# Patient Record
Sex: MALE | Race: WHITE | NOT HISPANIC OR LATINO | Employment: UNEMPLOYED | URBAN - NONMETROPOLITAN AREA
[De-identification: names, ages, dates, MRNs, and addresses within clinical notes are randomized per-mention and may not be internally consistent; named-entity substitution may affect disease eponyms.]

---

## 2018-03-13 ENCOUNTER — OFFICE VISIT CONVERTED (OUTPATIENT)
Dept: FAMILY MEDICINE CLINIC | Age: 58
End: 2018-03-13
Attending: FAMILY MEDICINE

## 2018-09-20 ENCOUNTER — OFFICE VISIT CONVERTED (OUTPATIENT)
Dept: FAMILY MEDICINE CLINIC | Age: 58
End: 2018-09-20
Attending: FAMILY MEDICINE

## 2018-09-26 ENCOUNTER — OFFICE VISIT CONVERTED (OUTPATIENT)
Dept: FAMILY MEDICINE CLINIC | Age: 58
End: 2018-09-26
Attending: FAMILY MEDICINE

## 2018-10-16 ENCOUNTER — OFFICE VISIT CONVERTED (OUTPATIENT)
Dept: CARDIOLOGY | Facility: CLINIC | Age: 58
End: 2018-10-16
Attending: INTERNAL MEDICINE

## 2018-10-16 ENCOUNTER — CONVERSION ENCOUNTER (OUTPATIENT)
Dept: OTHER | Facility: HOSPITAL | Age: 58
End: 2018-10-16

## 2018-11-12 ENCOUNTER — CONVERSION ENCOUNTER (OUTPATIENT)
Dept: CARDIOLOGY | Facility: CLINIC | Age: 58
End: 2018-11-12
Attending: INTERNAL MEDICINE

## 2018-12-06 ENCOUNTER — OFFICE VISIT CONVERTED (OUTPATIENT)
Dept: FAMILY MEDICINE CLINIC | Age: 58
End: 2018-12-06
Attending: NURSE PRACTITIONER

## 2019-02-05 ENCOUNTER — CONVERSION ENCOUNTER (OUTPATIENT)
Dept: CARDIOLOGY | Facility: CLINIC | Age: 59
End: 2019-02-05

## 2019-02-05 ENCOUNTER — OFFICE VISIT CONVERTED (OUTPATIENT)
Dept: CARDIOLOGY | Facility: CLINIC | Age: 59
End: 2019-02-05
Attending: INTERNAL MEDICINE

## 2019-04-25 ENCOUNTER — HOSPITAL ENCOUNTER (OUTPATIENT)
Dept: OTHER | Facility: HOSPITAL | Age: 59
Discharge: HOME | End: 2019-04-25
Attending: FAMILY MEDICINE

## 2019-04-25 ENCOUNTER — OFFICE VISIT CONVERTED (OUTPATIENT)
Dept: FAMILY MEDICINE CLINIC | Age: 59
End: 2019-04-25
Attending: FAMILY MEDICINE

## 2019-06-19 ENCOUNTER — OFFICE VISIT CONVERTED (OUTPATIENT)
Dept: OTOLARYNGOLOGY | Facility: CLINIC | Age: 59
End: 2019-06-19
Attending: OTOLARYNGOLOGY

## 2019-06-26 ENCOUNTER — HOSPITAL ENCOUNTER (OUTPATIENT)
Dept: PREADMISSION TESTING | Facility: HOSPITAL | Age: 59
Discharge: HOME | End: 2019-06-26
Attending: OTOLARYNGOLOGY

## 2019-06-26 LAB
ANION GAP SERPL CALC-SCNC: 15 MMOL/L (ref 8–19)
BUN SERPL-MCNC: 31 MG/DL (ref 5–25)
BUN/CREAT SERPL: 22 {RATIO} (ref 6–20)
CALCIUM SERPL-MCNC: 9.3 MG/DL (ref 8.7–10.4)
CHLORIDE SERPL-SCNC: 100 MMOL/L (ref 99–111)
CONV CO2: 28 MMOL/L (ref 22–32)
CREAT UR-MCNC: 1.38 MG/DL (ref 0.7–1.2)
GFR SERPLBLD BASED ON 1.73 SQ M-ARVRAT: 55 ML/MIN/{1.73_M2}
GLUCOSE SERPL-MCNC: 77 MG/DL (ref 70–99)
OSMOLALITY SERPL CALC.SUM OF ELEC: 291 MOSM/KG (ref 273–304)
POTASSIUM SERPL-SCNC: 5.1 MMOL/L (ref 3.5–5.3)
SODIUM SERPL-SCNC: 138 MMOL/L (ref 135–147)

## 2019-07-01 ENCOUNTER — HOSPITAL ENCOUNTER (OUTPATIENT)
Dept: PERIOP | Facility: HOSPITAL | Age: 59
Setting detail: HOSPITAL OUTPATIENT SURGERY
Discharge: HOME | End: 2019-07-01
Attending: OTOLARYNGOLOGY

## 2019-07-01 LAB
GLUCOSE BLD-MCNC: 131 MG/DL (ref 70–99)
GLUCOSE BLD-MCNC: 170 MG/DL (ref 70–99)

## 2019-07-03 LAB — BACTERIA SPEC ANAEROBE CULT: NORMAL

## 2019-07-05 LAB
AMOXICILLIN+CLAV SUSC ISLT: <=2
AMOXICILLIN+CLAV SUSC ISLT: >=32
AMPICILLIN SUSC ISLT: >=32
AMPICILLIN SUSC ISLT: >=32
AMPICILLIN+SULBAC SUSC ISLT: 16
AMPICILLIN+SULBAC SUSC ISLT: >=32
BACTERIA SPEC AEROBE CULT: ABNORMAL
CEFAZOLIN SUSC ISLT: >=64
CEFAZOLIN SUSC ISLT: >=64
CEFEPIME SUSC ISLT: <=1
CEFEPIME SUSC ISLT: <=1
CEFTAZIDIME SUSC ISLT: <=1
CEFTAZIDIME SUSC ISLT: <=1
CEFTRIAXONE SUSC ISLT: <=1
CEFTRIAXONE SUSC ISLT: <=1
CEFUROXIME ORAL SUSC ISLT: 2
CEFUROXIME ORAL SUSC ISLT: >=64
CEFUROXIME PARENTER SUSC ISLT: 2
CEFUROXIME PARENTER SUSC ISLT: >=64
CIPROFLOXACIN SUSC ISLT: <=0.25
CIPROFLOXACIN SUSC ISLT: <=0.25
ERTAPENEM SUSC ISLT: <=0.5
ERTAPENEM SUSC ISLT: <=0.5
GENTAMICIN SUSC ISLT: <=1
GENTAMICIN SUSC ISLT: <=1
LEVOFLOXACIN SUSC ISLT: <=0.12
LEVOFLOXACIN SUSC ISLT: <=0.12
TETRACYCLINE SUSC ISLT: <=1
TETRACYCLINE SUSC ISLT: >=16
TMP SMX SUSC ISLT: <=20
TMP SMX SUSC ISLT: <=20
TOBRAMYCIN SUSC ISLT: <=1
TOBRAMYCIN SUSC ISLT: <=1

## 2019-07-10 ENCOUNTER — OFFICE VISIT CONVERTED (OUTPATIENT)
Dept: OTOLARYNGOLOGY | Facility: CLINIC | Age: 59
End: 2019-07-10
Attending: OTOLARYNGOLOGY

## 2019-08-21 ENCOUNTER — OFFICE VISIT CONVERTED (OUTPATIENT)
Dept: OTOLARYNGOLOGY | Facility: CLINIC | Age: 59
End: 2019-08-21
Attending: OTOLARYNGOLOGY

## 2020-02-25 ENCOUNTER — OFFICE VISIT CONVERTED (OUTPATIENT)
Dept: FAMILY MEDICINE CLINIC | Age: 60
End: 2020-02-25
Attending: FAMILY MEDICINE

## 2020-04-29 ENCOUNTER — HOSPITAL ENCOUNTER (OUTPATIENT)
Dept: OTHER | Facility: HOSPITAL | Age: 60
Discharge: HOME | End: 2020-04-29
Attending: INTERNAL MEDICINE

## 2020-04-29 LAB
ALBUMIN SERPL-MCNC: 4.4 G/DL (ref 3.5–5)
ALBUMIN/GLOB SERPL: 1.3 {RATIO} (ref 1.4–2.6)
ALP SERPL-CCNC: 110 U/L (ref 56–119)
ALT SERPL-CCNC: 18 U/L (ref 10–40)
ANION GAP SERPL CALC-SCNC: 24 MMOL/L (ref 8–19)
AST SERPL-CCNC: 21 U/L (ref 15–50)
BILIRUB SERPL-MCNC: 0.2 MG/DL (ref 0.2–1.3)
BUN SERPL-MCNC: 29 MG/DL (ref 5–25)
BUN/CREAT SERPL: 22 {RATIO} (ref 6–20)
CALCIUM SERPL-MCNC: 9.6 MG/DL (ref 8.7–10.4)
CHLORIDE SERPL-SCNC: 106 MMOL/L (ref 99–111)
CONV CO2: 22 MMOL/L (ref 22–32)
CONV TOTAL PROTEIN: 7.9 G/DL (ref 6.3–8.2)
CREAT UR-MCNC: 1.34 MG/DL (ref 0.7–1.2)
EST. AVERAGE GLUCOSE BLD GHB EST-MCNC: 151 MG/DL
GFR SERPLBLD BASED ON 1.73 SQ M-ARVRAT: 57 ML/MIN/{1.73_M2}
GLOBULIN UR ELPH-MCNC: 3.5 G/DL (ref 2–3.5)
GLUCOSE SERPL-MCNC: 133 MG/DL (ref 70–99)
HBA1C MFR BLD: 6.9 % (ref 3.5–5.7)
OSMOLALITY SERPL CALC.SUM OF ELEC: 312 MOSM/KG (ref 273–304)
POTASSIUM SERPL-SCNC: 4.8 MMOL/L (ref 3.5–5.3)
SODIUM SERPL-SCNC: 147 MMOL/L (ref 135–147)
T4 FREE SERPL-MCNC: 1 NG/DL (ref 0.9–1.8)
TSH SERPL-ACNC: 4.59 M[IU]/L (ref 0.27–4.2)

## 2020-04-30 LAB — PSA SERPL-MCNC: 1.05 NG/ML (ref 0–4)

## 2020-06-09 ENCOUNTER — CONVERSION ENCOUNTER (OUTPATIENT)
Dept: GASTROENTEROLOGY | Facility: CLINIC | Age: 60
End: 2020-06-09
Attending: INTERNAL MEDICINE

## 2020-07-27 ENCOUNTER — CONVERSION ENCOUNTER (OUTPATIENT)
Dept: FAMILY MEDICINE CLINIC | Age: 60
End: 2020-07-27

## 2020-07-27 ENCOUNTER — HOSPITAL ENCOUNTER (OUTPATIENT)
Dept: OTHER | Facility: HOSPITAL | Age: 60
Discharge: HOME | End: 2020-07-27
Attending: INTERNAL MEDICINE

## 2020-07-29 LAB — SARS-COV-2 RNA SPEC QL NAA+PROBE: NOT DETECTED

## 2020-07-30 ENCOUNTER — HOSPITAL ENCOUNTER (OUTPATIENT)
Dept: GASTROENTEROLOGY | Facility: HOSPITAL | Age: 60
Setting detail: HOSPITAL OUTPATIENT SURGERY
Discharge: HOME | End: 2020-07-30
Attending: INTERNAL MEDICINE

## 2020-07-30 LAB — GLUCOSE BLD-MCNC: 149 MG/DL (ref 70–99)

## 2020-08-26 ENCOUNTER — OFFICE VISIT CONVERTED (OUTPATIENT)
Dept: OTOLARYNGOLOGY | Facility: CLINIC | Age: 60
End: 2020-08-26
Attending: OTOLARYNGOLOGY

## 2020-10-15 ENCOUNTER — OFFICE VISIT CONVERTED (OUTPATIENT)
Dept: FAMILY MEDICINE CLINIC | Age: 60
End: 2020-10-15
Attending: FAMILY MEDICINE

## 2020-10-29 ENCOUNTER — HOSPITAL ENCOUNTER (OUTPATIENT)
Dept: OTHER | Facility: HOSPITAL | Age: 60
Discharge: HOME | End: 2020-10-29
Attending: INTERNAL MEDICINE

## 2020-10-29 LAB
ALBUMIN SERPL-MCNC: 4.5 G/DL (ref 3.5–5)
ALBUMIN/GLOB SERPL: 1.2 {RATIO} (ref 1.4–2.6)
ALP SERPL-CCNC: 98 U/L (ref 56–155)
ALT SERPL-CCNC: 29 U/L (ref 10–40)
ANION GAP SERPL CALC-SCNC: 15 MMOL/L (ref 8–19)
AST SERPL-CCNC: 31 U/L (ref 15–50)
BILIRUB SERPL-MCNC: 0.24 MG/DL (ref 0.2–1.3)
BUN SERPL-MCNC: 29 MG/DL (ref 5–25)
BUN/CREAT SERPL: 19 {RATIO} (ref 6–20)
CALCIUM SERPL-MCNC: 9.4 MG/DL (ref 8.7–10.4)
CHLORIDE SERPL-SCNC: 105 MMOL/L (ref 99–111)
CHOLEST SERPL-MCNC: 138 MG/DL (ref 107–200)
CHOLEST/HDLC SERPL: 4.3 {RATIO} (ref 3–6)
CK SERPL-CCNC: 307 U/L (ref 57–374)
CONV CO2: 27 MMOL/L (ref 22–32)
CONV TOTAL PROTEIN: 8.2 G/DL (ref 6.3–8.2)
CREAT UR-MCNC: 1.55 MG/DL (ref 0.7–1.2)
EST. AVERAGE GLUCOSE BLD GHB EST-MCNC: 131 MG/DL
GFR SERPLBLD BASED ON 1.73 SQ M-ARVRAT: 47 ML/MIN/{1.73_M2}
GLOBULIN UR ELPH-MCNC: 3.7 G/DL (ref 2–3.5)
GLUCOSE SERPL-MCNC: 37 MG/DL (ref 70–99)
HBA1C MFR BLD: 6.2 % (ref 3.5–5.7)
HDLC SERPL-MCNC: 32 MG/DL (ref 40–60)
LDLC SERPL CALC-MCNC: 90 MG/DL (ref 70–100)
OSMOLALITY SERPL CALC.SUM OF ELEC: 296 MOSM/KG (ref 273–304)
POTASSIUM SERPL-SCNC: 4.7 MMOL/L (ref 3.5–5.3)
SODIUM SERPL-SCNC: 142 MMOL/L (ref 135–147)
TRIGL SERPL-MCNC: 79 MG/DL (ref 40–150)
VLDLC SERPL-MCNC: 16 MG/DL (ref 5–37)

## 2020-12-04 ENCOUNTER — HOSPITAL ENCOUNTER (OUTPATIENT)
Dept: OTHER | Facility: HOSPITAL | Age: 60
Discharge: HOME | End: 2020-12-04
Attending: FAMILY MEDICINE

## 2020-12-04 LAB
ANION GAP SERPL CALC-SCNC: 20 MMOL/L (ref 8–19)
APPEARANCE UR: CLEAR
BACTERIA UR QL AUTO: ABNORMAL
BILIRUB UR QL: NEGATIVE
BUN SERPL-MCNC: 31 MG/DL (ref 5–25)
BUN/CREAT SERPL: 19 {RATIO} (ref 6–20)
CALCIUM SERPL-MCNC: 9.4 MG/DL (ref 8.7–10.4)
CASTS URNS QL MICRO: ABNORMAL /[LPF]
CHLORIDE SERPL-SCNC: 102 MMOL/L (ref 99–111)
COLOR UR: YELLOW
CONV CO2: 23 MMOL/L (ref 22–32)
CONV LEUKOCYTE ESTERASE: NEGATIVE
CONV UROBILINOGEN IN URINE BY AUTOMATED TEST STRIP: 0.2 {EHRLICHU}/DL (ref 0.1–1)
CREAT UR-MCNC: 1.59 MG/DL (ref 0.7–1.2)
EPI CELLS #/AREA URNS HPF: ABNORMAL /[HPF]
GFR SERPLBLD BASED ON 1.73 SQ M-ARVRAT: 46 ML/MIN/{1.73_M2}
GLUCOSE 24H UR-MCNC: NEGATIVE MG/DL
GLUCOSE SERPL-MCNC: 199 MG/DL (ref 70–99)
HGB UR QL STRIP: NEGATIVE
KETONES UR QL STRIP: NEGATIVE MG/DL
MUCOUS THREADS URNS QL MICRO: ABNORMAL
NITRITE UR-MCNC: NEGATIVE MG/ML
OSMOLALITY SERPL CALC.SUM OF ELEC: 302 MOSM/KG (ref 273–304)
PH UR STRIP.AUTO: 5.5 [PH] (ref 5–8)
POTASSIUM SERPL-SCNC: 5.3 MMOL/L (ref 3.5–5.3)
PROT UR-MCNC: NEGATIVE MG/DL
RBC # BLD AUTO: ABNORMAL /[HPF]
SODIUM SERPL-SCNC: 140 MMOL/L (ref 135–147)
SP GR UR STRIP: >=1.03 (ref 1–1.03)
SPECIMEN SOURCE: ABNORMAL
UNIDENT CRYS URNS QL MICRO: ABNORMAL /[HPF]
WBC #/AREA URNS HPF: ABNORMAL /[HPF]

## 2020-12-18 ENCOUNTER — HOSPITAL ENCOUNTER (OUTPATIENT)
Dept: OTHER | Facility: HOSPITAL | Age: 60
Discharge: HOME | End: 2020-12-18
Attending: FAMILY MEDICINE

## 2020-12-21 ENCOUNTER — CONVERSION ENCOUNTER (OUTPATIENT)
Dept: GASTROENTEROLOGY | Facility: CLINIC | Age: 60
End: 2020-12-21
Attending: INTERNAL MEDICINE

## 2021-02-01 ENCOUNTER — HOSPITAL ENCOUNTER (OUTPATIENT)
Dept: GASTROENTEROLOGY | Facility: HOSPITAL | Age: 61
Setting detail: HOSPITAL OUTPATIENT SURGERY
Discharge: HOME | End: 2021-02-01
Attending: INTERNAL MEDICINE

## 2021-02-01 LAB — GLUCOSE BLD-MCNC: 210 MG/DL (ref 70–99)

## 2021-03-08 ENCOUNTER — HOSPITAL ENCOUNTER (OUTPATIENT)
Dept: OTHER | Facility: HOSPITAL | Age: 61
Discharge: HOME | End: 2021-03-08
Attending: INTERNAL MEDICINE

## 2021-03-08 LAB
ALBUMIN SERPL-MCNC: 4.2 G/DL (ref 3.5–5)
ALBUMIN/GLOB SERPL: 1.3 {RATIO} (ref 1.4–2.6)
ALP SERPL-CCNC: 101 U/L (ref 56–155)
ALT SERPL-CCNC: 18 U/L (ref 10–40)
ANION GAP SERPL CALC-SCNC: 13 MMOL/L (ref 8–19)
AST SERPL-CCNC: 19 U/L (ref 15–50)
BILIRUB SERPL-MCNC: 0.19 MG/DL (ref 0.2–1.3)
BUN SERPL-MCNC: 26 MG/DL (ref 5–25)
BUN/CREAT SERPL: 21 {RATIO} (ref 6–20)
CALCIUM SERPL-MCNC: 9.1 MG/DL (ref 8.7–10.4)
CHLORIDE SERPL-SCNC: 105 MMOL/L (ref 99–111)
CHOLEST SERPL-MCNC: 116 MG/DL (ref 107–200)
CHOLEST/HDLC SERPL: 4.1 {RATIO} (ref 3–6)
CONV CO2: 28 MMOL/L (ref 22–32)
CONV CREATININE URINE, RANDOM: 246.1 MG/DL (ref 10–300)
CONV MICROALBUM.,U,RANDOM: 43.5 MG/L (ref 0–20)
CONV TOTAL PROTEIN: 7.4 G/DL (ref 6.3–8.2)
CREAT UR-MCNC: 1.25 MG/DL (ref 0.7–1.2)
EST. AVERAGE GLUCOSE BLD GHB EST-MCNC: 151 MG/DL
GFR SERPLBLD BASED ON 1.73 SQ M-ARVRAT: >60 ML/MIN/{1.73_M2}
GLOBULIN UR ELPH-MCNC: 3.2 G/DL (ref 2–3.5)
GLUCOSE SERPL-MCNC: 162 MG/DL (ref 70–99)
HBA1C MFR BLD: 6.9 % (ref 3.5–5.7)
HDLC SERPL-MCNC: 28 MG/DL (ref 40–60)
LDLC SERPL CALC-MCNC: 73 MG/DL (ref 70–100)
MICROALBUMIN/CREAT UR: 17.7 MG/G{CRE} (ref 0–25)
OSMOLALITY SERPL CALC.SUM OF ELEC: 298 MOSM/KG (ref 273–304)
POTASSIUM SERPL-SCNC: 5.8 MMOL/L (ref 3.5–5.3)
SODIUM SERPL-SCNC: 140 MMOL/L (ref 135–147)
TRIGL SERPL-MCNC: 77 MG/DL (ref 40–150)
TSH SERPL-ACNC: 3.88 M[IU]/L (ref 0.27–4.2)
VLDLC SERPL-MCNC: 15 MG/DL (ref 5–37)

## 2021-03-11 ENCOUNTER — HOSPITAL ENCOUNTER (OUTPATIENT)
Dept: OTHER | Facility: HOSPITAL | Age: 61
Discharge: HOME | End: 2021-03-11
Attending: INTERNAL MEDICINE

## 2021-03-11 LAB
ANION GAP SERPL CALC-SCNC: 14 MMOL/L (ref 8–19)
BUN SERPL-MCNC: 25 MG/DL (ref 5–25)
BUN/CREAT SERPL: 20 {RATIO} (ref 6–20)
CALCIUM SERPL-MCNC: 9 MG/DL (ref 8.7–10.4)
CHLORIDE SERPL-SCNC: 104 MMOL/L (ref 99–111)
CONV CO2: 26 MMOL/L (ref 22–32)
CREAT UR-MCNC: 1.22 MG/DL (ref 0.7–1.2)
GFR SERPLBLD BASED ON 1.73 SQ M-ARVRAT: >60 ML/MIN/{1.73_M2}
GLUCOSE SERPL-MCNC: 112 MG/DL (ref 70–99)
OSMOLALITY SERPL CALC.SUM OF ELEC: 293 MOSM/KG (ref 273–304)
POTASSIUM SERPL-SCNC: 4.5 MMOL/L (ref 3.5–5.3)
SODIUM SERPL-SCNC: 139 MMOL/L (ref 135–147)

## 2021-05-10 NOTE — H&P
History and Physical      Patient Name: Nicholas Givens   Patient ID: [id number]   Sex: Male   YOB: 1960    Primary Care Provider: Buck Aparicio MD   Referring Provider: Buck Aparicio MD    Visit Date: 2020    Provider: Davon Schaefer MD   [place] [facility] [place] [address] [place] [address]  [id number]   Location Phone: [phone]          Chief Complaint  · Surgical History and Physical  · Screening Colonoscopy      History Of Present Illness  NON-INPATIENT HISTORY AND PHYSICAL  Allergies: NO KNOWN DRUG ALLERGIES   Chief Complaint/History of Present Illness: Colon screening history of colon polyps, No family history of colon cancer   Colon Recall: Yes How Lon months   Failed Outpatient Treatment/Contraindications: N/A   Current Medications: amlodipine 5 mg oral tablet, aspirin 325 mg oral tablet, glimepiride 4 mg oral tablet, hydrochlorothiazide 12.5 mg oral tablet, irbesartan 150 mg oral tablet, Levemir FlexTouch U-100 Insuln 100 unit/mL (3 mL) subcutaneous insulin pen, levothyroxine 50 mcg oral tablet, Lumigan 0.01 % ophthalmic (eye) drops, metformin 1,000 mg oral tablet, metformin 500 mg oral tablet, Novolog Flexpen U-100 Insulin 100 unit/mL subcutaneous insulin pen, NuLYTELY with Flavor Packs 420 gram oral recon soln, simvastatin 40 mg oral tablet, and Victoza 3-Huebrt 0.6 mg/0.1 mL (18 mg/3 mL) subcutaneous pen injector   Significant Past Medical History: Chronic Sinusitis, Diabetes, HTN (hypertension), Hyperlipemia, Hypothyroidism, Knee pain, Laryngeal cancer, Nasal obstruction, Nasal polyposis, Rhinorrhea, Tobacco abuse, and Tracheostomy present   Significant Family Medical History: No family history of colon cancer   Significant Past Surgical History: cardiac stents, Colonoscopy, Knee surgery, Nasal polyp surgery, Sinus Surgery, and Tracheoesophageal Puncture   Previous Colonoscopy: Yes YEAR:  By Whom: Davon Schaefer MD    Previous EGD: No   PHYSICAL EXAM:  Heart: Regular Rate and Rhythm   Lungs: Breathing Unlabored           Assessment  · Preoperative examination     V72.84/Z01.818  · Screening for colon cancer     V76.51/Z12.11  · History of colon polyps     V12.72/Z86.010      Plan  · Orders  o Consent for Colonoscopy Screening -Possible risk/complications, benefits, and alternatives to surgical or invasive procedure have been explained to patient and/or legal gaurdian. -Patient has been evaluated and can tolerate anethesia and/or sedation. Risk, benefits, and alternatives to anesthesia and sedation have been explained to patient or legal gaurdian. () - V72.84/Z01.818, V76.51/Z12.11, V12.72/Z86.010 - 02/01/2021  · Medications  o Medications have been Reconciled  o Transition of Care or Provider Policy  · Instructions  o ****Surgical Orders****  o ***************  o Outpatient  o ***************  o RISK AND BENEFITS:  o Possible risks/complications, benefits and alternatives to surgical or invasive procedure have been explained to the patient and/or legal guardian.  o Patient has been evaluated and can tolerate anesthesia and/or sedation. Risks, benefits, and alternatives to anesthesia and sedation have been explained to the patient and/or legal guardian.  o ***************  o PREP: Per protocol  o IV: Per Anesthesia  o The above History and Physical Examination has been completed within 30 days of admission.  o This note has been transcribed by MICHAEL Gibson. I have read and agree with the findings in this note.  o Electronically Identified Patient Education Materials Provided Electronically  · Disposition  o Call or Return if symptoms worsen or persist.            Electronically Signed by: Janet Hillman MA -Author on December 21, 2020 10:28:09 AM

## 2021-05-10 NOTE — H&P
History and Physical      Patient Name: Nicholas Givens   Patient ID: [id number]   Sex: Male   YOB: 1960    Primary Care Provider: Buck Aparicio MD   Referring Provider: Buck Aparicio MD    Visit Date: June 9, 2020    Provider: Davon Schaefer MD   Location: [facility] Address: [address] [place] [address]  [id number]   Location Phone: [phone]          Chief Complaint  · Surgical History and Physical      History Of Present Illness  NON-INPATIENT HISTORY AND PHYSICAL  Allergies: NO KNOWN DRUG ALLERGIES   Chief Complaint/History of Present Illness: Colonoscopy for Positive Cologuard   Colon Recall: No   Failed Outpatient Treatment/Contraindications: N/A   Current Medications: aspirin 325 mg oral tablet, fluticasone propionate 50 mcg/actuation nasal spray,suspension, glimepiride 4 mg oral tablet, hydrochlorothiazide 12.5 mg oral tablet, irbesartan 150 mg oral tablet, Levemir FlexTouch U-100 Insuln 100 unit/mL (3 mL) subcutaneous insulin pen, levothyroxine 50 mcg oral tablet, Lumigan 0.01 % ophthalmic (eye) drops, metformin 1,000 mg oral tablet, Novolog Flexpen U-100 Insulin 100 unit/mL subcutaneous insulin pen, NuLYTELY with Flavor Packs 420 gram oral recon soln, simvastatin 80 mg oral tablet, and Victoza 3-Hubert 0.6 mg/0.1 mL (18 mg/3 mL) subcutaneous pen injector   Significant Past Medical History: Chronic Sinusitis, Diabetes, HTN (hypertension), Hyperlipemia, Hypothyroidism, Knee pain, Laryngeal cancer, Nasal obstruction, Nasal polyposis, Rhinorrhea, Tobacco abuse, and Tracheostomy present   Significant Family Medical History: No Family History of Colon Cancer   Significant Past Surgical History: cardiac stents, Colonoscopy, Knee surgery, Nasal polyp surgery, Sinus Surgery, and Tracheoesophageal Puncture   Previous Colonoscopy: Yes YEAR: 2009 By Whom: Chip Cabrera MD   Previous EGD: Unknown   PHYSICAL EXAM:  Heart: Regular Rate and Rhythm   Lungs: Breathing Unlabored            Assessment  · Preoperative examination     V72.84/Z01.818  · Positive colorectal cancer screening using DNA-based stool test     787.7/R19.5      Plan  · Orders  o Consent for Colonoscopy with Possible Biopsy - Possible risks/complications, benefits, and alternatives to surgical or invasive procedure have been explained to patient and/or legal guardian. -Patient has been evaluated and can tolerate anesthesia and/or sedation. Risks, benefits, and alternatives to anesthesia and sedation have been explained to patient and/or legal guardian. ([id number]) - V72.84/Z01.818, 787.7/R19.5 - 07/30/2020  · Medications  o Medications have been Reconciled  o Transition of Care or Provider Policy  · Instructions  o ****Surgical Orders****  o ***************  o Outpatient  o ***************  o RISK AND BENEFITS:  o Possible risks/complications, benefits and alternatives to surgical or invasive procedure have been explained to the patient and/or legal guardian.  o Patient has been evaluated and can tolerate anesthesia and/or sedation. Risks, benefits, and alternatives to anesthesia and sedation have been explained to the patient and/or legal guardian.  o ***************  o PREP: Per protocol  o IV: Per Anesthesia  o The above History and Physical Examination has been completed within 30 days of admission.  o This note has been transcribed by PABLO Rosales MA. I have read and agree with the findings in this note.  o Electronically Identified Patient Education Materials Provided Electronically  · Disposition  o Call or Return if symptoms worsen or persist.            Electronically Signed by: Blaine Rosales, -Author on June 9, 2020 09:15:16 AM

## 2021-05-13 NOTE — PROGRESS NOTES
Progress Note      Patient Name: Nicholas Givens   Patient ID: [id number]   Sex: Male   YOB: 1960    Primary Care Provider: Buck Aparicio MD   Referring Provider: Buck Aparicio MD    Visit Date: August 26, 2020    Provider: Shaun Nur MD   Location: [facility]   Location Address: [address] [place] [address] [facility]  [address] [place] [address]  [id number]   Location Phone: [phone]          Chief Complaint     1.  Chronic sinusitis    2.  Nasal polyposis       History Of Present Illness  Nicholas Givens is a 60 year old /White male who presents to the office today for a follow-up visit.      He presents the clinic today for follow-up regarding chronic sinusitis and nasal polyposis.  I last saw him for this a year ago.  He states that he has been doing well.  He is mostly breathing through his trach, but has had no significant issues with nasal drainage or purulence.  He denies any facial pain and pressure.  He does do nasal saline irrigations several times every week.  He has stopped using his fluticasone.  He underwent endoscopic sinus surgery and nasal polypectomy about 14 months ago, and has had no issues.       Past Medical History  Chronic Sinusitis; Diabetes; HTN (hypertension); Hyperlipemia; Hypothyroidism; Knee pain; Laryngeal cancer; Nasal obstruction; Nasal polyposis; Rhinorrhea; Tobacco abuse; Tracheostomy present         Past Surgical History  cardiac stents; Colonoscopy; Knee surgery; Nasal polyp surgery; Sinus Surgery; Tracheoesophageal Puncture         Medication List  aspirin 325 mg oral tablet; fluticasone propionate 50 mcg/actuation nasal spray,suspension; glimepiride 4 mg oral tablet; hydrochlorothiazide 12.5 mg oral tablet; irbesartan 150 mg oral tablet; Levemir FlexTouch U-100 Insuln 100 unit/mL (3 mL) subcutaneous insulin pen; levothyroxine 50 mcg oral tablet; Lumigan 0.01 % ophthalmic (eye) drops; metformin 1,000 mg oral tablet; metformin  500 mg oral tablet; Novolog Flexpen U-100 Insulin 100 unit/mL subcutaneous insulin pen; simvastatin 40 mg oral tablet; Victoza 3-Hubert 0.6 mg/0.1 mL (18 mg/3 mL) subcutaneous pen injector         Allergy List  NO KNOWN DRUG ALLERGIES         Family Medical History  Asthma; Coronary Artery Disease; Heart Disease; Hypertension; - No Family History of Colorectal Cancer; Diabetes         Social History  Tobacco (Former)         Review of Systems  · Constitutional  o Denies  o : fever, night sweats, weight loss  · Eyes  o Denies  o : discharge from eye, impaired vision  · HENT  o Admits  o : *See HPI  · Cardiovascular  o Denies  o : chest pain, irregular heart beats  · Respiratory  o Denies  o : shortness of breath, wheezing, coughing up blood  · Gastrointestinal  o Denies  o : heartburn, reflux, vomiting blood  · Genitourinary  o Denies  o : frequency of urine  · Integument  o Denies  o : rash, skin dryness  · Neurologic  o Denies  o : seizures, loss of balance, loss of consciousness, dizziness  · Endocrine  o Denies  o : cold intolerance, heat intolerance  · Heme-Lymph  o Denies  o : easy bleeding, anemia      Vitals  Date Time BP Position Site L\R Cuff Size HR RR TEMP (F) WT  HT  BMI kg/m2 BSA m2 O2 Sat        08/26/2020 11:50 AM       16 98 203lbs 16oz 6'   27.67 2.17           Physical Examination  · Constitutional  o Appearance  o : well developed, well-nourished, alert and in no acute distress, voice clear and strong  · Head and Face  o Head  o :   § Inspection  § : no deformities or lesions  o Face  o :   § Inspection  § : No facial lesions; House-Brackmann I/VI bilaterally  § Palpation  § : No TMJ crepitus nor  muscle tenderness bilaterally  · Eyes  o Vision  o :   § Visual Fields  § : Extraocular movements are intact. No spontaneous or gaze-induced nystagmus.  o Conjunctivae  o : clear  o Sclerae  o : clear  o Pupils and Irises  o : pupils equal, round, and reactive to light.   · Ears, Nose, Mouth and  Throat  o Ears  o :   § External Ears  § : appearance within normal limits, no lesions present  § Otoscopic Examination  § : tympanic membrane appearance within normal limits bilaterally without perforations, well-aerated middle ears  § Hearing  § : intact to conversational voice both ears  o Nose  o :   § External Nose  § : appearance normal  § Intranasal Exam  § : mucosa within normal limits, vestibules normal, no intranasal lesions present, septum midline, sinuses non tender to percussion  o Oral Cavity  o :   § Oral Mucosa  § : oral mucosa normal without pallor or cyanosis  § Lips  § : lip appearance normal  § Teeth  § : normal dentition for age  § Gums  § : gums pink, non-swollen, no bleeding present  § Tongue  § : tongue appearance normal; normal mobility  § Palate  § : hard palate normal, soft palate appearance normal with symmetric mobility  o Throat  o :   § Oropharynx  § : no inflammation or lesions present, tonsils within normal limits  § Hypopharynx  § : appearance within normal limits, superior epiglottis within normal limits  § Larynx  § : appearance within normal limits, vocal cords within normal limits, no lesions present  o Nasopharyngoscopy  o : The patients nares were anesthetized with Lidocaine with Afrin. After this was done, the flexible nasopharyngoscope was passed through both the left and right sides. The nasal cavity shows no evidence of purulence. Stable changes following endoscopic sinus surgery with 1 polyp just medial to the middle turbinate on the left side. The right side is polyp free. There is no evidence of obstruction from this.  · Neck  o Inspection/Palpation  o : normal appearance, no masses or tenderness, trachea midline; thyroid size normal, nontender, no nodules or masses present on palpation. Trach site normal with Shiley trach in good position  · Respiratory  o Respiratory Effort  o : breathing unlabored  o Inspection of Chest  o : normal appearance, no  retractions  · Cardiovascular  o Heart  o : regular rate and rhythm  · Lymphatic  o Neck  o : no lymphadenopathy present  o Supraclavicular Nodes  o : no lymphadenopathy present  o Preauricular Nodes  o : no lymphadenopathy present  · Skin and Subcutaneous Tissue  o General Inspection  o : Regarding face and neck - there are no rashes present, no lesions present, and no areas of discoloration  · Neurologic  o Cranial Nerves  o : cranial nerves II-XII are grossly intact bilaterally  o Gait and Station  o : normal gait, able to stand without diffculty  · Psychiatric  o Judgement and Insight  o : judgment and insight intact  o Mood and Affect  o : mood normal, affect appropriate          Assessment  · Sinusitis     473.9/J32.9  · Nasal polyps     471.9/J33.9    Problems Reconciled  Plan  · Orders  o Nasal endoscopy [facility] ([id number]) - 471.9/J33.9, 473.9/J32.9 - 08/26/2020  · Medications  o Medications have been Reconciled  o Transition of Care or Provider Policy  · Instructions  o He presents the clinic today for follow-up regarding chronic sinusitis and nasal polyposis. I last saw him for this a year ago. He states that he has been doing well. He is mostly breathing through his trach, but has had no significant issues with nasal drainage or purulence. He denies any facial pain and pressure. He does do nasal saline irrigations several times every week. He has stopped using his fluticasone. He underwent endoscopic sinus surgery and nasal polypectomy about 14 months ago, and has had no issues. On examination today did perform a nasal endoscopy which shows stable postoperative changes bilaterally with one polyp which is nonobstructing on the left side. I will have him continue with a nasal regimen, and will have him contact me should there be any further issues.  o Electronically Identified Patient Education Materials Provided Electronically  · Correspondence  o ENT Letter to Referring MD (Buck Aparicio MD) -  08/26/2020            Electronically Signed by: Shaun Nur MD -Author on August 26, 2020 12:38:18 PM

## 2021-05-14 VITALS — TEMPERATURE: 98 F | HEIGHT: 72 IN | WEIGHT: 204 LBS | RESPIRATION RATE: 16 BRPM | BODY MASS INDEX: 27.63 KG/M2

## 2021-05-15 VITALS
DIASTOLIC BLOOD PRESSURE: 76 MMHG | RESPIRATION RATE: 20 BRPM | HEIGHT: 72 IN | SYSTOLIC BLOOD PRESSURE: 152 MMHG | WEIGHT: 217.25 LBS | BODY MASS INDEX: 29.42 KG/M2 | OXYGEN SATURATION: 95 % | HEART RATE: 88 BPM | TEMPERATURE: 98.2 F

## 2021-05-15 VITALS
HEIGHT: 72 IN | HEART RATE: 86 BPM | OXYGEN SATURATION: 94 % | TEMPERATURE: 97.8 F | WEIGHT: 211.37 LBS | SYSTOLIC BLOOD PRESSURE: 141 MMHG | BODY MASS INDEX: 28.63 KG/M2 | DIASTOLIC BLOOD PRESSURE: 78 MMHG | RESPIRATION RATE: 20 BRPM

## 2021-05-15 VITALS
TEMPERATURE: 98.2 F | SYSTOLIC BLOOD PRESSURE: 139 MMHG | HEART RATE: 90 BPM | RESPIRATION RATE: 16 BRPM | HEIGHT: 72 IN | DIASTOLIC BLOOD PRESSURE: 74 MMHG | BODY MASS INDEX: 29.12 KG/M2 | WEIGHT: 215 LBS | OXYGEN SATURATION: 92 %

## 2021-05-16 VITALS
HEART RATE: 86 BPM | WEIGHT: 212 LBS | DIASTOLIC BLOOD PRESSURE: 73 MMHG | HEIGHT: 72 IN | SYSTOLIC BLOOD PRESSURE: 124 MMHG | BODY MASS INDEX: 28.71 KG/M2

## 2021-05-16 VITALS
SYSTOLIC BLOOD PRESSURE: 139 MMHG | DIASTOLIC BLOOD PRESSURE: 69 MMHG | HEART RATE: 97 BPM | BODY MASS INDEX: 29.53 KG/M2 | HEIGHT: 72 IN | WEIGHT: 218 LBS

## 2021-05-18 NOTE — PROGRESS NOTES
Nicholas Givens 1960     Office/Outpatient Visit    Visit Date: Tue, Mar 13, 2018 03:51 pm    Provider: Buck Aparicio MD (Assistant: Adriana Carmen MA)    Location: [facility]        Electronically signed by Buck Aparicio MD on  03/14/2018 09:18:10 AM                             SUBJECTIVE:        CC: blood pressure, cholesterol, diabetes         HPI:         Patient to be evaluated for type 2 diabetes.  Current meds include an oral hypoglycemic ( Amaryl and Glucophage XR ) and insulin/injectable ( Levemir - 40 units; Victoza ).  He reports home blood glucose readings have been excellent, with average fasting glucoses running <120 mg/dL.  Most recent lab results include glycohemoglobin 8.1%%.  In regard to preventative care, he performs foot self-exams several days per week and his last ophthalmology exam was in the last few weeks.          With regard to the hypertension, his current cardiac medication regimen includes an ACE inhibitor ( Prinivil ).  He is tolerating the medication well without side effects.  Compliance with treatment has been good; he takes his medication as directed and follows up as directed.          Dx with hyperlipidemia; current treatment includes Zocor and diet.  Compliance with treatment has been fair.  He denies experiencing any hypercholesterolemia related symptoms.      ROS:     CONSTITUTIONAL:  Negative for chills and fever.      CARDIOVASCULAR:  Negative for chest pain and palpitations.      RESPIRATORY:  Negative for recent cough and dyspnea.      GASTROINTESTINAL:  Negative for abdominal pain, nausea and vomiting.      INTEGUMENTARY:  Negative for atypical mole(s) and rash.          PMH/FMH/SH:     Last Reviewed on 3/13/2018 04:02 PM by Buck Aparicio    Past Medical History:             PAST MEDICAL HISTORY         Laryngeal Cancer         PREVENTIVE HEALTH MAINTENANCE             COLORECTAL CANCER SCREENING: Up to date (colonoscopy q10y;  sigmoidoscopy q5y; Cologuard q3y) was last done 11/2009, Results are in chart; colonoscopy with normal results; hemorrhoids         Family History:         Positive for Coronary Artery Disease ( brother ), Hypertension ( father; mother ) and Myocardial Infarction ( father ).      Positive for Asthma ( sister ).      Positive for Type 2 Diabetes ( mother ).          Social History:     Occupation: a construction     Marital Status: Single         Tobacco/Alcohol/Supplements:     Last Reviewed on 3/13/2018 04:02 PM by Buck Aparicio    Tobacco: He has a past history of cigarette smoking; quit date:  10/06.      Caffeine:  He admits to consuming caffeine via tea ( 2 servings per day ).          Substance Abuse History:     Last Reviewed on 3/13/2018 04:02 PM by Buck Aparicio        Marijuana: Current use.          Mental Health History:     Last Reviewed on 3/13/2018 04:02 PM by Buck Aparicio        Communicable Diseases (eg STDs):     Last Reviewed on 3/13/2018 04:02 PM by Buck Aparicio            Current Problems:     Last Reviewed on 3/13/2018 04:02 PM by Buck Aparicio    CAD     Hypertension     Hyperlipidemia     Type 2 diabetes     Laryngeal cancer, NOS     Screening for cancer of colon     History of pulmonary embolism     Tracheostomy status     Peripheral neuropathy attributed to type II diabetes     Screening for prostate cancer         Immunizations:     zzPrevnar-13 8/12/2015     zzFluzone pf-quadrivalent 3 and up 10/27/2015     zzFluzone pf-quadrivalent 3 and up 10/21/2016     zzFluzone pf-quadrivalent 3 and up 10/23/2017     Fluzone (3 + years dose) 12/16/2008     Fluzone (3 + years dose) 12/7/2010     Fluzone pf (3+ years dose) 10/25/2013     Fluzone pf (3+ years dose) 10/14/2014     Influenza A (H1N1), IM (3+ years) Monovalent 12/2/2009     Pneomovax 12/7/2010         Allergies:     Last Reviewed on 3/13/2018 04:02 PM by Buck Aparicio      No Known  Drug Allergies.         Current Medications:     Last Reviewed on 3/13/2018 04:02 PM by Buck Aparicio FlexTouch 100units/1ml Injection Inject 40 units qhs E11.9     Lisinopril 10mg Tablet Take 1 tablet(s) by mouth daily     Simvastatin 80mg Tablet Take 1 table by mouth at bedtime     Lancet   Lancet Check blood once daily as directed for ONE TOUCH ULTRA  DIAG E11.9     Aspirin (ASA) 325mg Caplet One a day     Glimepiride 4mg Tablet One po bid     Levothyroxine Sodium 50mcg Capsules 1 p.o. daily     Metformin HCl 1,000mg Tablets, Extended Release Take one PO BID     Victoza 3-Hubert 18mg/3ml Injection Inject 1.2 mg daily         OBJECTIVE:        Vitals:         Current: 3/13/2018 3:53:49 PM    Ht:  6 ft, 0 in;  Wt: 223 lbs;  BMI: 30.2    T: 98 F (oral);  BP: 154/74 mm Hg (right arm, sitting);  P: 100 bpm (right arm (BP Cuff), sitting);  sCr: 1.39 mg/dL;  GFR: 62.72        Exams:     PHYSICAL EXAM:     GENERAL: Vitals recorded well developed, well nourished;     NECK: range of motion is normal; thyroid is non-palpable;     RESPIRATORY: normal respiratory rate and pattern with no distress; normal breath sounds with no rales, rhonchi, wheezes or rubs;     CARDIOVASCULAR: normal rate; rhythm is regular;  no systolic murmur;     GASTROINTESTINAL: nontender; normal bowel sounds; no masses;     SKIN:  no significant rashes or lesions; no suspicious moles;     Foot exam performed.      Left foot exam    Protective sensation using Monofilament test: NORMAL sensation. Patient detects .07 grams of force which is considered normal.    Vascular status: normal peripheral vascular exam with palpable dorsal pedal and posterior tibal pulses and brisk digital capillary refill    Skin is intact without sores or ulcers    Right foot exam    Protective sensation using Monofilament test: NORMAL sensation. Patient detects .07 grams of force which is considered normal.    Vascular status: normal peripheral vascular exam  with palpable dorsal pedal and posterior tibal pulses and brisk digital capillary refill    Skin is intact without sores or ulcers         ASSESSMENT           250.00   E11.9  Type 2 diabetes              DDx:     401.1   I10  Hypertension              DDx:     272.4   E78.2  Hyperlipidemia              DDx:         ORDERS:         Meds Prescribed:       Refill of: Levemir FlexTouch (Insulin Detemir (rDNA)) 100units/1ml Injection Inject 40 units qhs E11.9 QS for 90 day(s) Refills: 1       Refill of: Simvastatin 80mg Tablet Take 1 table by mouth at bedtime  #90 (Ninety) tablet(s) Refills: 1       Lisinopril/Hydrochlorothiazide 10mg/12.5mg Tablet Take 1 tablet(s) by mouth daily  #90 (Ninety) tablet(s) Refills: 1         Other Orders:       2028F  Foot examination performed (includes examination through visual inspection, sensory exam with monofi  (In-House)                   PLAN:          Type 2 diabetes         RECOMMENDATIONS given include: Today, we have reviewed CHANEL's care.  He seems well today.  I'm going to refill needed medications today.  He is going to continue to see endocrinology at this time.  We will recheck his blood pressure prior to leaving.  I might change the lisinopril..            Prescriptions:       Refill of: Levemir FlexTouch (Insulin Detemir (rDNA)) 100units/1ml Injection Inject 40 units qhs E11.9 QS for 90 day(s) Refills: 1           Orders:       2028F  Foot examination performed (includes examination through visual inspection, sensory exam with monofi  (In-House)             Patient Education Handouts:       [facility]           Hypertension As above.           Prescriptions:       Lisinopril/Hydrochlorothiazide 10mg/12.5mg Tablet Take 1 tablet(s) by mouth daily  #90 (Ninety) tablet(s) Refills: 1          Hyperlipidemia As above.           Prescriptions:       Refill of: Simvastatin 80mg Tablet Take 1 table by mouth at bedtime  #90 (Ninety) tablet(s) Refills: 1              CHARGE CAPTURE           **Please note: ICD descriptions below are intended for billing purposes only and may not represent clinical diagnoses**        Primary Diagnosis:         250.00 Type 2 diabetes            E11.9    Type 2 diabetes mellitus without complications              Orders:          [id number]   Office/outpatient visit; established patient, level 4  (In-House)             2028F   Foot examination performed (includes examination through visual inspection, sensory exam with monofi  (In-House)           401.1 Hypertension            I10    Essential (primary) hypertension    272.4 Hyperlipidemia            E78.2    Mixed hyperlipidemia

## 2021-05-18 NOTE — PROGRESS NOTES
Nicholas Givens 1960     Office/Outpatient Visit    Visit Date: Thu, Dec 6, 2018 11:38 am    Provider: Ines Hicks N.P. (Assistant: Araceli Interiano MA)    Location: [facility]        Electronically signed by Ines Hicks N.P. on  12/06/2018 12:27:51 PM                             SUBJECTIVE:        CC:     CHANEL is a 58 year old White male.  presents today due to complaints of congestion X 1 month         HPI:         CHANEL presents with upper respiratory illness.  These have been present for the past 3 weeks.  The symptoms include nasal congestion and sinus pain/pressure.  He denies cough or fever.  He has already tried to relieve the symptoms with sinus rinses.  Medical history is significant for Trach, laryngeal CA.      ROS:     CONSTITUTIONAL:  Negative for chills and fever.      EYES:  Negative for blurred vision and eye drainage.      E/N/T:  Positive for nasal congestion and sinus pressure.   Negative for ear pain or sore throat.      CARDIOVASCULAR:  Negative for chest pain and palpitations.      RESPIRATORY:  Negative for dyspnea and frequent wheezing.          [facility]/:     Last Reviewed on 9/26/2018 11:58 AM by Buck Aparicio    Past Medical History:             PAST MEDICAL HISTORY         Type 2 Diabetes    Hyperlipidemia    Hypertension    Hypothyroidism     Laryngeal Cancer         PREVENTIVE HEALTH MAINTENANCE             COLORECTAL CANCER SCREENING: Up to date (colonoscopy q10y; sigmoidoscopy q5y; Cologuard q3y) was last done 11/2009, Results are in chart; colonoscopy with normal results; hemorrhoids     EYE EXAM: Diabetic Eye Exam during this calendar year and results are in chart was last done 08/2018         Family History:         Positive for Coronary Artery Disease ( brother ), Hypertension ( father; mother ) and Myocardial Infarction ( father ).      Positive for Asthma ( sister ).      Positive for Type 2 Diabetes ( mother ).          Social History:      Occupation: a construction     Marital Status: Single         Tobacco/Alcohol/Supplements:     Last Reviewed on 9/26/2018 11:58 AM by Buck Aparicio    Tobacco: He has a past history of cigarette smoking; quit date:  10/06.      Caffeine:  He admits to consuming caffeine via tea ( 2 servings per day ).          Substance Abuse History:     Last Reviewed on 9/26/2018 11:58 AM by Buck Aparicio        Marijuana: Current use.          Mental Health History:     Last Reviewed on 9/26/2018 11:58 AM by Buck Aparicio        Communicable Diseases (eg STDs):     Last Reviewed on 9/26/2018 11:58 AM by Buck Aparicio            Current Problems:     Last Reviewed on 9/26/2018 11:58 AM by Buck Aparicio    Acute renal failure, NEC     CAD     Hypertension     Hyperlipidemia     Type 2 diabetes     Laryngeal cancer, NOS     Screening for cancer of colon     History of pulmonary embolism     Tracheostomy status     Peripheral neuropathy attributed to type II diabetes     Screening for prostate cancer         Immunizations:     zzPrevnar-13 8/12/2015     zzFluzone pf-quadrivalent 3 and up 10/27/2015     zzFluzone pf-quadrivalent 3 and up 10/21/2016     zzFluzone pf-quadrivalent 3 and up 10/23/2017     Fluzone (3 + years dose) 12/16/2008     Fluzone (3 + years dose) 12/7/2010     Fluzone pf (3+ years dose) 10/25/2013     Fluzone pf (3+ years dose) 10/14/2014     Fluzone Quadrivalent (3+ years) 10/12/2018     Influenza A (H1N1), IM (3+ years) Monovalent 12/2/2009     Pneomovax 12/7/2010         Allergies:     Last Reviewed on 9/26/2018 11:58 AM by Buck Aparicio      No Known Drug Allergies.         Current Medications:     Last Reviewed on 9/26/2018 11:58 AM by Buck Aparicio    Irbesartan 150mg Tablet Take 1 tablet(s) by mouth daily     Simvastatin 80mg Tablet Take 1 table by mouth at bedtime     Levemir FlexTouch 100units/1ml Injection Inject 50 U at HS     Glimepiride 4mg  Tablet One po bid     Lancet   Lancet Check blood once daily as directed for ONE TOUCH ULTRA  DIAG E11.9     Levothyroxine Sodium 50mcg Capsules 1 p.o. daily     Metformin HCl 1,000mg Tablets, Extended Release Take one PO BID     Victoza 3-Hubert 18mg/3ml Injection Inject 1.2 mg daily     Aspirin (ASA) 325mg Caplet One a day         OBJECTIVE:        Vitals:         Historical:     09/26/2018  BP:   163/72 mm Hg ( (right arm, , sitting, );)         Current: 12/6/2018 11:43:49 AM    Ht:  6 ft, 0 in;  Wt: 221.8 lbs;  BMI: 30.1    T: 98.1 F (oral);  BP: 178/159 mm Hg (right arm, sitting);  P: 90 bpm (right arm (BP Cuff), sitting);  sCr: 1.34 mg/dL;  GFR: 64.15        Repeat:     11:44:30 AM     BP:   154/70mm Hg (right arm, sitting)         Exams:     PHYSICAL EXAM:     GENERAL: well developed, well nourished;  no apparent distress;     EYES: PERRL, EOMI     E/N/T: EARS: external auditory canal normal;  both TMs are dull;  NOSE: bilateral maxillary and bilateral frontal sinus tenderness present; OROPHARYNX: oral mucosa is normal; posterior pharynx shows no exudate and post nasal drip;     NECK: range of motion is normal; trachea is midline;     RESPIRATORY: normal respiratory rate and pattern with no distress; normal breath sounds with no rales, rhonchi, wheezes or rubs;     CARDIOVASCULAR: normal rate; rhythm is regular;     MUSCULOSKELETAL: normal gait;     NEUROLOGIC: mental status: alert and oriented x 3; GROSSLY INTACT     PSYCHIATRIC: appropriate affect and demeanor;         ASSESSMENT           461.8   J01.90  Acute sinusitis, other              DDx:         ORDERS:         Meds Prescribed:       Amoxicillin 875mg Tablet One PO BID X 10 days.  #20 (Twenty) tablet(s) Refills: 0                 PLAN:          Acute sinusitis, other         RECOMMENDATIONS given include: Push Fluids, Rest, Follow up if no improvement or worsening symptoms like high fevers, vomiting, weakness, or increasing shortness of air.    .   Discussed consideration for repeat follow up with ENT for nasal polyp. He has seen them in the past.     FOLLOW-UP: Schedule follow-up appointments on a p.r.n. basis. Chronic visit follow up           Prescriptions:       Amoxicillin 875mg Tablet One PO BID X 10 days.  #20 (Twenty) tablet(s) Refills: 0             Patient Recommendations:        For  Acute sinusitis, other:     Schedule follow-up appointments as needed.              CHARGE CAPTURE           **Please note: ICD descriptions below are intended for billing purposes only and may not represent clinical diagnoses**        Primary Diagnosis:         461.8 Acute sinusitis, other            J01.90    Acute sinusitis, unspecified              Orders:          11861   Office/outpatient visit; established patient, level 3  (In-House)

## 2021-05-18 NOTE — PROGRESS NOTES
Nicholas Givens 1960     Office/Outpatient Visit    Visit Date: Wed, Sep 26, 2018 11:19 am    Provider: Buck Aparicio MD (Assistant: Jeri Almazan RN)    Location: [facility]        Electronically signed by Buck Aparicio MD on  09/26/2018 12:49:20 PM                             SUBJECTIVE:        CC: acute renal failure, blood pressure, diabetes, cholesterol         HPI:     CHANEL is back today for follow up on his visit from last week.  He was noted on kidney function testing to have an elevated BUN and creatinine.  We did make some adjustment to his medications at that time as a result.  Specifically, we asked CHANEL to NOT take lisinopril/HCTZ or metformin.  We also asked him to only take 1/2 tablet of simvastatin.  He did have repeat urine, blood, and ultrasound testing.  The renal ultrasound is normal.  The kidney functions are basically normal as well.  The creatinine has dropped from approximately 2 to approximately 1.3.  He feels okay.     ROS:     CONSTITUTIONAL:  Negative for chills and fever.      CARDIOVASCULAR:  Negative for chest pain and palpitations.      RESPIRATORY:  Negative for recent cough and dyspnea.      GASTROINTESTINAL:  Negative for abdominal pain, nausea and vomiting.          [facility]/:     Last Reviewed on 9/26/2018 11:57 AM by uBck Aparicio    Past Medical History:             PAST MEDICAL HISTORY         Type 2 Diabetes    Hyperlipidemia    Hypertension    Hypothyroidism     Laryngeal Cancer         PREVENTIVE HEALTH MAINTENANCE             COLORECTAL CANCER SCREENING: Up to date (colonoscopy q10y; sigmoidoscopy q5y; Cologuard q3y) was last done 11/2009, Results are in chart; colonoscopy with normal results; hemorrhoids     EYE EXAM: Diabetic Eye Exam during this calendar year and results are in chart was last done 08/2018         Family History:         Positive for Coronary Artery Disease ( brother ), Hypertension ( father; mother ) and Myocardial  Infarction ( father ).      Positive for Asthma ( sister ).      Positive for Type 2 Diabetes ( mother ).          Social History:     Occupation: a construction     Marital Status: Single         Tobacco/Alcohol/Supplements:     Last Reviewed on 9/26/2018 11:57 AM by Buck Aparicio    Tobacco: He has a past history of cigarette smoking; quit date:  10/06.      Caffeine:  He admits to consuming caffeine via tea ( 2 servings per day ).          Substance Abuse History:     Last Reviewed on 9/26/2018 11:57 AM by Buck Aparicio        Marijuana: Current use.          Mental Health History:     Last Reviewed on 9/26/2018 11:57 AM by Buck Aparicio        Communicable Diseases (eg STDs):     Last Reviewed on 9/26/2018 11:57 AM by Buck Aparicio            Current Problems:     Last Reviewed on 9/26/2018 11:57 AM by Buck Aparicio    Acute renal failure, NEC     CAD     Hypertension     Hyperlipidemia     Type 2 diabetes     Laryngeal cancer, NOS     Screening for cancer of colon     History of pulmonary embolism     Tracheostomy status     Peripheral neuropathy attributed to type II diabetes     Screening for prostate cancer         Immunizations:     zzPrevnar-13 8/12/2015     zzFluzone pf-quadrivalent 3 and up 10/27/2015     zzFluzone pf-quadrivalent 3 and up 10/21/2016     zzFluzone pf-quadrivalent 3 and up 10/23/2017     Fluzone (3 + years dose) 12/16/2008     Fluzone (3 + years dose) 12/7/2010     Fluzone pf (3+ years dose) 10/25/2013     Fluzone pf (3+ years dose) 10/14/2014     Influenza A (H1N1), IM (3+ years) Monovalent 12/2/2009     Pneomovax 12/7/2010         Allergies:     Last Reviewed on 9/26/2018 11:57 AM by Buck Aparicio      No Known Drug Allergies.         Current Medications:     Last Reviewed on 9/26/2018 11:57 AM by Buck Aparicio    Lisinopril/Hydrochlorothiazide 10mg/12.5mg Tablet Take 1 tablet(s) by mouth daily     Simvastatin 80mg Tablet  Take 1 table by mouth at bedtime     Glimepiride 4mg Tablet One po bid     Lancet   Lancet Check blood once daily as directed for ONE TOUCH ULTRA  DIAG E11.9     Levothyroxine Sodium 50mcg Capsules 1 p.o. daily     Metformin HCl 1,000mg Tablets, Extended Release Take one PO BID     Victoza 3-Hubert 18mg/3ml Injection Inject 1.2 mg daily     Aspirin (ASA) 325mg Caplet One a day     Levemir FlexTouch 100units/1ml Injection Inject 50 U at HS         OBJECTIVE:        Vitals:         Current: 9/26/2018 11:20:57 AM    Ht:  6 ft, 0 in;  Wt: 214.4 lbs;  BMI: 29.1    T: 98.4 F (oral);  BP: 163/72 mm Hg (right arm, sitting);  P: 79 bpm (right arm (BP Cuff), sitting);  sCr: 1.34 mg/dL;  GFR: 63.23        Exams:     PHYSICAL EXAM:     GENERAL: Vitals recorded well developed, well nourished;     NECK: range of motion is normal; thyroid is non-palpable;     RESPIRATORY: normal respiratory rate and pattern with no distress; normal breath sounds with no rales, rhonchi, wheezes or rubs;     CARDIOVASCULAR: normal rate; rhythm is regular;  no systolic murmur; no edema;     GASTROINTESTINAL: nontender; normal bowel sounds; no masses;     SKIN:  no significant rashes or lesions; no suspicious moles;         ASSESSMENT           584.8   N18.3   N17.9  Acute renal failure, NEC              DDx:     401.1   I10  Hypertension              DDx:     272.4   E78.2  Hyperlipidemia              DDx:     250.00   E11.9  Type 2 diabetes              DDx:         ORDERS:         Meds Prescribed:       Irbesartan 150mg Tablet Take 1 tablet(s) by mouth daily  #90 (Ninety) tablet(s) Refills: 1                 PLAN:          Acute renal failure, NEC         RECOMMENDATIONS given include: Today, we have reviewed his recent care including labs and ultrasound.  I do think the HCTZ may have been contributing to the issues in combination with lisinopril.  His numbers are in a much better range today.  We will ask TJ to resume metformin.  I also want him to  continue the simvastatin but at 1/2 pill daily.  With regard to the blood pressure, I'm going to change him over to an ARB as noted below.  We will ask him to STOP lisinopril with HCTZ completely given the concerns.  No other near term changes are anticipated.  He should stop in on Saturday for another blood pressure check..           Hypertension           Prescriptions:       Irbesartan 150mg Tablet Take 1 tablet(s) by mouth daily  #90 (Ninety) tablet(s) Refills: 1           Patient Education Handouts:       High Blood Pressure (HTN)           Hyperlipidemia As above.          Type 2 diabetes As above.             CHARGE CAPTURE           **Please note: ICD descriptions below are intended for billing purposes only and may not represent clinical diagnoses**        Primary Diagnosis:         584.8 Acute renal failure, NEC            N18.3    Chronic kidney disease, stage 3 (moderate)           N17.9    Acute kidney failure, unspecified              Orders:          09171   Office/outpatient visit; established patient, level 4  (In-House)           401.1 Hypertension            I10    Essential (primary) hypertension    272.4 Hyperlipidemia            E78.2    Mixed hyperlipidemia    250.00 Type 2 diabetes            E11.9    Type 2 diabetes mellitus without complications

## 2021-05-18 NOTE — PROGRESS NOTES
Nicholas Givens ROSELYN  1960     Office/Outpatient Visit    Visit Date: Thu, Oct 15, 2020 03:13 pm    Provider: Buck Aparicio MD (Assistant: Jeri Almazan RN)    Location: [facility]        Electronically signed by Buck Aparicio MD on  10/15/2020 07:20:12 PM                             Subjective:        CC: diabetes, blood pressure, cholesterol    HPI:           Patient presents with type 2 diabetes mellitus without complications.  Current meds include an oral hypoglycemic ( Amaryl and Glucophage XR ) and insulin/injectable ( Levemir - 50 units qhs; NovoLog- 'it's a floating deal' ).  He reports home blood glucose readings have been a bit high, with average fasting readings in the 150-180 mg/dL range.  Most recent lab results include glycohemoglobin < 7.0%.            In regard to the hyperlipidemia, unspecified, current treatment includes Zocor and diet.  Compliance with treatment has been fair.  He denies experiencing any hypercholesterolemia related symptoms.            Additionally, he presents with history of essential (primary) hypertension.  his current cardiac medication regimen includes an angiotensin receptor blocker ( Avapro ).  He is tolerating the medication well without side effects.  Compliance with treatment has been good; he takes his medication as directed and follows up as directed.      ROS:     CONSTITUTIONAL:  Negative for chills and fever.      CARDIOVASCULAR:  Negative for chest pain and palpitations.      RESPIRATORY:  Negative for recent cough and dyspnea.      GASTROINTESTINAL:  Negative for abdominal pain, nausea and vomiting.      INTEGUMENTARY:  Negative for atypical mole(s) and rash.          Past Medical History / Family History / Social History:         Last Reviewed on 10/15/2020 03:53 PM by Buck Aparicio    Past Medical History:             PAST MEDICAL HISTORY         Type 2 Diabetes    Hyperlipidemia    Hypertension    Hypothyroidism      Laryngeal Cancer             ADVANCED DIRECTIVES: None - His daughter, Crystal, would make decisions for him if needed.         PREVENTIVE HEALTH MAINTENANCE             COLORECTAL CANCER SCREENING: Up to date (colonoscopy q10y; sigmoidoscopy q5y; Cologuard q3y) was last done 07/2020, Results are in chart; colonoscopy with the following abnormalities noted-- serrated polyp     EYE EXAM: Diabetic Eye Exam during this calendar year and results are in chart was last done 08/2020         Family History:         Positive for Coronary Artery Disease ( brother ), Hypertension ( father; mother ) and Myocardial Infarction ( father ).      Positive for Asthma ( sister ).      Positive for Type 2 Diabetes ( mother ).          Social History:     Occupation: a construction     Marital Status: Single         Tobacco/Alcohol/Supplements:     Last Reviewed on 10/15/2020 03:53 PM by Buck Aparicio    Tobacco: He has a past history of cigarette smoking; quit date:  10/06.      Caffeine:  He admits to consuming caffeine via tea ( 2 servings per day ).          Substance Abuse History:     Last Reviewed on 10/15/2020 03:53 PM by Buck Aparicio        Marijuana: Current use.          Mental Health History:     Last Reviewed on 10/15/2020 03:53 PM by Buck Aparicio        Communicable Diseases (eg STDs):     Last Reviewed on 10/15/2020 03:53 PM by Buck Aparicio        Current Problems:     Last Reviewed on 10/15/2020 03:53 PM by Buck Aparicio    Type 2 diabetes mellitus without complications    Hyperlipidemia, unspecified    Essential (primary) hypertension    Encounter for screening for malignant neoplasm of prostate    Acute kidney failure, unspecified    Chronic kidney disease, stage 3 (moderate)    Encounter for screening for malignant neoplasm of colon    Encounter for other preprocedural examination    Encounter for immunization        Immunizations:     zzPrevnar-13 8/12/2015    Viola  pf-quadrivalent 3 and up 10/27/2015    zzFluzone pf-quadrivalent 3 and up 10/21/2016    zzFluzone pf-quadrivalent 3 and up 10/23/2017    Fluzone (3 + years dose) 12/16/2008    Fluzone (3 + years dose) 12/7/2010    Fluzone pf (3+ years dose) 10/25/2013    Fluzone pf (3+ years dose) 10/14/2014    Fluzone Quadrivalent (3+ years) 10/12/2018    Fluzone Quadrivalent (3+ years) 10/16/2019    Influenza A (H1N1), IM (3+ years) Monovalent 12/2/2009    Pneomovax 12/7/2010        Allergies:     Last Reviewed on 10/15/2020 03:53 PM by Buck Aparicio    No Known Allergies.        Current Medications:     Last Reviewed on 10/15/2020 03:53 PM by Buck Aparicio    aspirin 325 mg oral tablet [One a day]    Lancet   Lancet [Check blood once daily as directed for ONE TOUCH ULTRA  DIAG E11.9]    simvastatin 80 mg oral tablet [TAKE 1 TABLET BY MOUTH AT BEDTIME]    Levemir FlexTouch U-100 Insuln 100 unit/mL (3 mL) subcutaneous Insulin Pen [Inject 50 U at HS]    Glimepiride 4 mg oral tablet [One po bid]    Victoza 3-Hubert 0.6 mg/0.1 mL (18 mg/3 mL) subcutaneous Pen Injector [Inject 1.2 mg daily]    Levothyroxine Sodium 50mcg Capsules [1 capsules BID on Sat and Sunday, and 1 capsule daily rest.]    irbesartan 150 mg oral tablet [Take 1 tablet by mouth once daily]    Novolog Flex Pen* 15 ml pen [3-11 units QID per sliding scale]    hydroCHLOROthiazide 12.5 mg oral tablet [1 po daily]    Lumigan 0.01 % ophthalmic (eye) Drops [1 drop to each eyes daily]    METFORMIN    TAB 500MG  [1 TAB IN AM AND 2 TABS AT HS]        Objective:        Vitals:         Current: 10/15/2020 3:18:47 PM    Ht:  6 ft, 0 in;  Wt: 208.8 lbs;  BMI: 28.3T: 97.5 F (temporal);  BP: 127/68 mm Hg (left arm, sitting);  P: 79 bpm (left arm (BP Cuff), sitting);  sCr: 1.34 mg/dL;  GFR: 61.02        Exams:     PHYSICAL EXAM:     GENERAL: Vitals recorded well developed, well nourished;     EYES: extraocular movements intact; conjunctiva and cornea are normal; PERRL;      NECK: range of motion is normal; thyroid is non-palpable;     RESPIRATORY: normal respiratory rate and pattern with no distress; normal breath sounds with no rales, rhonchi, wheezes or rubs;     CARDIOVASCULAR: normal rate; rhythm is regular;  no systolic murmur;     GASTROINTESTINAL: nontender; normal bowel sounds; no masses;     NEUROLOGIC: mental status: alert and oriented x 3; cranial nerves II-XII grossly intact;     PSYCHIATRIC: appropriate affect and demeanor; normal psychomotor function;         Assessment:         E11.9   Type 2 diabetes mellitus without complications       E78.5   Hyperlipidemia, unspecified       I10   Essential (primary) hypertension       Z23   Encounter for immunization           ORDERS:         Meds Prescribed:       [Refilled] simvastatin 80 mg oral tablet [TAKE 1 TABLET BY MOUTH AT BEDTIME], #90 (ninety) tablets, Refills: 3 (three)       [Refilled] irbesartan 150 mg oral tablet [Take 1 tablet by mouth once daily], #90 (ninety) tablets, Refills: 3 (three)         Radiology/Test Orders:       3017F  Colorectal CA screen results documented and reviewed (PV)  (In-House)            2022F  Dilated retinal eye exam w/interpret by ophthalmologist/optometrist documented/reviewed (DM)4  (In-House)              Lab Orders:       [id number]  LPDP - [facility] Lipid Panel  (Send-Out)            26031  CK - [facility]- CK total  (Send-Out)              Procedures Ordered:       24306  PNEUMOVAX 23  (In-House)              Other Orders:       92835  Influenza virus vaccine, quadrivalent, split virus, preservative free 3 years of age & older  (In-House)            1101F  Pt screen for fall risk; document no falls in past year or only 1 fall w/o injury in past year (ROGER)  (In-House)            1124F  Advance Care Planning discussed and doc in MR; no surrogate named or advance care plan provided  (Send-Out)              Administration of influenza virus vaccine  (x1)          Administration of  pneumococcal vaccine  (x1)                  Plan:         Type 2 diabetes mellitus without complications        RECOMMENDATIONS given include: Today, we have reviewed CHANEL's care.  He is doing well overall.  His sugar has been under good control.  We will refill needed medications and follow from there.  Lipid panel to be updated..  TANIA Has had no falls or only one fall without injury in the past year Vaccines Flu and Pneumonia updated in Shot record Colorectal Cancer Screening is up to date and the results are in the chart Diabetic Eye Exam during this calendar year and results are in chart Advance Directive/Surrogate Decision Maker discussed and pt declines to complete today           Orders:       1101F  Pt screen for fall risk; document no falls in past year or only 1 fall w/o injury in past year (ROGER)  (In-House)            3017F  Colorectal CA screen results documented and reviewed (PV)  (In-House)            2022F  Dilated retinal eye exam w/interpret by ophthalmologist/optometrist documented/reviewed (DM)4  (In-House)            1124F  Advance Care Planning discussed and doc in MR; no surrogate named or advance care plan provided  (Send-Out)              Hyperlipidemia, unspecified    LABORATORY:  Labs ordered to be performed today include CK, total and lipid panel.            Prescriptions:       [Refilled] simvastatin 80 mg oral tablet [TAKE 1 TABLET BY MOUTH AT BEDTIME], #90 (ninety) tablets, Refills: 3 (three)       [Refilled] irbesartan 150 mg oral tablet [Take 1 tablet by mouth once daily], #90 (ninety) tablets, Refills: 3 (three)           Orders:       01523  LPDP - [facility] Lipid Panel  (Send-Out)            80690  CK - [facility]- CK total  (Send-Out)              Essential (primary) hypertensionAs above.        Encounter for immunization          Immunizations:       21190  Influenza virus vaccine, quadrivalent, split virus, preservative free 3 years of age & older  (In-House)      PT TOLERATED WELL           Dose (ml): 0.5  Site: left deltoid  Route: intramuscular  Administered by: Jeri Almazan          : [employer]  Lot #: [id number]  Exp: 06/30/2021          NDC: [address]-33        Administration of influenza virus vaccine  (x1)        93329  PNEUMOVAX 23  (In-House)                Dose (ml): 0.5  Site: right deltoid  Route: intramuscular  Administered by: Rosario Franklin          : [employer]  Lot #: [id number]  Exp: 09/01/2021          NDC: [address]-12        Administration of pneumococcal vaccine  (x1)              Charge Capture:         Primary Diagnosis:     E11.9  Type 2 diabetes mellitus without complications           Orders:      22835  Office/outpatient visit; established patient, level 4  (In-House)            1101F  Pt screen for fall risk; document no falls in past year or only 1 fall w/o injury in past year (ROGER)  (In-House)            3017F  Colorectal CA screen results documented and reviewed (PV)  (In-House)            2022F  Dilated retinal eye exam w/interpret by ophthalmologist/optometrist documented/reviewed (DM)4  (In-House)              E78.5  Hyperlipidemia, unspecified     I10  Essential (primary) hypertension     Z23  Encounter for immunization           Orders:      19339  Influenza virus vaccine, quadrivalent, split virus, preservative free 3 years of age & older  (In-House)              Administration of influenza virus vaccine  (x1)        07435  PNEUMOVAX 23  (In-House)              Administration of pneumococcal vaccine  (x1)

## 2021-05-18 NOTE — PROGRESS NOTES
Nicholas Givens  1960     Office/Outpatient Visit    Visit Date: Tue, Feb 25, 2020 03:34 pm    Provider: Buck Aparicio MD (Assistant: Jeri Almazan RN)    Location: [facility]        Electronically signed by Buck Aparicio MD on  02/26/2020 05:19:42 PM                             Subjective:        CC: blood pressure, cholesterol, diabetes    HPI:           Patient presents with hyperlipidemia, unspecified.  Current treatment includes Zocor and diet.  Compliance with treatment has been fair.  He denies experiencing any hypercholesterolemia related symptoms.            With regard to the essential (primary) hypertension, his current cardiac medication regimen includes an angiotensin receptor blocker ( Avapro ).  He is tolerating the medication well without side effects.  Compliance with treatment has been good; he takes his medication as directed and follows up as directed.            Additionally, he presents with history of type 2 diabetes mellitus without complications.  current meds include an oral hypoglycemic ( Amaryl and Glucophage XR ) and insulin/injectable ( Levemir - 50 units; NovoLog- sliding scale; Victoza ).  He had A1C in the last month or so and it was 6.7%.    ROS:     CONSTITUTIONAL:  Negative for chills and fever.      CARDIOVASCULAR:  Negative for chest pain and palpitations.      RESPIRATORY:  Negative for recent cough and dyspnea.      GASTROINTESTINAL:  Negative for abdominal pain, nausea and vomiting.      INTEGUMENTARY:  Negative for atypical mole(s) and rash.          Past Medical History / Family History / Social History:         Last Reviewed on 2/25/2020 04:02 PM by Buck Aparicio    Past Medical History:             PAST MEDICAL HISTORY         Type 2 Diabetes    Hyperlipidemia    Hypertension    Hypothyroidism     Laryngeal Cancer         PREVENTIVE HEALTH MAINTENANCE             COLORECTAL CANCER SCREENING: Up to date (colonoscopy q10y;  sigmoidoscopy q5y; Cologuard q3y) was last done 11/2009, Results are in chart; colonoscopy with normal results; hemorrhoids     EYE EXAM: Diabetic Eye Exam during this calendar year and results are in chart was last done 08/2018         Family History:         Positive for Coronary Artery Disease ( brother ), Hypertension ( father; mother ) and Myocardial Infarction ( father ).      Positive for Asthma ( sister ).      Positive for Type 2 Diabetes ( mother ).          Social History:     Occupation: a construction     Marital Status: Single         Tobacco/Alcohol/Supplements:     Last Reviewed on 2/25/2020 04:02 PM by Buck Aparicio    Tobacco: He has a past history of cigarette smoking; quit date:  10/06.      Caffeine:  He admits to consuming caffeine via tea ( 2 servings per day ).          Substance Abuse History:     Last Reviewed on 2/25/2020 04:02 PM by Buck Aparicio        Marijuana: Current use.          Mental Health History:     Last Reviewed on 2/25/2020 04:02 PM by Buck Aparicio        Communicable Diseases (eg STDs):     Last Reviewed on 2/25/2020 04:02 PM by Buck Aparicio        Current Problems:     Last Reviewed on 2/25/2020 04:02 PM by Buck Aparicio    Type 2 diabetes mellitus without complications    Hyperlipidemia, unspecified    Essential (primary) hypertension    Encounter for screening for malignant neoplasm of prostate    Acute kidney failure, unspecified    Chronic kidney disease, stage 3 (moderate)        Immunizations:     zzPrevnar-13 8/12/2015    zzFluzone pf-quadrivalent 3 and up 10/27/2015    zzFluzone pf-quadrivalent 3 and up 10/21/2016    zzFluzone pf-quadrivalent 3 and up 10/23/2017    Fluzone (3 + years dose) 12/16/2008    Fluzone (3 + years dose) 12/7/2010    Fluzone pf (3+ years dose) 10/25/2013    Fluzone pf (3+ years dose) 10/14/2014    Fluzone Quadrivalent (3+ years) 10/12/2018    Fluzone Quadrivalent (3+ years) 10/16/2019     Influenza A (H1N1), IM (3+ years) Monovalent 12/2/2009    Pneomovax 12/7/2010        Allergies:     Last Reviewed on 2/25/2020 04:02 PM by Buck Aparicio    No Known Allergies.        Current Medications:     Last Reviewed on 2/25/2020 04:02 PM by Buck Aparicio    metFORMIN 1,000 mg oral Tablet, Extended Release 24 hr [Take 1/2 tab in AM and one tab at HS]    aspirin 325 mg oral tablet [One a day]    Lancet   Lancet [Check blood once daily as directed for ONE TOUCH ULTRA  DIAG E11.9]    simvastatin 80 mg oral tablet [TAKE 1 TABLET BY MOUTH AT BEDTIME *NEEDS APPOINTMENT*]    Levemir FlexTouch U-100 Insuln 100 unit/mL (3 mL) subcutaneous Insulin Pen [Inject 50 U at HS]    Glimepiride 4 mg oral tablet [One po bid]    Levothyroxine Sodium 50mcg Capsules [1 capsules BID on Sunday, and 1 capsule daily rest.]    Victoza 3-Hubert 0.6 mg/0.1 mL (18 mg/3 mL) subcutaneous Pen Injector [Inject 1.2 mg daily]    irbesartan 150 mg oral tablet [TAKE 1 TABLET BY MOUTH ONCE DAILY ]    Novolog Flex Pen* 15 ml pen [3-11 units QID per sliding scale]    hydroCHLOROthiazide 12.5 mg oral tablet [1 po daily]    Lumigan 0.01 % ophthalmic (eye) Drops [1 drop to each eyes daily]        Objective:        Vitals:         Current: 2/25/2020 3:39:42 PM    Ht:  6 ft, 0 in;  Wt: 219.2 lbs;  BMI: 29.7T: 98.3 F (oral);  BP: 113/60 mm Hg (right arm, sitting);  P: 95 bpm (right arm (BP Cuff), sitting);  sCr: 1.34 mg/dL;  GFR: 63.06        Exams:     PHYSICAL EXAM:     GENERAL: Vitals recorded well developed, well nourished;     NECK: range of motion is normal; thyroid is non-palpable;     RESPIRATORY: normal respiratory rate and pattern with no distress; normal breath sounds with no rales, rhonchi, wheezes or rubs;     CARDIOVASCULAR: normal rate; rhythm is regular;  no systolic murmur;     GASTROINTESTINAL: nontender; normal bowel sounds; no masses;     LYMPHATIC: no enlargement of cervical or facial nodes; no supraclavicular nodes;      SKIN:  no significant rashes or lesions; no suspicious moles;     NEUROLOGIC: mental status: alert and oriented x 3; cranial nerves II-XII grossly intact;     PSYCHIATRIC: appropriate affect and demeanor; normal psychomotor function;         Assessment:         E78.5   Hyperlipidemia, unspecified       I10   Essential (primary) hypertension       E11.9   Type 2 diabetes mellitus without complications       Z12.5   Encounter for screening for malignant neoplasm of prostate       Z12.11   Encounter for screening for malignant neoplasm of colon           ORDERS:         Meds Prescribed:       [Refilled] simvastatin 80 mg oral tablet [TAKE 1 TABLET BY MOUTH AT BEDTIME ], #90 (ninety) each, Refills: 1 (one)       [Refilled] irbesartan 150 mg oral tablet [TAKE 1 TABLET BY MOUTH ONCE DAILY ], #90 (ninety) each, Refills: 1 (one)         Lab Orders:       [id number]  [facility] PSA Screen or Medicare screening order:   (Send-Out)              Other Orders:       [id number]  Cologuard  (Send-Out)              Depression screen negative  (In-House)                      Plan:         Hyperlipidemia, unspecified        RECOMMENDATIONS given include: TJ is doing well.  We have reviewed recent testing.  Labs to be updated with next set for endocrinology.  No changes anticipated..  MIPS PHQ-9 Depression Screening: Completed form scanned and in chart; Total Score 2; Negative Depression Screen           Prescriptions:       [Refilled] simvastatin 80 mg oral tablet [TAKE 1 TABLET BY MOUTH AT BEDTIME ], #90 (ninety) each, Refills: 1 (one)           Orders:         Depression screen negative  (In-House)              Essential (primary) hypertension          Prescriptions:       [Refilled] irbesartan 150 mg oral tablet [TAKE 1 TABLET BY MOUTH ONCE DAILY ], #90 (ninety) each, Refills: 1 (one)         Type 2 diabetes mellitus without complicationsAs above.        Encounter for screening for malignant neoplasm of prostate           Orders:       [id number]  [facility] PSA Screen or Medicare screening order:   (Send-Out)              Encounter for screening for malignant neoplasm of colon          Orders:       [id number]  Cologuard  (Send-Out)                  Charge Capture:         Primary Diagnosis:     E78.5  Hyperlipidemia, unspecified           Orders:      12730  Office/outpatient visit; established patient, level 4  (In-House)              Depression screen negative  (In-House)              I10  Essential (primary) hypertension     E11.9  Type 2 diabetes mellitus without complications     Z12.5  Encounter for screening for malignant neoplasm of prostate     Z12.11  Encounter for screening for malignant neoplasm of colon

## 2021-05-18 NOTE — PROGRESS NOTES
Nicholas Givens 1960     Office/Outpatient Visit    Visit Date: Thu, Sep 20, 2018 11:31 am    Provider: Buck Aparicio MD (Assistant: Melissa Reynolds)    Location: [facility]        Electronically signed by Buck Aparicio MD on  09/20/2018 09:01:22 PM                             SUBJECTIVE:        CC: blood pressure, cholesterol, diabetes         HPI:         Patient presents with hypertension.  His current cardiac medication regimen includes a combination medication ( Zestoretic ).  He is tolerating the medication well without side effects.  Compliance with treatment has been good; he takes his medication as directed and follows up as directed.          Additionally, he presents with history of hyperlipidemia.  current treatment includes Zocor and diet.  Compliance with treatment has been fair.  He denies experiencing any hypercholesterolemia related symptoms.          In regard to the type 2 diabetes, current meds include an oral hypoglycemic ( Amaryl and Glucophage XR ) and insulin/injectable ( Levemir; Victoza ).      ROS:     CONSTITUTIONAL:  Positive for fatigue and decreasing stamina.   Negative for chills or fever.      CARDIOVASCULAR:  Negative for chest pain and palpitations.      RESPIRATORY:  Negative for recent cough and dyspnea.      GASTROINTESTINAL:  Negative for abdominal pain, nausea and vomiting.          PM/[facility]/:     Last Reviewed on 9/20/2018 11:43 AM by Buck Aparicio    Past Medical History:             PAST MEDICAL HISTORY         Type 2 Diabetes    Hyperlipidemia    Hypertension    Hypothyroidism     Laryngeal Cancer         PREVENTIVE HEALTH MAINTENANCE             COLORECTAL CANCER SCREENING: Up to date (colonoscopy q10y; sigmoidoscopy q5y; Cologuard q3y) was last done 11/2009, Results are in chart; colonoscopy with normal results; hemorrhoids     EYE EXAM: Diabetic Eye Exam during this calendar year and results are in chart was last done 08/2018          Family History:         Positive for Coronary Artery Disease ( brother ), Hypertension ( father; mother ) and Myocardial Infarction ( father ).      Positive for Asthma ( sister ).      Positive for Type 2 Diabetes ( mother ).          Social History:     Occupation: a construction     Marital Status: Single         Tobacco/Alcohol/Supplements:     Last Reviewed on 9/20/2018 11:43 AM by Buck Aparicio    Tobacco: He has a past history of cigarette smoking; quit date:  10/06.      Caffeine:  He admits to consuming caffeine via tea ( 2 servings per day ).          Substance Abuse History:     Last Reviewed on 9/20/2018 11:43 AM by Buck Aparicio        Marijuana: Current use.          Mental Health History:     Last Reviewed on 9/20/2018 11:43 AM by Buck Aparicio        Communicable Diseases (eg STDs):     Last Reviewed on 9/20/2018 11:43 AM by Buck Aparicio            Current Problems:     Last Reviewed on 9/20/2018 11:43 AM by Buck Aparicio    CAD     Hypertension     Hyperlipidemia     Type 2 diabetes     Laryngeal cancer, NOS     Screening for cancer of colon     History of pulmonary embolism     Tracheostomy status     Peripheral neuropathy attributed to type II diabetes     Screening for prostate cancer         Immunizations:     zzPrevnar-13 8/12/2015     zzFluzone pf-quadrivalent 3 and up 10/27/2015     zzFluzone pf-quadrivalent 3 and up 10/21/2016     zzFluzone pf-quadrivalent 3 and up 10/23/2017     Fluzone (3 + years dose) 12/16/2008     Fluzone (3 + years dose) 12/7/2010     Fluzone pf (3+ years dose) 10/25/2013     Fluzone pf (3+ years dose) 10/14/2014     Influenza A (H1N1), IM (3+ years) Monovalent 12/2/2009     Pneomovax 12/7/2010         Allergies:     Last Reviewed on 9/20/2018 11:43 AM by Buck Aparicio      No Known Drug Allergies.         Current Medications:     Last Reviewed on 9/20/2018 11:43 AM by Buck Aparicio     Lisinopril/Hydrochlorothiazide 10mg/12.5mg Tablet Take 1 tablet(s) by mouth daily     Simvastatin 80mg Tablet Take 1 table by mouth at bedtime     Levemir FlexTouch 100units/1ml Injection Inject 40 units qhs E11.9     Glimepiride 4mg Tablet One po bid     Lancet   Lancet Check blood once daily as directed for ONE TOUCH ULTRA  DIAG E11.9     Levothyroxine Sodium 50mcg Capsules 1 p.o. daily     Metformin HCl 1,000mg Tablets, Extended Release Take one PO BID     Victoza 3-Hubert 18mg/3ml Injection Inject 1.2 mg daily     Aspirin (ASA) 325mg Caplet One a day         OBJECTIVE:        Vitals:         Current: 9/20/2018 11:34:31 AM    Ht:  6 ft, 0 in;  Wt: 207 lbs;  BMI: 28.1    T: 97.6 F (oral);  BP: 136/101 mm Hg (left arm, sitting);  P: 95 bpm (left arm (BP Cuff), sitting);  sCr: 1.39 mg/dL;  GFR: 60.05    O2 Sat: 94 % (room air)        Repeat:     11:37:59 AM     BP:   131/69mm Hg (right arm, sitting)         Exams:     PHYSICAL EXAM:     GENERAL: Vitals recorded well developed, well nourished;     EYES: extraocular movements intact; conjunctiva and cornea are normal; PERRL;     E/N/T: EARS:  normal external auditory canals and tympanic membranes;  grossly normal hearing; OROPHARYNX:  normal mucosa, dentition, gingiva, and posterior pharynx;     NECK: range of motion is normal; thyroid is non-palpable;     RESPIRATORY: normal respiratory rate and pattern with no distress; normal breath sounds with no rales, rhonchi, wheezes or rubs;     CARDIOVASCULAR: normal rate; rhythm is regular;  no systolic murmur;     GASTROINTESTINAL: nontender; normal bowel sounds; no masses;     LYMPHATIC: no enlargement of cervical or facial nodes; no supraclavicular nodes;     PSYCHIATRIC: appropriate affect and demeanor;         ASSESSMENT           401.1   I10  Hypertension              DDx:     272.4   E78.2  Hyperlipidemia              DDx:     250.00   E11.9  Type 2 diabetes              DDx:         ORDERS:         Meds Prescribed:        Refill of: Lisinopril/Hydrochlorothiazide 10mg/12.5mg Tablet Take 1 tablet(s) by mouth daily  #90 (Ninety) tablet(s) Refills: 1       Refill of: Simvastatin 80mg Tablet Take 1 table by mouth at bedtime  #90 (Ninety) tablet(s) Refills: 1         Lab Orders:       [id number]  HTNLP - [facility] CMP AND LIPID: [id number], [id number]  (Send-Out)  (ARRANGE FASTING LABS)       [id number]  CK - [facility]- CK total  (Send-Out)         19324  MG - [facility] Magnesium, Serum  (Send-Out)           Other Orders:         Calculated BMI above the upper parameter and a follow-up plan was documented in the medical record  (In-House)                   PLAN:          Hypertension         RECOMMENDATIONS given include: JABIER seems well today.  I'm going to refill his medications as noted and arrange repeat labs as noted below.  He has had TSH and A1C done recently.  I'm pleased with how things stand at this time.  He wants to get his flu shot next month..  MIPS     BMI Elevated - Follow-Up Plan: He was provided education on weight loss strategies           Prescriptions:       Refill of: Lisinopril/Hydrochlorothiazide 10mg/12.5mg Tablet Take 1 tablet(s) by mouth daily  #90 (Ninety) tablet(s) Refills: 1       Refill of: Simvastatin 80mg Tablet Take 1 table by mouth at bedtime  #90 (Ninety) tablet(s) Refills: 1           Orders:         Calculated BMI above the upper parameter and a follow-up plan was documented in the medical record  (In-House)             Patient Education Handouts:       High Blood Pressure (HTN)           Hyperlipidemia     LABORATORY:  Labs ordered to be performed today include CK, total, HTN/Lipid Panel: CMP, Lipid, and Magnesium level.            Orders:       [id number]  HTNLP - [facility] CMP AND LIPID: [id number], [id number]  (Send-Out)  (ARRANGE FASTING LABS)       70454  CK - [facility]- CK total  (Send-Out)         91995  MG - [facility] Magnesium, Serum  (Send-Out)            Type 2 diabetes As above.             CHARGE CAPTURE           **Please note: ICD descriptions  below are intended for billing purposes only and may not represent clinical diagnoses**        Primary Diagnosis:         401.1 Hypertension            I10    Essential (primary) hypertension              Orders:          [id number]   Office/outpatient visit; established patient, level 4  (In-House)                Calculated BMI above the upper parameter and a follow-up plan was documented in the medical record  (In-House)           272.4 Hyperlipidemia            E78.2    Mixed hyperlipidemia    250.00 Type 2 diabetes            E11.9    Type 2 diabetes mellitus without complications        ADDENDUMS:      ____________________________________    Addendum: 09/20/2018 12:03 PM - Katie Kimball         Visit Note Faxed to:        Johnathan Babb  (Cardiology); Number [phone]

## 2021-05-18 NOTE — PROGRESS NOTES
Nicholas Givens 1960     Office/Outpatient Visit    Visit Date: Thu, Apr 25, 2019 01:03 pm    Provider: Buck Aparicio MD (Assistant: Jeri Almazan RN)    Location: [facility]        Electronically signed by Buck Aparicio MD on  04/25/2019 06:08:29 PM                             SUBJECTIVE:        CC: knee pain         HPI:     TJ is in today for evaluation of R knee pain.  He says that he tweaked the knee about 2 weeks ago.  He was walking and the floor kind of rolled on him.  He says that knee moved awkwardly.  He did not have obvious swelling of the knee.  He has been using some ice on the knee.  The pain is along the inner portion of the knee.  It does not radiate.  He has not been taking anything for this.  He denies the knee wanting to give out or lock up.  He does have some stinging in it.  He has not done many steps that he has noted.         Hypertension details; his current cardiac medication regimen includes an angiotensin receptor blocker ( Avapro ).  He is tolerating the medication well without side effects.  Compliance with treatment has been good; he takes his medication as directed and follows up as directed.          With regard to the hyperlipidemia, current treatment includes Zocor and diet.  Compliance with treatment has been fair.  He denies experiencing any hypercholesterolemia related symptoms.      ROS:     CONSTITUTIONAL:  Negative for chills and fever.      CARDIOVASCULAR:  Negative for chest pain and palpitations.      RESPIRATORY:  Negative for recent cough and dyspnea.      GASTROINTESTINAL:  Negative for abdominal pain, nausea and vomiting.          PMH/[facility]/SH:     Last Reviewed on 4/25/2019 01:19 PM by Buck Aparicio    Past Medical History:             PAST MEDICAL HISTORY         Type 2 Diabetes    Hyperlipidemia    Hypertension    Hypothyroidism     Laryngeal Cancer         PREVENTIVE HEALTH MAINTENANCE             COLORECTAL CANCER SCREENING: Up to  date (colonoscopy q10y; sigmoidoscopy q5y; Cologuard q3y) was last done 11/2009, Results are in chart; colonoscopy with normal results; hemorrhoids     EYE EXAM: Diabetic Eye Exam during this calendar year and results are in chart was last done 08/2018         Family History:         Positive for Coronary Artery Disease ( brother ), Hypertension ( father; mother ) and Myocardial Infarction ( father ).      Positive for Asthma ( sister ).      Positive for Type 2 Diabetes ( mother ).          Social History:     Occupation: a construction     Marital Status: Single         Tobacco/Alcohol/Supplements:     Last Reviewed on 4/25/2019 01:19 PM by Buck Aparicio    Tobacco: He has a past history of cigarette smoking; quit date:  10/06.      Caffeine:  He admits to consuming caffeine via tea ( 2 servings per day ).          Substance Abuse History:     Last Reviewed on 4/25/2019 01:19 PM by Buck Aparicio        Marijuana: Current use.          Mental Health History:     Last Reviewed on 4/25/2019 01:19 PM by Buck Aparicio        Communicable Diseases (eg STDs):     Last Reviewed on 4/25/2019 01:19 PM by Buck Aparicio            Current Problems:     Last Reviewed on 4/25/2019 01:19 PM by Buck Aparicio    Acute renal failure, NEC     CAD     Hypertension     Hyperlipidemia     Type 2 diabetes     Laryngeal cancer, NOS     Screening for cancer of colon     History of pulmonary embolism     Tracheostomy status     Peripheral neuropathy attributed to type II diabetes     Screening for prostate cancer         Immunizations:     zzPrevnar-13 8/12/2015     zzFluzone pf-quadrivalent 3 and up 10/27/2015     zzFluzone pf-quadrivalent 3 and up 10/21/2016     zzFluzone pf-quadrivalent 3 and up 10/23/2017     Fluzone (3 + years dose) 12/16/2008     Fluzone (3 + years dose) 12/7/2010     Fluzone pf (3+ years dose) 10/25/2013     Fluzone pf (3+ years dose) 10/14/2014     Fluzone Quadrivalent  (3+ years) 10/12/2018     Influenza A (H1N1), IM (3+ years) Monovalent 12/2/2009     Pneomovax 12/7/2010         Allergies:     Last Reviewed on 4/25/2019 01:19 PM by Buck Aparicio      No Known Drug Allergies.         Current Medications:     Last Reviewed on 4/25/2019 01:19 PM by Buck Aparicio    Irbesartan 150mg Tablet Take 1 tablet(s) by mouth daily     Simvastatin 80mg Tablet Take 1 table by mouth at bedtime     Levemir FlexTouch 100units/1ml Injection Inject 50 U at HS     Glimepiride 4mg Tablet One po bid     Lancet   Lancet Check blood once daily as directed for ONE TOUCH ULTRA  DIAG E11.9     Levothyroxine Sodium 50mcg Capsules 1 p.o. daily     Metformin HCl 1,000mg Tablets, Extended Release Take one PO BID     Victoza 3-Hubert 18mg/3ml Injection Inject 1.2 mg daily     Aspirin (ASA) 325mg Caplet One a day     Hydrochlorothiazide (HCTZ) 12.5mg Tablet 1 po daily     Lumigan 0.01% Ophthalmic Solution 1 drop to each eyes daily     Novolog Flex Pen* 15 ml pen 3-11 units QID per sliding scale         OBJECTIVE:        Vitals:         Current: 4/25/2019 1:08:39 PM    Ht:  6 ft, 0 in;  Wt: 215.2 lbs;  BMI: 29.2    T: 98.3 F (oral);  BP: 131/68 mm Hg (left arm, sitting);  P: 89 bpm (left arm (BP Cuff), sitting);  sCr: 1.34 mg/dL;  GFR: 63.33        Exams:     PHYSICAL EXAM:     GENERAL: Vitals recorded well developed, well nourished;     RESPIRATORY: normal respiratory rate and pattern with no distress; normal breath sounds with no rales, rhonchi, wheezes or rubs;     CARDIOVASCULAR: normal rate; rhythm is regular;  no systolic murmur;     GASTROINTESTINAL: nontender; normal bowel sounds; no masses;     MUSCULOSKELETAL: mildly limited ROM along the medial knee - no effusion is apparent - there is not other acute finding - Teresa testing is negative - patella is normal;         ASSESSMENT           719.46   M25.561  Knee pain              DDx:     401.1   I10  Hypertension              DDx:      272.4   E78.5  Hyperlipidemia              DDx:         ORDERS:         Meds Prescribed:       Refill of: Irbesartan 150mg Tablet Take 1 tablet(s) by mouth daily  #90 (Ninety) tablet(s) Refills: 1         Radiology/Test Orders:       [id number]  Right radiologic examination, knee; three views  (Send-Out)         3017F  Colorectal CA screen results documented and reviewed (PV)  (In-House)         2022F  Dilated retinal eye exam w/interpret by ophthalmologist/optometrist documented/reviewed (DM)4  (In-House)           Other Orders:         Calculated BMI above the upper parameter and a follow-up plan was documented in the medical record  (In-House)                   PLAN:          Knee pain         RADIOLOGY:  I have ordered a right knee x-ray to be done today.      RECOMMENDATIONS given include: His exam is reassuring.  There is not necessarily evidence that would suggest a torn cartilage.  The knee also seems stable.  We will get an X-ray today.  I have recommended he do the following for the knee for now: 1) Rest the knee when needed.  2) Ice the knee for 10 or so minutes at least once daily - not more than twice daily.  3) Compress the knee if comfortable.  4) Elevate the knee when at rest.  We will see how things progress for him.  We will refill needed medications for him otherwise and go from there..  MIPS Vaccines Flu and Pneumonia updated in Shot record    DIABETIC EYE EXAM: Diabetic Eye Exam during this calendar year and results are in chart     COLORECTAL CANCER SCREENING: Results are in chart     BMI Elevated - Follow-Up Plan: He was provided education on weight loss strategies           Orders:       [id number]  Right radiologic examination, knee; three views  (Send-Out)         3017F  Colorectal CA screen results documented and reviewed (PV)  (In-House)           Calculated BMI above the upper parameter and a follow-up plan was documented in the medical record  (In-House)         2022F  Dilated  retinal eye exam w/interpret by ophthalmologist/optometrist documented/reviewed (DM)4  (In-House)             Patient Education Handouts:       Sprains and Strains           Hypertension           Prescriptions:       Refill of: Irbesartan 150mg Tablet Take 1 tablet(s) by mouth daily  #90 (Ninety) tablet(s) Refills: 1          Hyperlipidemia As above.             CHARGE CAPTURE           **Please note: ICD descriptions below are intended for billing purposes only and may not represent clinical diagnoses**        Primary Diagnosis:         719.46 Knee pain            M25.561    Pain in right knee              Orders:          [id number]   Office/outpatient visit; established patient, level 4  (In-House)             3017F   Colorectal CA screen results documented and reviewed (PV)  (In-House)                Calculated BMI above the upper parameter and a follow-up plan was documented in the medical record  (In-House)             2022F   Dilated retinal eye exam w/interpret by ophthalmologist/optometrist documented/reviewed (DM)4  (In-House)           401.1 Hypertension            I10    Essential (primary) hypertension    272.4 Hyperlipidemia            E78.5    Hyperlipidemia, unspecified        ADDENDUMS:      ____________________________________    Addendum: 04/26/2019 08:15 AM - Buck Aparicio also reported some concerns about ongoing nasal congestion.  On exam, nasal polyps are visible.  He asks that we make referral for evaluation on this given his tracheostomy status.  Thanks.

## 2021-07-01 ENCOUNTER — TRANSCRIBE ORDERS (OUTPATIENT)
Dept: ADMINISTRATIVE | Facility: HOSPITAL | Age: 61
End: 2021-07-01

## 2021-07-01 ENCOUNTER — LAB (OUTPATIENT)
Dept: LAB | Facility: HOSPITAL | Age: 61
End: 2021-07-01

## 2021-07-01 VITALS
TEMPERATURE: 98 F | DIASTOLIC BLOOD PRESSURE: 74 MMHG | WEIGHT: 223 LBS | HEART RATE: 100 BPM | SYSTOLIC BLOOD PRESSURE: 154 MMHG | HEIGHT: 72 IN | BODY MASS INDEX: 30.2 KG/M2

## 2021-07-01 VITALS
SYSTOLIC BLOOD PRESSURE: 131 MMHG | DIASTOLIC BLOOD PRESSURE: 69 MMHG | TEMPERATURE: 97.6 F | HEART RATE: 95 BPM | WEIGHT: 207 LBS | HEIGHT: 72 IN | OXYGEN SATURATION: 94 % | BODY MASS INDEX: 28.04 KG/M2

## 2021-07-01 VITALS
SYSTOLIC BLOOD PRESSURE: 163 MMHG | WEIGHT: 214.4 LBS | HEIGHT: 72 IN | HEART RATE: 79 BPM | BODY MASS INDEX: 29.04 KG/M2 | TEMPERATURE: 98.4 F | DIASTOLIC BLOOD PRESSURE: 72 MMHG

## 2021-07-01 VITALS
BODY MASS INDEX: 30.04 KG/M2 | HEIGHT: 72 IN | HEART RATE: 90 BPM | SYSTOLIC BLOOD PRESSURE: 154 MMHG | WEIGHT: 221.8 LBS | DIASTOLIC BLOOD PRESSURE: 70 MMHG | TEMPERATURE: 98.1 F

## 2021-07-01 VITALS
HEIGHT: 72 IN | SYSTOLIC BLOOD PRESSURE: 131 MMHG | DIASTOLIC BLOOD PRESSURE: 68 MMHG | WEIGHT: 215.2 LBS | HEART RATE: 89 BPM | TEMPERATURE: 98.3 F | BODY MASS INDEX: 29.15 KG/M2

## 2021-07-01 DIAGNOSIS — N18.30 MALIGNANT HYPERTENSIVE HEART AND CHRONIC KIDNEY DISEASE STAGE III (HCC): Primary | ICD-10-CM

## 2021-07-01 DIAGNOSIS — E11.9 DIABETES MELLITUS WITHOUT COMPLICATION (HCC): ICD-10-CM

## 2021-07-01 DIAGNOSIS — I51.9 MYXEDEMA HEART DISEASE: ICD-10-CM

## 2021-07-01 DIAGNOSIS — E11.21 DIABETIC GLOMERULOPATHY (HCC): Primary | ICD-10-CM

## 2021-07-01 DIAGNOSIS — N18.30 MALIGNANT HYPERTENSIVE HEART AND CHRONIC KIDNEY DISEASE STAGE III (HCC): ICD-10-CM

## 2021-07-01 DIAGNOSIS — I13.10 MALIGNANT HYPERTENSIVE HEART AND CHRONIC KIDNEY DISEASE STAGE III (HCC): ICD-10-CM

## 2021-07-01 DIAGNOSIS — E03.9 MYXEDEMA HEART DISEASE: ICD-10-CM

## 2021-07-01 DIAGNOSIS — E11.21 DIABETIC GLOMERULOPATHY (HCC): ICD-10-CM

## 2021-07-01 DIAGNOSIS — I13.10 MALIGNANT HYPERTENSIVE HEART AND CHRONIC KIDNEY DISEASE STAGE III (HCC): Primary | ICD-10-CM

## 2021-07-01 DIAGNOSIS — I10 ESSENTIAL HYPERTENSION, MALIGNANT: ICD-10-CM

## 2021-07-01 LAB
ALBUMIN SERPL-MCNC: 4.6 G/DL (ref 3.5–5.2)
ALBUMIN UR-MCNC: 2 MG/DL
ALBUMIN UR-MCNC: 2 MG/DL
ALBUMIN/GLOB SERPL: 1.3 G/DL
ALP SERPL-CCNC: 93 U/L (ref 39–117)
ALT SERPL W P-5'-P-CCNC: 22 U/L (ref 1–41)
ANION GAP SERPL CALCULATED.3IONS-SCNC: 9.5 MMOL/L (ref 5–15)
AST SERPL-CCNC: 25 U/L (ref 1–40)
BASOPHILS # BLD AUTO: 0.01 10*3/MM3 (ref 0–0.2)
BASOPHILS NFR BLD AUTO: 0.1 % (ref 0–1.5)
BILIRUB SERPL-MCNC: 0.3 MG/DL (ref 0–1.2)
BILIRUB UR QL STRIP: NEGATIVE
BUN SERPL-MCNC: 32 MG/DL (ref 8–23)
BUN/CREAT SERPL: 23.5 (ref 7–25)
CALCIUM SPEC-SCNC: 9.7 MG/DL (ref 8.6–10.5)
CHLORIDE SERPL-SCNC: 102 MMOL/L (ref 98–107)
CHOLEST SERPL-MCNC: 122 MG/DL (ref 0–200)
CLARITY UR: CLEAR
CO2 SERPL-SCNC: 28.5 MMOL/L (ref 22–29)
COLOR UR: YELLOW
CREAT SERPL-MCNC: 1.36 MG/DL (ref 0.76–1.27)
CREAT UR-MCNC: 172.7 MG/DL
CREAT UR-MCNC: 197 MG/DL
DEPRECATED RDW RBC AUTO: 43.6 FL (ref 37–54)
EOSINOPHIL # BLD AUTO: 0.16 10*3/MM3 (ref 0–0.4)
EOSINOPHIL NFR BLD AUTO: 1.8 % (ref 0.3–6.2)
ERYTHROCYTE [DISTWIDTH] IN BLOOD BY AUTOMATED COUNT: 13.1 % (ref 12.3–15.4)
GFR SERPL CREATININE-BSD FRML MDRD: 53 ML/MIN/1.73
GLOBULIN UR ELPH-MCNC: 3.5 GM/DL
GLUCOSE SERPL-MCNC: 80 MG/DL (ref 65–99)
GLUCOSE UR STRIP-MCNC: NEGATIVE MG/DL
HBA1C MFR BLD: 6.61 % (ref 4.8–5.6)
HCT VFR BLD AUTO: 49.1 % (ref 37.5–51)
HDLC SERPL-MCNC: 26 MG/DL (ref 40–60)
HGB BLD-MCNC: 16.2 G/DL (ref 13–17.7)
HGB UR QL STRIP.AUTO: NEGATIVE
IMM GRANULOCYTES # BLD AUTO: 0.06 10*3/MM3 (ref 0–0.05)
IMM GRANULOCYTES NFR BLD AUTO: 0.7 % (ref 0–0.5)
KETONES UR QL STRIP: NEGATIVE
LDLC SERPL CALC-MCNC: 74 MG/DL (ref 0–100)
LDLC/HDLC SERPL: 2.76 {RATIO}
LEUKOCYTE ESTERASE UR QL STRIP.AUTO: NEGATIVE
LYMPHOCYTES # BLD AUTO: 1.73 10*3/MM3 (ref 0.7–3.1)
LYMPHOCYTES NFR BLD AUTO: 19.1 % (ref 19.6–45.3)
MCH RBC QN AUTO: 29.8 PG (ref 26.6–33)
MCHC RBC AUTO-ENTMCNC: 33 G/DL (ref 31.5–35.7)
MCV RBC AUTO: 90.3 FL (ref 79–97)
MICROALBUMIN/CREAT UR: 11.6 MG/G
MONOCYTES # BLD AUTO: 0.67 10*3/MM3 (ref 0.1–0.9)
MONOCYTES NFR BLD AUTO: 7.4 % (ref 5–12)
NEUTROPHILS NFR BLD AUTO: 6.44 10*3/MM3 (ref 1.7–7)
NEUTROPHILS NFR BLD AUTO: 70.9 % (ref 42.7–76)
NITRITE UR QL STRIP: NEGATIVE
PH UR STRIP.AUTO: 5.5 [PH] (ref 5–8)
PLATELET # BLD AUTO: 255 10*3/MM3 (ref 140–450)
PMV BLD AUTO: 10 FL (ref 6–12)
POTASSIUM SERPL-SCNC: 4.7 MMOL/L (ref 3.5–5.2)
PROT SERPL-MCNC: 8.1 G/DL (ref 6–8.5)
PROT UR QL STRIP: NEGATIVE
PROT UR-MCNC: 16.6 MG/DL
PROT/CREAT UR: 0.1 MG/G{CREAT}
RBC # BLD AUTO: 5.44 10*6/MM3 (ref 4.14–5.8)
SODIUM SERPL-SCNC: 140 MMOL/L (ref 136–145)
SP GR UR STRIP: >=1.03 (ref 1–1.03)
T4 FREE SERPL-MCNC: 1.17 NG/DL (ref 0.93–1.7)
TRIGL SERPL-MCNC: 121 MG/DL (ref 0–150)
TSH SERPL DL<=0.05 MIU/L-ACNC: 2.81 UIU/ML (ref 0.27–4.2)
UROBILINOGEN UR QL STRIP: NORMAL
VLDLC SERPL-MCNC: 22 MG/DL (ref 5–40)
WBC # BLD AUTO: 9.07 10*3/MM3 (ref 3.4–10.8)

## 2021-07-01 PROCEDURE — 85025 COMPLETE CBC W/AUTO DIFF WBC: CPT

## 2021-07-01 PROCEDURE — 83036 HEMOGLOBIN GLYCOSYLATED A1C: CPT

## 2021-07-01 PROCEDURE — 82043 UR ALBUMIN QUANTITATIVE: CPT

## 2021-07-01 PROCEDURE — 84443 ASSAY THYROID STIM HORMONE: CPT

## 2021-07-01 PROCEDURE — 84156 ASSAY OF PROTEIN URINE: CPT

## 2021-07-01 PROCEDURE — 36415 COLL VENOUS BLD VENIPUNCTURE: CPT

## 2021-07-01 PROCEDURE — 84439 ASSAY OF FREE THYROXINE: CPT

## 2021-07-01 PROCEDURE — 80061 LIPID PANEL: CPT

## 2021-07-01 PROCEDURE — 82570 ASSAY OF URINE CREATININE: CPT

## 2021-07-01 PROCEDURE — 80053 COMPREHEN METABOLIC PANEL: CPT

## 2021-07-01 PROCEDURE — 81003 URINALYSIS AUTO W/O SCOPE: CPT

## 2021-07-02 VITALS
TEMPERATURE: 98.3 F | DIASTOLIC BLOOD PRESSURE: 60 MMHG | HEIGHT: 72 IN | BODY MASS INDEX: 29.69 KG/M2 | SYSTOLIC BLOOD PRESSURE: 113 MMHG | HEART RATE: 95 BPM | WEIGHT: 219.2 LBS

## 2021-07-02 VITALS
BODY MASS INDEX: 28.28 KG/M2 | HEART RATE: 79 BPM | TEMPERATURE: 97.5 F | WEIGHT: 208.8 LBS | DIASTOLIC BLOOD PRESSURE: 68 MMHG | SYSTOLIC BLOOD PRESSURE: 127 MMHG | HEIGHT: 72 IN

## 2021-07-02 VITALS — BODY MASS INDEX: 29.73 KG/M2 | TEMPERATURE: 97.2 F | HEIGHT: 72 IN

## 2021-07-27 ENCOUNTER — OFFICE VISIT (OUTPATIENT)
Dept: FAMILY MEDICINE CLINIC | Age: 61
End: 2021-07-27

## 2021-07-27 VITALS
DIASTOLIC BLOOD PRESSURE: 68 MMHG | WEIGHT: 207 LBS | SYSTOLIC BLOOD PRESSURE: 112 MMHG | BODY MASS INDEX: 28.04 KG/M2 | TEMPERATURE: 98.4 F | HEIGHT: 72 IN | HEART RATE: 100 BPM

## 2021-07-27 DIAGNOSIS — L02.31 CELLULITIS AND ABSCESS OF BUTTOCK: Primary | ICD-10-CM

## 2021-07-27 DIAGNOSIS — L03.317 CELLULITIS AND ABSCESS OF BUTTOCK: Primary | ICD-10-CM

## 2021-07-27 PROCEDURE — 87205 SMEAR GRAM STAIN: CPT | Performed by: FAMILY MEDICINE

## 2021-07-27 PROCEDURE — 87186 SC STD MICRODIL/AGAR DIL: CPT | Performed by: FAMILY MEDICINE

## 2021-07-27 PROCEDURE — 87070 CULTURE OTHR SPECIMN AEROBIC: CPT | Performed by: FAMILY MEDICINE

## 2021-07-27 RX ORDER — HYDROCODONE BITARTRATE AND ACETAMINOPHEN 7.5; 325 MG/1; MG/1
TABLET ORAL
COMMUNITY
End: 2022-08-26

## 2021-07-27 RX ORDER — IRBESARTAN 150 MG/1
150 TABLET ORAL DAILY
COMMUNITY
Start: 2021-07-25 | End: 2021-11-08

## 2021-07-27 RX ORDER — GLIMEPIRIDE 4 MG/1
4 TABLET ORAL 2 TIMES DAILY
COMMUNITY
Start: 2021-05-11

## 2021-07-27 RX ORDER — HYDROCHLOROTHIAZIDE 12.5 MG/1
12.5 TABLET ORAL DAILY
COMMUNITY

## 2021-07-27 RX ORDER — LATANOPROST 50 UG/ML
SOLUTION/ DROPS OPHTHALMIC
COMMUNITY
End: 2022-08-26

## 2021-07-27 RX ORDER — INSULIN ASPART 100 [IU]/ML
INJECTION, SOLUTION INTRAVENOUS; SUBCUTANEOUS
COMMUNITY
Start: 2021-01-05

## 2021-07-27 RX ORDER — INSULIN DETEMIR 100 [IU]/ML
50 INJECTION, SOLUTION SUBCUTANEOUS NIGHTLY
COMMUNITY

## 2021-07-27 RX ORDER — ASPIRIN 325 MG
TABLET ORAL
COMMUNITY

## 2021-07-27 RX ORDER — BIMATOPROST 0.01 %
DROPS OPHTHALMIC (EYE)
COMMUNITY
Start: 2021-01-04 | End: 2022-08-26

## 2021-07-27 RX ORDER — SIMVASTATIN 40 MG
1 TABLET ORAL DAILY
COMMUNITY
End: 2022-05-05 | Stop reason: SDUPTHER

## 2021-07-27 RX ORDER — BLOOD SUGAR DIAGNOSTIC
STRIP MISCELLANEOUS
COMMUNITY
Start: 2021-01-10

## 2021-07-27 RX ORDER — LIRAGLUTIDE 6 MG/ML
1.2 INJECTION SUBCUTANEOUS DAILY
COMMUNITY
Start: 2021-05-16 | End: 2022-01-10

## 2021-07-27 RX ORDER — LEVOTHYROXINE SODIUM 0.05 MG/1
50 TABLET ORAL DAILY
COMMUNITY
Start: 2021-05-23 | End: 2022-08-26

## 2021-07-27 RX ORDER — AMLODIPINE BESYLATE 5 MG/1
5 TABLET ORAL DAILY
COMMUNITY
Start: 2021-04-27

## 2021-07-27 RX ORDER — SULFAMETHOXAZOLE AND TRIMETHOPRIM 800; 160 MG/1; MG/1
1 TABLET ORAL 2 TIMES DAILY
Qty: 20 TABLET | Refills: 0 | Status: SHIPPED | OUTPATIENT
Start: 2021-07-27 | End: 2022-01-10 | Stop reason: SDUPTHER

## 2021-07-27 NOTE — PROGRESS NOTES
"Nicholas Givens presents to [facility].    Chief Complaint:  \"Boil on my butt\"    Subjective       History of Present Illness:  TJ is in today for follow-up on what may be a small abscess.  He is developed this on the left buttocks medially in the last week.  He is not aware of any bite or other injury that might of caused it.  He is not had any recent staph infection or other issue of which she is aware.  He is not allergic to any antibiotics.  He would like to have the area drained if possible.      Review of Systems:  Review of Systems   Constitutional: Negative for chills and fever.   Respiratory: Negative for cough and shortness of breath.    Cardiovascular: Negative for chest pain and palpitations.   Gastrointestinal: Negative for abdominal pain, nausea and vomiting.        Objective   Medical History:  Past Medical History:   • Chronic sinusitis   • Diabetes (CMS/HCC)   • HTN (hypertension)   • Hyperlipemia   • Hypothyroidism   • Knee pain   • Laryngeal cancer (CMS/HCC)   • Nasal obstruction   • Nasal polyposis   • Rhinorrhea   • Tobacco abuse   • Tracheostomy present (CMS/HCC)     Past Surgical History:   • COLONOSCOPY   • CORONARY ANGIOPLASTY WITH STENT PLACEMENT    one stent over 10 years ago   • KNEE SURGERY   • NASAL POLYP SURGERY   • SINUS SURGERY   • TRACHEAL ESOPHAGEAL PUNCTURE      Family History   Problem Relation Age of Onset   • Hypertension Mother    • Diabetes Mother    • Heart disease Father    • Hypertension Father    • Asthma Sister    • Coronary artery disease Brother      Social History     Tobacco Use   • Smoking status: Former Smoker     Quit date:      Years since quittin.5   • Tobacco comment: quit    Substance Use Topics   • Alcohol use: Not on file       Health Maintenance Due   Topic Date Due   • Pneumococcal Vaccine 0-64 (1 of 2 - PPSV23) Never done   • TDAP/TD VACCINES (1 - Tdap) Never done   • ZOSTER VACCINE (1 of 2) Never done   • " HEPATITIS C SCREENING  Never done   • ANNUAL WELLNESS VISIT  Never done   • DIABETIC FOOT EXAM  Never done        Immunization History   Administered Date(s) Administered   • COVID-19 (MODERNA) 03/30/2021, 04/27/2021   • Influenza Quad Vaccine (Inpatient) 10/25/2013       No Known Allergies     Medications:  Current Outpatient Medications on File Prior to Visit   Medication Sig   • amLODIPine (NORVASC) 5 MG tablet amlodipine 5 mg tablet   • aspirin 325 MG tablet aspirin 325 mg oral tablet take 1 tablet (325 mg) by oral route once daily   Active   • bimatoprost (Lumigan) 0.01 % ophthalmic drops Lumigan 0.01 % eye drops   • glimepiride (AMARYL) 4 MG tablet Take 4 mg by mouth 2 (two) times a day.   • glucose blood (OneTouch Ultra) test strip USE 1 STRIP TO CHECK GLUCOSE 4 TIMES DAILY   • hydroCHLOROthiazide (HYDRODIURIL) 12.5 MG tablet hydrochlorothiazide 12.5 mg tablet   • insulin aspart (NovoLOG FlexPen) 100 UNIT/ML solution pen-injector sc pen Novolog Flexpen U-100 Insulin aspart 100 unit/mL (3 mL) subcutaneous   • insulin detemir (Levemir FlexTouch) 100 UNIT/ML injection Inject 50 Units under the skin into the appropriate area as directed Every Night.   • Insulin Pen Needle (NovoFine Plus) 32G X 4 MM misc USE 1 PEN NEEDLE TO INJECT VICTOZA UNDER THE SKIN ONCE DAILY   • irbesartan (AVAPRO) 150 MG tablet Take 150 mg by mouth Daily.   • levothyroxine (SYNTHROID, LEVOTHROID) 50 MCG tablet Take 50 mcg by mouth Daily.   • metFORMIN (GLUCOPHAGE) 500 MG tablet TAKE 1 TABLET BY MOUTH IN THE MORNING WITH BREAKFAST AND 2 TABLETS WITH SUPPER   • simvastatin (ZOCOR) 40 MG tablet Take 1 tablet by mouth Daily.   • Victoza 18 MG/3ML solution pen-injector injection Inject 1.2 mg under the skin into the appropriate area as directed Daily.   • HYDROcodone-acetaminophen (NORCO) 7.5-325 MG per tablet hydrocodone 7.5 mg-acetaminophen 325 mg tablet   • latanoprost (XALATAN) 0.005 % ophthalmic solution latanoprost 0.005 % eye drops     No  "current facility-administered medications on file prior to visit.       Vital Signs:   /68 (BP Location: Right arm, Patient Position: Sitting)   Pulse 100   Temp 98.4 °F (36.9 °C) (Oral)   Ht 182.9 cm (72.01\")   Wt 93.9 kg (207 lb)   BMI 28.07 kg/m²       Physical Exam:  Physical Exam  Vitals reviewed.   Constitutional:       General: He is not in acute distress.     Appearance: He is not ill-appearing.   Eyes:      Pupils: Pupils are equal, round, and reactive to light.   Neck:      Comments: No thyromegaly  Cardiovascular:      Rate and Rhythm: Normal rate and regular rhythm.   Pulmonary:      Effort: Pulmonary effort is normal.      Breath sounds: Normal breath sounds.   Abdominal:      General: There is no distension.      Palpations: Abdomen is soft.      Tenderness: There is no abdominal tenderness.   Musculoskeletal:      Cervical back: Neck supple.   Lymphadenopathy:      Cervical: No cervical adenopathy.   Skin:     Findings: No rash.      Comments: There appears to be a small area of cellulitis and possible abscess in the left buttocks medially.  No obvious drainage is present.   Neurological:      Mental Status: He is alert.         Result Review      The following data was reviewed by Buck Aparicio MD on 07/27/2021.  Lab Results   Component Value Date    WBC 9.07 07/01/2021    HGB 16.2 07/01/2021    HCT 49.1 07/01/2021    MCV 90.3 07/01/2021     07/01/2021     Lab Results   Component Value Date    GLUCOSE 80 07/01/2021    BUN 32 (H) 07/01/2021    CREATININE 1.36 (H) 07/01/2021    EGFRIFNONA 53 (L) 07/01/2021    BCR 23.5 07/01/2021    K 4.7 07/01/2021    CO2 28.5 07/01/2021    CALCIUM 9.7 07/01/2021    ALBUMIN 4.60 07/01/2021    LABIL2 1.3 (L) 03/08/2021    AST 25 07/01/2021    ALT 22 07/01/2021     Lab Results   Component Value Date    CHOL 122 07/01/2021    CHLPL 116 03/08/2021    TRIG 121 07/01/2021    HDL 26 (L) 07/01/2021    LDL 74 07/01/2021     Lab Results   Component " Value Date    TSH 2.810 07/01/2021     Lab Results   Component Value Date    HGBA1C 6.61 (H) 07/01/2021     Lab Results   Component Value Date    PSA 1.05 04/29/2020     Incision & Drainage    Date/Time: 7/27/2021 3:00 PM  Performed by: Buck Aparicio MD  Authorized by: Buck Aparicio MD   Type: abscess  Location: Buttocks.  Anesthesia: local infiltration    Anesthesia:  Local Anesthetic: lidocaine 1% with epinephrine    Sedation:  Patient sedated: no    Scalpel size: 11  Incision type: single straight  Drainage characteristics: no drainage was expressed.  Drainage amount: scant  Wound treatment: wound left open  Packing material: none  Patient tolerance: patient tolerated the procedure well with no immediate complications           Assessment and Plan:   Today, we have reviewed his care.  We did not get drainage from the site.  We will go ahead and place him on antibiotics as noted below while culture is pending.  I have recommended routine care on his part including keeping this covered and soaking in some warm water with Epsom salt for about 10 minutes once daily.  We will see how things progress.  If this does not improve where we did the incision, then we will want to have him see surgery as a precaution.  I went about as deep as I felt comfortable going.       Diagnoses and all orders for this visit:    1. Cellulitis and abscess of buttock (Primary)    Other orders  -     Incision & Drainage          Follow Up   Return if symptoms worsen or fail to improve.  Patient was given instructions and counseling regarding his condition or for health maintenance advice. Please see specific information pulled into the AVS if appropriate.

## 2021-07-27 NOTE — PATIENT INSTRUCTIONS
Skin Abscess    A skin abscess is an infected area on or under your skin that contains a collection of pus and other material. An abscess may also be called a furuncle, carbuncle, or boil. An abscess can occur in or on almost any part of your body.  Some abscesses break open (rupture) on their own. Most continue to get worse unless they are treated. The infection can spread deeper into the body and eventually into your blood, which can make you feel ill. Treatment usually involves draining the abscess.  What are the causes?  An abscess occurs when germs, like bacteria, pass through your skin and cause an infection. This may be caused by:  · A scrape or cut on your skin.  · A puncture wound through your skin, including a needle injection or insect bite.  · Blocked oil or sweat glands.  · Blocked and infected hair follicles.  · A cyst that forms beneath your skin (sebaceous cyst) and becomes infected.  What increases the risk?  This condition is more likely to develop in people who:  · Have a weak body defense system (immune system).  · Have diabetes.  · Have dry and irritated skin.  · Get frequent injections or use illegal IV drugs.  · Have a foreign body in a wound, such as a splinter.  · Have problems with their lymph system or veins.  What are the signs or symptoms?  Symptoms of this condition include:  · A painful, firm bump under the skin.  · A bump with pus at the top. This may break through the skin and drain.  Other symptoms include:  · Redness surrounding the abscess site.  · Warmth.  · Swelling of the lymph nodes (glands) near the abscess.  · Tenderness.  · A sore on the skin.  How is this diagnosed?  This condition may be diagnosed based on:  · A physical exam.  · Your medical history.  · A sample of pus. This may be used to find out what is causing the infection.  · Blood tests.  · Imaging tests, such as an ultrasound, CT scan, or MRI.  How is this treated?  A small abscess that drains on its own may not  need treatment. Treatment for larger abscesses may include:  · Moist heat or heat pack applied to the area several times a day.  · A procedure to drain the abscess (incision and drainage).  · Antibiotic medicines. For a severe abscess, you may first get antibiotics through an IV and then change to antibiotics by mouth.  Follow these instructions at home:  Medicines    · Take over-the-counter and prescription medicines only as told by your health care provider.  · If you were prescribed an antibiotic medicine, take it as told by your health care provider. Do not stop taking the antibiotic even if you start to feel better.  Abscess care    · If you have an abscess that has not drained, apply heat to the affected area. Use the heat source that your health care provider recommends, such as a moist heat pack or a heating pad.  ? Place a towel between your skin and the heat source.  ? Leave the heat on for 20-30 minutes.  ? Remove the heat if your skin turns bright red. This is especially important if you are unable to feel pain, heat, or cold. You may have a greater risk of getting burned.  · Follow instructions from your health care provider about how to take care of your abscess. Make sure you:  ? Cover the abscess with a bandage (dressing).  ? Change your dressing or gauze as told by your health care provider.  ? Wash your hands with soap and water before you change the dressing or gauze. If soap and water are not available, use hand .  · Check your abscess every day for signs of a worsening infection. Check for:  ? More redness, swelling, or pain.  ? More fluid or blood.  ? Warmth.  ? More pus or a bad smell.  General instructions  · To avoid spreading the infection:  ? Do not share personal care items, towels, or hot tubs with others.  ? Avoid making skin contact with other people.  · Keep all follow-up visits as told by your health care provider. This is important.  Contact a health care provider if you  have:  · More redness, swelling, or pain around your abscess.  · More fluid or blood coming from your abscess.  · Warm skin around your abscess.  · More pus or a bad smell coming from your abscess.  · A fever.  · Muscle aches.  · Chills or a general ill feeling.  Get help right away if you:  · Have severe pain.  · See red streaks on your skin spreading away from the abscess.  Summary  · A skin abscess is an infected area on or under your skin that contains a collection of pus and other material.  · A small abscess that drains on its own may not need treatment.  · Treatment for larger abscesses may include having a procedure to drain the abscess and taking an antibiotic.  This information is not intended to replace advice given to you by your health care provider. Make sure you discuss any questions you have with your health care provider.  Document Revised: 04/09/2020 Document Reviewed: 01/31/2019  [employer] Patient Education © 2021 [employer]

## 2021-07-29 LAB
BACTERIA SPEC AEROBE CULT: ABNORMAL
GRAM STN SPEC: ABNORMAL
GRAM STN SPEC: ABNORMAL

## 2021-10-18 ENCOUNTER — CLINICAL SUPPORT (OUTPATIENT)
Dept: FAMILY MEDICINE CLINIC | Age: 61
End: 2021-10-18

## 2021-10-18 DIAGNOSIS — Z23 NEED FOR INFLUENZA VACCINATION: Primary | ICD-10-CM

## 2021-10-18 PROCEDURE — 90686 IIV4 VACC NO PRSV 0.5 ML IM: CPT | Performed by: FAMILY MEDICINE

## 2021-10-18 PROCEDURE — G0008 ADMIN INFLUENZA VIRUS VAC: HCPCS | Performed by: FAMILY MEDICINE

## 2021-11-03 ENCOUNTER — LAB (OUTPATIENT)
Dept: LAB | Facility: HOSPITAL | Age: 61
End: 2021-11-03

## 2021-11-03 ENCOUNTER — TRANSCRIBE ORDERS (OUTPATIENT)
Dept: ADMINISTRATIVE | Facility: HOSPITAL | Age: 61
End: 2021-11-03

## 2021-11-03 DIAGNOSIS — E11.9 DIABETES MELLITUS WITHOUT COMPLICATION (HCC): Primary | ICD-10-CM

## 2021-11-03 DIAGNOSIS — Z79.4 ENCOUNTER FOR LONG-TERM (CURRENT) USE OF INSULIN (HCC): ICD-10-CM

## 2021-11-03 DIAGNOSIS — E11.9 DIABETES MELLITUS WITHOUT COMPLICATION (HCC): ICD-10-CM

## 2021-11-03 LAB
ALBUMIN SERPL-MCNC: 4.2 G/DL (ref 3.5–5.2)
ALBUMIN/GLOB SERPL: 1.3 G/DL
ALP SERPL-CCNC: 101 U/L (ref 39–117)
ALT SERPL W P-5'-P-CCNC: 28 U/L (ref 1–41)
ANION GAP SERPL CALCULATED.3IONS-SCNC: 5.9 MMOL/L (ref 5–15)
AST SERPL-CCNC: 26 U/L (ref 1–40)
BILIRUB SERPL-MCNC: 0.2 MG/DL (ref 0–1.2)
BUN SERPL-MCNC: 22 MG/DL (ref 8–23)
BUN/CREAT SERPL: 18 (ref 7–25)
CALCIUM SPEC-SCNC: 9.3 MG/DL (ref 8.6–10.5)
CHLORIDE SERPL-SCNC: 103 MMOL/L (ref 98–107)
CO2 SERPL-SCNC: 31.1 MMOL/L (ref 22–29)
CREAT SERPL-MCNC: 1.22 MG/DL (ref 0.76–1.27)
FOLATE SERPL-MCNC: 15.9 NG/ML (ref 4.78–24.2)
GFR SERPL CREATININE-BSD FRML MDRD: 60 ML/MIN/1.73
GLOBULIN UR ELPH-MCNC: 3.3 GM/DL
GLUCOSE SERPL-MCNC: 208 MG/DL (ref 65–99)
HBA1C MFR BLD: 6.92 % (ref 4.8–5.6)
POTASSIUM SERPL-SCNC: 4.7 MMOL/L (ref 3.5–5.2)
PROT SERPL-MCNC: 7.5 G/DL (ref 6–8.5)
SODIUM SERPL-SCNC: 140 MMOL/L (ref 136–145)
VIT B12 BLD-MCNC: 491 PG/ML (ref 211–946)

## 2021-11-03 PROCEDURE — 36415 COLL VENOUS BLD VENIPUNCTURE: CPT

## 2021-11-03 PROCEDURE — 83036 HEMOGLOBIN GLYCOSYLATED A1C: CPT

## 2021-11-03 PROCEDURE — 80053 COMPREHEN METABOLIC PANEL: CPT

## 2021-11-03 PROCEDURE — 82746 ASSAY OF FOLIC ACID SERUM: CPT

## 2021-11-03 PROCEDURE — 82607 VITAMIN B-12: CPT

## 2021-11-08 RX ORDER — IRBESARTAN 150 MG/1
TABLET ORAL
Qty: 90 TABLET | Refills: 0 | Status: SHIPPED | OUTPATIENT
Start: 2021-11-08 | End: 2022-02-08

## 2022-01-10 ENCOUNTER — HOSPITAL ENCOUNTER (OUTPATIENT)
Dept: GENERAL RADIOLOGY | Facility: HOSPITAL | Age: 62
Discharge: HOME | End: 2022-01-10
Admitting: FAMILY MEDICINE

## 2022-01-10 ENCOUNTER — OFFICE VISIT (OUTPATIENT)
Dept: FAMILY MEDICINE CLINIC | Age: 62
End: 2022-01-10

## 2022-01-10 VITALS
HEIGHT: 72 IN | WEIGHT: 215.2 LBS | HEART RATE: 96 BPM | TEMPERATURE: 98.3 F | SYSTOLIC BLOOD PRESSURE: 137 MMHG | DIASTOLIC BLOOD PRESSURE: 81 MMHG | BODY MASS INDEX: 29.15 KG/M2

## 2022-01-10 DIAGNOSIS — S61.222A LACERATION OF RIGHT MIDDLE FINGER WITH FOREIGN BODY WITHOUT DAMAGE TO NAIL, INITIAL ENCOUNTER: ICD-10-CM

## 2022-01-10 DIAGNOSIS — L03.011 CELLULITIS OF FINGER OF RIGHT HAND: ICD-10-CM

## 2022-01-10 DIAGNOSIS — S61.222A LACERATION OF RIGHT MIDDLE FINGER WITH FOREIGN BODY WITHOUT DAMAGE TO NAIL, INITIAL ENCOUNTER: Primary | ICD-10-CM

## 2022-01-10 DIAGNOSIS — Z23 ENCOUNTER FOR IMMUNIZATION: ICD-10-CM

## 2022-01-10 PROCEDURE — 73140 X-RAY EXAM OF FINGER(S): CPT

## 2022-01-10 PROCEDURE — 90715 TDAP VACCINE 7 YRS/> IM: CPT | Performed by: FAMILY MEDICINE

## 2022-01-10 PROCEDURE — 87070 CULTURE OTHR SPECIMN AEROBIC: CPT | Performed by: FAMILY MEDICINE

## 2022-01-10 PROCEDURE — 90471 IMMUNIZATION ADMIN: CPT | Performed by: FAMILY MEDICINE

## 2022-01-10 PROCEDURE — 99213 OFFICE O/P EST LOW 20 MIN: CPT | Performed by: FAMILY MEDICINE

## 2022-01-10 PROCEDURE — 87147 CULTURE TYPE IMMUNOLOGIC: CPT | Performed by: FAMILY MEDICINE

## 2022-01-10 PROCEDURE — 87205 SMEAR GRAM STAIN: CPT | Performed by: FAMILY MEDICINE

## 2022-01-10 PROCEDURE — 87186 SC STD MICRODIL/AGAR DIL: CPT | Performed by: FAMILY MEDICINE

## 2022-01-10 RX ORDER — SEMAGLUTIDE 1.34 MG/ML
0.5 INJECTION, SOLUTION SUBCUTANEOUS WEEKLY
COMMUNITY
Start: 2021-11-11

## 2022-01-10 RX ORDER — AMOXICILLIN AND CLAVULANATE POTASSIUM 875; 125 MG/1; MG/1
1 TABLET, FILM COATED ORAL 2 TIMES DAILY
Qty: 20 TABLET | Refills: 0 | Status: SHIPPED | OUTPATIENT
Start: 2022-01-10 | End: 2022-08-26

## 2022-01-10 NOTE — PROGRESS NOTES
Nicholas Givens presents to [facility].    Chief Complaint:  Laceration, cellulitis, possible foreign body    Subjective       History of Present Illness:  ARIEL PALENCIA  Was grabbing some wood a couple of weeks ago and had a splinter get into the right middle finger.  He had a laceration as well.  He thinks he got the main part of the splinter out, but he is continue to have a little bit of an open wound and some drainage.  He has some associated pain as well.  He want to have this evaluated.      Review of Systems:  Review of Systems   Constitutional: Negative for chills and fever.   Respiratory: Negative for cough and shortness of breath.    Cardiovascular: Negative for chest pain and palpitations.   Gastrointestinal: Negative for abdominal pain, nausea and vomiting.        Objective   Medical History:  Past Medical History:   • Chronic sinusitis   • Diabetes (HCC)   • HTN (hypertension)   • Hyperlipemia   • Hypothyroidism   • Knee pain   • Laryngeal cancer (HCC)   • Nasal obstruction   • Nasal polyposis   • Rhinorrhea   • Tobacco abuse   • Tracheostomy present (HCC)     Past Surgical History:   • COLONOSCOPY   • CORONARY ANGIOPLASTY WITH STENT PLACEMENT    one stent over 10 years ago   • KNEE SURGERY   • NASAL POLYP SURGERY   • SINUS SURGERY   • TRACHEAL ESOPHAGEAL PUNCTURE      Family History   Problem Relation Age of Onset   • Hypertension Mother    • Diabetes Mother    • Heart disease Father    • Hypertension Father    • Asthma Sister    • Coronary artery disease Brother      Social History     Tobacco Use   • Smoking status: Former Smoker     Quit date: 2007     Years since quitting: 15.0   • Smokeless tobacco: Never Used   • Tobacco comment: quit 2007   Substance Use Topics   • Alcohol use: Not on file       Health Maintenance Due   Topic Date Due   • Pneumococcal Vaccine 0-64 (1 of 2 - PPSV23) Never done   • TDAP/TD VACCINES (1 - Tdap) Never done   • ZOSTER VACCINE (1 of 2) Never  done   • HEPATITIS C SCREENING  Never done   • ANNUAL WELLNESS VISIT  Never done   • DIABETIC FOOT EXAM  Never done   • DIABETIC EYE EXAM  10/15/2021        Immunization History   Administered Date(s) Administered   • COVID-19 (MODERNA) 1st, 2nd, 3rd Dose Only 03/30/2021, 04/27/2021, 11/10/2021   • FluLaval/Fluarix/Fluzone >6 10/18/2021   • Influenza Quad Vaccine (Inpatient) 10/25/2013       No Known Allergies     Medications:  Current Outpatient Medications on File Prior to Visit   Medication Sig   • amLODIPine (NORVASC) 5 MG tablet Take 5 mg by mouth Daily.   • aspirin 325 MG tablet aspirin 325 mg oral tablet take 1 tablet (325 mg) by oral route once daily   Active   • glimepiride (AMARYL) 4 MG tablet Take 4 mg by mouth 2 (two) times a day.   • glucose blood (OneTouch Ultra) test strip USE 1 STRIP TO CHECK GLUCOSE 4 TIMES DAILY   • hydroCHLOROthiazide (HYDRODIURIL) 12.5 MG tablet Take 12.5 mg by mouth Daily.   • HYDROcodone-acetaminophen (NORCO) 7.5-325 MG per tablet hydrocodone 7.5 mg-acetaminophen 325 mg tablet   • insulin aspart (NovoLOG FlexPen) 100 UNIT/ML solution pen-injector sc pen Sliding scale   • insulin detemir (Levemir FlexTouch) 100 UNIT/ML injection Inject 50 Units under the skin into the appropriate area as directed Every Night.   • Insulin Pen Needle (NovoFine Plus) 32G X 4 MM misc USE 1 PEN NEEDLE TO INJECT VICTOZA UNDER THE SKIN ONCE DAILY   • irbesartan (AVAPRO) 150 MG tablet Take 1 tablet by mouth once daily   • levothyroxine (SYNTHROID, LEVOTHROID) 50 MCG tablet Take 50 mcg by mouth Daily.   • metFORMIN (GLUCOPHAGE) 500 MG tablet TAKE 1 TABLET BY MOUTH IN THE MORNING WITH BREAKFAST AND 2 TABLETS WITH SUPPER   • Ozempic, 0.25 or 0.5 MG/DOSE, 2 MG/1.5ML solution pen-injector 0.5 mg 1 (One) Time Per Week.   • simvastatin (ZOCOR) 40 MG tablet Take 1 tablet by mouth Daily.   • bimatoprost (Lumigan) 0.01 % ophthalmic drops Lumigan 0.01 % eye drops   • latanoprost (XALATAN) 0.005 % ophthalmic  "solution latanoprost 0.005 % eye drops   • Victoza 18 MG/3ML solution pen-injector injection Inject 1.2 mg under the skin into the appropriate area as directed Daily.   • [DISCONTINUED] sulfamethoxazole-trimethoprim (Bactrim DS) 800-160 MG per tablet Take 1 tablet by mouth 2 (Two) Times a Day.     No current facility-administered medications on file prior to visit.       Vital Signs:   /81 (BP Location: Right arm, Patient Position: Sitting)   Pulse 96   Temp 98.3 °F (36.8 °C) (Oral)   Ht 182.9 cm (72.01\")   Wt 97.6 kg (215 lb 3.2 oz)   BMI 29.18 kg/m²       Physical Exam:  Physical Exam  Vitals reviewed.   Constitutional:       General: He is not in acute distress.     Appearance: He is not ill-appearing.   Eyes:      Pupils: Pupils are equal, round, and reactive to light.   Neck:      Comments: No thyromegaly  Cardiovascular:      Rate and Rhythm: Normal rate and regular rhythm.   Pulmonary:      Effort: Pulmonary effort is normal.      Breath sounds: Normal breath sounds.   Abdominal:      General: There is no distension.      Palpations: Abdomen is soft.      Tenderness: There is no abdominal tenderness.   Musculoskeletal:      Cervical back: Neck supple.   Lymphadenopathy:      Cervical: No cervical adenopathy.   Skin:     Findings: No lesion or rash.      Comments: There is a laceration of the right middle finger.  This is on the palmar aspect of the finger just distal to the PIP joint.  There is some discoloration due to presumed infection.  Mild redness is noted.  Some mild drainage is present.  No visible foreign body is apparent.   Neurological:      Mental Status: He is alert.         Result Review      The following data was reviewed by Buck Aparicio MD on 01/10/2022.  Lab Results   Component Value Date    WBC 9.07 07/01/2021    HGB 16.2 07/01/2021    HCT 49.1 07/01/2021    MCV 90.3 07/01/2021     07/01/2021     Lab Results   Component Value Date    GLUCOSE 208 (H) 11/03/2021    " BUN 22 11/03/2021    CREATININE 1.22 11/03/2021     11/03/2021    K 4.7 11/03/2021     11/03/2021    CO2 31.1 (H) 11/03/2021    CALCIUM 9.3 11/03/2021    PROTEINTOT 7.5 11/03/2021    ALBUMIN 4.20 11/03/2021    ALT 28 11/03/2021    AST 26 11/03/2021    ALKPHOS 101 11/03/2021    BILITOT 0.2 11/03/2021    EGFRIFNONA 60 (L) 11/03/2021    GLOB 3.3 11/03/2021    AGRATIO 1.3 11/03/2021    BCR 18.0 11/03/2021    ANIONGAP 5.9 11/03/2021      Lab Results   Component Value Date    CHOL 122 07/01/2021    CHLPL 116 03/08/2021    TRIG 121 07/01/2021    HDL 26 (L) 07/01/2021    LDL 74 07/01/2021     Lab Results   Component Value Date    TSH 2.810 07/01/2021     Lab Results   Component Value Date    HGBA1C 6.92 (H) 11/03/2021     Lab Results   Component Value Date    PSA 1.05 04/29/2020            Assessment and Plan:   Today, we have reviewed his care.  We will go ahead and evaluate this further as noted below.  Also, because of the possibility of a persisting foreign body, we will make referral to hand surgery for their evaluation.  No other short-term changes anticipated.    {CC Problem List  Visit Diagnosis  ROS  Review (Popup)  [facility] Maintenance  Quality  BestPractice  Medications  SmartSets  SnapShot Encounters  Media :23}   Diagnoses and all orders for this visit:    1. Laceration of right middle finger with foreign body without damage to nail, initial encounter (Primary)  -     Ambulatory Referral to Hand Surgery  -     XR Finger 2+ View Right; Future  -     Tdap Vaccine Greater Than or Equal To 6yo IM    2. Cellulitis of finger of right hand  -     Wound Culture - Wound, Finger; Future  -     amoxicillin-clavulanate (Augmentin) 875-125 MG per tablet; Take 1 tablet by mouth 2 (Two) Times a Day.  Dispense: 20 tablet; Refill: 0  -     Ambulatory Referral to Hand Surgery  -     XR Finger 2+ View Right; Future  -     Wound Culture - Wound, Finger    3. Encounter for immunization  -     Tdap Vaccine  Greater Than or Equal To 8yo IM          Follow Up   Return if symptoms worsen or fail to improve.  Patient was given instructions and counseling regarding his condition or for health maintenance advice. Please see specific information pulled into the AVS if appropriate.

## 2022-01-13 LAB
BACTERIA SPEC AEROBE CULT: ABNORMAL
BACTERIA SPEC AEROBE CULT: ABNORMAL
GRAM STN SPEC: ABNORMAL

## 2022-02-08 RX ORDER — IRBESARTAN 150 MG/1
TABLET ORAL
Qty: 90 TABLET | Refills: 0 | Status: SHIPPED | OUTPATIENT
Start: 2022-02-08 | End: 2022-05-04

## 2022-03-08 ENCOUNTER — LAB (OUTPATIENT)
Dept: LAB | Facility: HOSPITAL | Age: 62
End: 2022-03-08

## 2022-03-08 ENCOUNTER — TRANSCRIBE ORDERS (OUTPATIENT)
Dept: ADMINISTRATIVE | Facility: HOSPITAL | Age: 62
End: 2022-03-08

## 2022-03-08 DIAGNOSIS — Z79.84 LONG TERM CURRENT USE OF ORAL HYPOGLYCEMIC DRUG: ICD-10-CM

## 2022-03-08 DIAGNOSIS — E03.9 HYPOTHYROIDISM, UNSPECIFIED TYPE: ICD-10-CM

## 2022-03-08 DIAGNOSIS — I10 ESSENTIAL HYPERTENSION: ICD-10-CM

## 2022-03-08 DIAGNOSIS — Z79.4 INSULIN LONG-TERM USE: ICD-10-CM

## 2022-03-08 DIAGNOSIS — E78.5 HYPERLIPIDEMIA, UNSPECIFIED HYPERLIPIDEMIA TYPE: ICD-10-CM

## 2022-03-08 DIAGNOSIS — E66.3 OVER WEIGHT: ICD-10-CM

## 2022-03-08 DIAGNOSIS — E11.9 DIABETES MELLITUS WITHOUT COMPLICATION: ICD-10-CM

## 2022-03-08 DIAGNOSIS — E11.9 DIABETES MELLITUS WITHOUT COMPLICATION: Primary | ICD-10-CM

## 2022-03-08 LAB
ALBUMIN SERPL-MCNC: 4.3 G/DL (ref 3.5–5.2)
ALBUMIN UR-MCNC: 1.5 MG/DL
ALBUMIN/GLOB SERPL: 1.3 G/DL
ALP SERPL-CCNC: 103 U/L (ref 39–117)
ALT SERPL W P-5'-P-CCNC: 21 U/L (ref 1–41)
ANION GAP SERPL CALCULATED.3IONS-SCNC: 10 MMOL/L (ref 5–15)
AST SERPL-CCNC: 18 U/L (ref 1–40)
BILIRUB SERPL-MCNC: 0.2 MG/DL (ref 0–1.2)
BUN SERPL-MCNC: 23 MG/DL (ref 8–23)
BUN/CREAT SERPL: 19.7 (ref 7–25)
CALCIUM SPEC-SCNC: 9.2 MG/DL (ref 8.6–10.5)
CHLORIDE SERPL-SCNC: 105 MMOL/L (ref 98–107)
CHOLEST SERPL-MCNC: 130 MG/DL (ref 0–200)
CO2 SERPL-SCNC: 26 MMOL/L (ref 22–29)
CREAT SERPL-MCNC: 1.17 MG/DL (ref 0.76–1.27)
CREAT UR-MCNC: 230.3 MG/DL
EGFRCR SERPLBLD CKD-EPI 2021: 70.5 ML/MIN/1.73
GLOBULIN UR ELPH-MCNC: 3.3 GM/DL
GLUCOSE SERPL-MCNC: 150 MG/DL (ref 65–99)
HBA1C MFR BLD: 7.5 % (ref 4.8–5.6)
HDLC SERPL-MCNC: 27 MG/DL (ref 40–60)
LDLC SERPL CALC-MCNC: 74 MG/DL (ref 0–100)
LDLC/HDLC SERPL: 2.57 {RATIO}
MICROALBUMIN/CREAT UR: 6.5 MG/G
POTASSIUM SERPL-SCNC: 4.2 MMOL/L (ref 3.5–5.2)
PROT SERPL-MCNC: 7.6 G/DL (ref 6–8.5)
SODIUM SERPL-SCNC: 141 MMOL/L (ref 136–145)
TRIGL SERPL-MCNC: 168 MG/DL (ref 0–150)
TSH SERPL DL<=0.05 MIU/L-ACNC: 3.84 UIU/ML (ref 0.27–4.2)
VLDLC SERPL-MCNC: 29 MG/DL (ref 5–40)

## 2022-03-08 PROCEDURE — 82043 UR ALBUMIN QUANTITATIVE: CPT

## 2022-03-08 PROCEDURE — 82570 ASSAY OF URINE CREATININE: CPT

## 2022-03-08 PROCEDURE — 80053 COMPREHEN METABOLIC PANEL: CPT

## 2022-03-08 PROCEDURE — 36415 COLL VENOUS BLD VENIPUNCTURE: CPT

## 2022-03-08 PROCEDURE — 80061 LIPID PANEL: CPT

## 2022-03-08 PROCEDURE — 83036 HEMOGLOBIN GLYCOSYLATED A1C: CPT

## 2022-03-08 PROCEDURE — 84443 ASSAY THYROID STIM HORMONE: CPT

## 2022-05-04 RX ORDER — IRBESARTAN 150 MG/1
TABLET ORAL
Qty: 90 TABLET | Refills: 0 | Status: SHIPPED | OUTPATIENT
Start: 2022-05-04 | End: 2022-08-03 | Stop reason: SDUPTHER

## 2022-05-05 ENCOUNTER — TELEPHONE (OUTPATIENT)
Dept: FAMILY MEDICINE CLINIC | Age: 62
End: 2022-05-05

## 2022-05-05 RX ORDER — SIMVASTATIN 40 MG
40 TABLET ORAL DAILY
Qty: 90 TABLET | Refills: 0 | Status: SHIPPED | OUTPATIENT
Start: 2022-05-05 | End: 2022-08-03

## 2022-05-05 NOTE — TELEPHONE ENCOUNTER
Caller: Nicholas Givens    Relationship: Self    Best call back number: [phone]    Requested Prescriptions:   Requested Prescriptions     Pending Prescriptions Disp Refills   • simvastatin (ZOCOR) 40 MG tablet       Sig: Take 1 tablet by mouth Daily.        Pharmacy where request should be sent: [facility] [address] [place] [address] [place] [facility] - [phone]  - [phone] FX     Additional details provided by patient: PATIENT WILL BE OUT OF MEDICATION AFTER TODAY.    Does the patient have less than a 3 day supply:  [x] Yes  [] No    Jori Gonzalez Rep   05/05/22 09:09 EDT

## 2022-07-20 ENCOUNTER — LAB (OUTPATIENT)
Dept: LAB | Facility: HOSPITAL | Age: 62
End: 2022-07-20

## 2022-07-20 ENCOUNTER — TRANSCRIBE ORDERS (OUTPATIENT)
Dept: ADMINISTRATIVE | Facility: HOSPITAL | Age: 62
End: 2022-07-20

## 2022-07-20 DIAGNOSIS — I10 ESSENTIAL HYPERTENSION, MALIGNANT: ICD-10-CM

## 2022-07-20 DIAGNOSIS — E11.9 DIABETES MELLITUS WITHOUT COMPLICATION: Primary | ICD-10-CM

## 2022-07-20 DIAGNOSIS — E11.9 DIABETES MELLITUS WITHOUT COMPLICATION: ICD-10-CM

## 2022-07-20 LAB
ALBUMIN SERPL-MCNC: 4 G/DL (ref 3.5–5.2)
ALBUMIN/GLOB SERPL: 1.1 G/DL
ALP SERPL-CCNC: 87 U/L (ref 39–117)
ALT SERPL W P-5'-P-CCNC: 20 U/L (ref 1–41)
ANION GAP SERPL CALCULATED.3IONS-SCNC: 13.1 MMOL/L (ref 5–15)
AST SERPL-CCNC: 19 U/L (ref 1–40)
BILIRUB SERPL-MCNC: 0.2 MG/DL (ref 0–1.2)
BUN SERPL-MCNC: 26 MG/DL (ref 8–23)
BUN/CREAT SERPL: 19.3 (ref 7–25)
CALCIUM SPEC-SCNC: 9 MG/DL (ref 8.6–10.5)
CHLORIDE SERPL-SCNC: 103 MMOL/L (ref 98–107)
CO2 SERPL-SCNC: 26.9 MMOL/L (ref 22–29)
CREAT SERPL-MCNC: 1.35 MG/DL (ref 0.76–1.27)
EGFRCR SERPLBLD CKD-EPI 2021: 59 ML/MIN/1.73
FOLATE SERPL-MCNC: 16.5 NG/ML (ref 4.78–24.2)
GLOBULIN UR ELPH-MCNC: 3.7 GM/DL
GLUCOSE SERPL-MCNC: 94 MG/DL (ref 65–99)
HBA1C MFR BLD: 6.9 % (ref 4.8–5.6)
POTASSIUM SERPL-SCNC: 4.2 MMOL/L (ref 3.5–5.2)
PROT SERPL-MCNC: 7.7 G/DL (ref 6–8.5)
SODIUM SERPL-SCNC: 143 MMOL/L (ref 136–145)
T4 FREE SERPL-MCNC: 1.16 NG/DL (ref 0.93–1.7)
TSH SERPL DL<=0.05 MIU/L-ACNC: 4.34 UIU/ML (ref 0.27–4.2)
VIT B12 BLD-MCNC: 488 PG/ML (ref 211–946)

## 2022-07-20 PROCEDURE — 82746 ASSAY OF FOLIC ACID SERUM: CPT

## 2022-07-20 PROCEDURE — 82607 VITAMIN B-12: CPT

## 2022-07-20 PROCEDURE — 80053 COMPREHEN METABOLIC PANEL: CPT

## 2022-07-20 PROCEDURE — 36415 COLL VENOUS BLD VENIPUNCTURE: CPT

## 2022-07-20 PROCEDURE — 83036 HEMOGLOBIN GLYCOSYLATED A1C: CPT

## 2022-07-20 PROCEDURE — 84443 ASSAY THYROID STIM HORMONE: CPT

## 2022-07-20 PROCEDURE — 84439 ASSAY OF FREE THYROXINE: CPT

## 2022-08-02 DIAGNOSIS — I10 ESSENTIAL HYPERTENSION: Primary | ICD-10-CM

## 2022-08-02 DIAGNOSIS — E78.2 MIXED HYPERLIPIDEMIA: ICD-10-CM

## 2022-08-03 RX ORDER — ATORVASTATIN CALCIUM 20 MG/1
20 TABLET, FILM COATED ORAL NIGHTLY
Qty: 90 TABLET | Refills: 0 | Status: SHIPPED | OUTPATIENT
Start: 2022-08-03 | End: 2022-08-26 | Stop reason: SDUPTHER

## 2022-08-03 RX ORDER — IRBESARTAN 150 MG/1
150 TABLET ORAL DAILY
Qty: 90 TABLET | Refills: 0 | Status: SHIPPED | OUTPATIENT
Start: 2022-08-03 | End: 2022-08-26 | Stop reason: SDUPTHER

## 2022-08-03 RX ORDER — SIMVASTATIN 40 MG
TABLET ORAL
Qty: 90 TABLET | Refills: 0 | OUTPATIENT
Start: 2022-08-03

## 2022-08-03 RX ORDER — IRBESARTAN 150 MG/1
TABLET ORAL
Qty: 90 TABLET | Refills: 0 | OUTPATIENT
Start: 2022-08-03

## 2022-08-03 NOTE — TELEPHONE ENCOUNTER
Pt states he is taking amlodipine, ok with changing over to atorvastatin, med pended, appt scheduled.

## 2022-08-03 NOTE — TELEPHONE ENCOUNTER
Please let TJ know that I have reviewed his refill requests.  Confirm if he is taking amlodipine regularly at this time.  If so, I would recommend changing the cholesterol medication from simvastatin to atorvastatin.  There is some chance of an interaction between amlodipine and simvastatin.  If he is agreeable, then please queue atorvastatin 20mg qhs #90 - no refill.  Also, please schedule follow up visit.  We need to see him, review his care, and refill medications longer term.  Thanks.

## 2022-08-26 ENCOUNTER — OFFICE VISIT (OUTPATIENT)
Dept: FAMILY MEDICINE CLINIC | Age: 62
End: 2022-08-26

## 2022-08-26 VITALS
TEMPERATURE: 98.4 F | BODY MASS INDEX: 27.71 KG/M2 | DIASTOLIC BLOOD PRESSURE: 79 MMHG | SYSTOLIC BLOOD PRESSURE: 161 MMHG | WEIGHT: 204.6 LBS | HEART RATE: 101 BPM | HEIGHT: 72 IN

## 2022-08-26 DIAGNOSIS — Z11.59 NEED FOR HEPATITIS C SCREENING TEST: ICD-10-CM

## 2022-08-26 DIAGNOSIS — E78.2 MIXED HYPERLIPIDEMIA: ICD-10-CM

## 2022-08-26 DIAGNOSIS — Z12.5 SCREENING FOR PROSTATE CANCER: ICD-10-CM

## 2022-08-26 DIAGNOSIS — Z00.00 PHYSICAL EXAM: Primary | ICD-10-CM

## 2022-08-26 DIAGNOSIS — I10 ESSENTIAL HYPERTENSION: ICD-10-CM

## 2022-08-26 PROCEDURE — 1159F MED LIST DOCD IN RCRD: CPT | Performed by: FAMILY MEDICINE

## 2022-08-26 PROCEDURE — G0439 PPPS, SUBSEQ VISIT: HCPCS | Performed by: FAMILY MEDICINE

## 2022-08-26 PROCEDURE — 1170F FXNL STATUS ASSESSED: CPT | Performed by: FAMILY MEDICINE

## 2022-08-26 RX ORDER — IRBESARTAN 150 MG/1
150 TABLET ORAL DAILY
Qty: 90 TABLET | Refills: 3 | Status: SHIPPED | OUTPATIENT
Start: 2022-08-26

## 2022-08-26 RX ORDER — ATORVASTATIN CALCIUM 20 MG/1
20 TABLET, FILM COATED ORAL NIGHTLY
Qty: 90 TABLET | Refills: 3 | Status: SHIPPED | OUTPATIENT
Start: 2022-08-26

## 2022-08-26 NOTE — PROGRESS NOTES
The ABCs of the Annual Wellness Visit  Subsequent Medicare Wellness Visit    Chief Complaint:  Medicare wellness exam, blood pressure, cholesterol    Subjective    History of Present Illness:  Nicholas Givens is a 62 y.o. male who presents for a Subsequent Medicare Wellness Visit.    The following portions of the patient's history were reviewed and updated as appropriate: allergies, current medications, past family history, past medical history, past social history, past surgical history and problem list. {Optional Link Review (Popup) :23}    Compared to one year ago, the patient feels his physical health is the same.    Compared to one year ago, the patient feels his mental health is the same.    Recent Hospitalizations:  He was not admitted to the hospital during the last year.       Current Medical Providers:  Patient Care Team:  Buck Aparicio MD as PCP - General (Family Medicine)    Outpatient Medications Prior to Visit   Medication Sig Dispense Refill   • amLODIPine (NORVASC) 5 MG tablet Take 5 mg by mouth Daily.     • aspirin 325 MG tablet aspirin 325 mg oral tablet take 1 tablet (325 mg) by oral route once daily   Active     • glimepiride (AMARYL) 4 MG tablet Take 4 mg by mouth 2 (two) times a day.     • glucose blood (OneTouch Ultra) test strip USE 1 STRIP TO CHECK GLUCOSE 4 TIMES DAILY     • hydroCHLOROthiazide (HYDRODIURIL) 12.5 MG tablet Take 12.5 mg by mouth Daily.     • insulin aspart (NovoLOG FlexPen) 100 UNIT/ML solution pen-injector sc pen Sliding scale     • insulin detemir (Levemir FlexTouch) 100 UNIT/ML injection Inject 50 Units under the skin into the appropriate area as directed Every Night.     • Insulin Pen Needle (NovoFine Plus) 32G X 4 MM misc USE 1 PEN NEEDLE TO INJECT VICTOZA UNDER THE SKIN ONCE DAILY     • metFORMIN (GLUCOPHAGE) 500 MG tablet TAKE 1 TABLET BY MOUTH IN THE MORNING WITH BREAKFAST AND 2 TABLETS WITH SUPPER     • Ozempic, 0.25 or 0.5 MG/DOSE, 2 MG/1.5ML solution  pen-injector 0.5 mg 1 (One) Time Per Week.     • atorvastatin (LIPITOR) 20 MG tablet Take 1 tablet by mouth Every Night. 90 tablet 0   • irbesartan (AVAPRO) 150 MG tablet Take 1 tablet by mouth Daily. 90 tablet 0   • amoxicillin-clavulanate (Augmentin) 875-125 MG per tablet Take 1 tablet by mouth 2 (Two) Times a Day. 20 tablet 0   • bimatoprost (Lumigan) 0.01 % ophthalmic drops Lumigan 0.01 % eye drops     • HYDROcodone-acetaminophen (NORCO) 7.5-325 MG per tablet hydrocodone 7.5 mg-acetaminophen 325 mg tablet     • latanoprost (XALATAN) 0.005 % ophthalmic solution latanoprost 0.005 % eye drops     • levothyroxine (SYNTHROID, LEVOTHROID) 50 MCG tablet Take 50 mcg by mouth Daily.       No facility-administered medications prior to visit.       No opioid medication identified on active medication list. I have reviewed chart for other potential  high risk medication/s and harmful drug interactions in the elderly.          Aspirin is on active medication list. Aspirin use is indicated based on review of current medical condition/s. Pros and cons of this therapy have been discussed today. Benefits of this medication outweigh potential harm.  Patient has been encouraged to continue taking this medication.  .      Patient Active Problem List   Diagnosis   • Essential hypertension   • Mixed hyperlipidemia     Advance Care Planning   Advance Directive is not on file.  ACP discussion was held with the patient during this visit. Patient does not have an advance directive, information provided.  His daughter, Crystal, would make decisions if needed.      Review of Systems:  Review of Systems   Constitutional: Negative for chills and fever.   Respiratory: Negative for cough and shortness of breath.    Cardiovascular: Negative for chest pain and palpitations.   Gastrointestinal: Negative for abdominal pain, nausea and vomiting.         Objective       Vitals:    08/26/22 0845 08/26/22 0917   BP: 143/81 161/79   BP Location: Right arm  "Right arm   Patient Position: Sitting Sitting   Pulse: 88 101   Temp: 98.4 °F (36.9 °C)    TempSrc: Oral    Weight: 92.8 kg (204 lb 9.6 oz)    Height: 182.9 cm (72.01\")      BMI Readings from Last 1 Encounters:   08/26/22 27.74 kg/m²   BMI is above normal parameters. Recommendations include: exercise counseling and nutrition counseling    Does the patient have evidence of cognitive impairment? No    Physical Exam  Vitals and nursing note reviewed. Chaperone present: patient declined chaperone.   Constitutional:       General: He is not in acute distress.     Appearance: He is not ill-appearing.   HENT:      Right Ear: Tympanic membrane and ear canal normal.      Left Ear: Tympanic membrane and ear canal normal.      Ears:      Comments: Hearing is normal in the right ear with forced whisper.  There is some decreased hearing in the left ear.     Mouth/Throat:      Mouth: Mucous membranes are moist.      Comments: Pharynx appears normal  Eyes:      Extraocular Movements: Extraocular movements intact.      Pupils: Pupils are equal, round, and reactive to light.      Comments: Binocular vision is 20/20 with correction.   Neck:      Thyroid: No thyromegaly.   Cardiovascular:      Rate and Rhythm: Normal rate and regular rhythm.      Pulses:           Dorsalis pedis pulses are 2+ on the right side and 2+ on the left side.      Heart sounds: No murmur heard.  Pulmonary:      Effort: Pulmonary effort is normal.      Breath sounds: Normal breath sounds.      Comments: Trach site looks good.  Abdominal:      General: There is no distension.      Palpations: Abdomen is soft. There is no mass.      Tenderness: There is no abdominal tenderness.   Genitourinary:     Prostate: Not enlarged and no nodules present.      Rectum: Guaiac result negative. No mass.   Musculoskeletal:      Cervical back: Normal range of motion.   Feet:      Right foot:      Protective Sensation: 3 sites tested. 3 sites sensed.      Skin integrity: Skin " integrity normal.      Left foot:      Protective Sensation: 3 sites tested. 3 sites sensed.      Skin integrity: Skin integrity normal.   Skin:     Findings: No lesion or rash.   Neurological:      General: No focal deficit present.      Mental Status: He is oriented to person, place, and time.      Cranial Nerves: No cranial nerve deficit.   Psychiatric:         Mood and Affect: Mood normal.       The following data was reviewed by Buck Aparicio MD on 08/26/2022.  Lab Results   Component Value Date    WBC 9.07 07/01/2021    HGB 16.2 07/01/2021    HCT 49.1 07/01/2021    MCV 90.3 07/01/2021     07/01/2021     Lab Results   Component Value Date    GLUCOSE 94 07/20/2022    BUN 26 (H) 07/20/2022    CREATININE 1.35 (H) 07/20/2022     07/20/2022    K 4.2 07/20/2022     07/20/2022    CO2 26.9 07/20/2022    CALCIUM 9.0 07/20/2022    PROTEINTOT 7.7 07/20/2022    ALBUMIN 4.00 07/20/2022    ALT 20 07/20/2022    AST 19 07/20/2022    ALKPHOS 87 07/20/2022    BILITOT 0.2 07/20/2022    EGFRIFNONA 60 (L) 11/03/2021    GLOB 3.7 07/20/2022    AGRATIO 1.1 07/20/2022    BCR 19.3 07/20/2022    ANIONGAP 13.1 07/20/2022      Lab Results   Component Value Date    CHOL 130 03/08/2022    CHLPL 116 03/08/2021    TRIG 168 (H) 03/08/2022    HDL 27 (L) 03/08/2022    LDL 74 03/08/2022     Lab Results   Component Value Date    TSH 4.340 (H) 07/20/2022     Lab Results   Component Value Date    HGBA1C 6.90 (H) 07/20/2022     Lab Results   Component Value Date    PSA 1.05 04/29/2020          HEALTH RISK ASSESSMENT    Smoking Status:  Social History     Tobacco Use   Smoking Status Former Smoker   • Quit date: 2007   • Years since quitting: 15.6   Smokeless Tobacco Never Used   Tobacco Comment    quit 2007     Alcohol Consumption:  Social History     Substance and Sexual Activity   Alcohol Use None     Fall Risk Screen:    STEADI Fall Risk Assessment has not been completed.    Depression Screening:  PHQ-2/PHQ-9 Depression  Screening 1/10/2022   Retired PHQ-9 Total Score 0   Retired Total Score 0       Health Habits and Functional and Cognitive Screening:  Functional & Cognitive Status 8/26/2022   Do you have difficulty preparing food and eating? No   Do you have difficulty bathing yourself, getting dressed or grooming yourself? No   Do you have difficulty using the toilet? No   Do you have difficulty moving around from place to place? No   Do you have trouble with steps or getting out of a bed or a chair? No   Current Diet Well Balanced Diet   Dental Exam Up to date   Eye Exam Up to date   Exercise (times per week) 0 times per week   Current Exercises Include No Regular Exercise   Do you need help using the phone?  No   Are you deaf or do you have serious difficulty hearing?  No   Do you need help with transportation? No   Do you need help shopping? No   Do you need help preparing meals?  No   Do you need help with housework?  No   Do you need help with laundry? No   Do you need help taking your medications? No   Do you need help managing money? No   Do you ever drive or ride in a car without wearing a seat belt? Yes   Have you felt unusual stress, anger or loneliness in the last month? No   Who do you live with? Alone   If you need help, do you have trouble finding someone available to you? No   Have you been bothered in the last four weeks by sexual problems? No   Do you have difficulty concentrating, remembering or making decisions? No       Age-appropriate Screening Schedule:  Refer to the list below for future screening recommendations based on patient's age, sex and/or medical conditions. Orders for these recommended tests are listed in the plan section. The patient has been provided with a written plan.    Health Maintenance   Topic Date Due   • ZOSTER VACCINE (1 of 2) Never done   • INFLUENZA VACCINE  10/01/2022   • HEMOGLOBIN A1C  01/20/2023   • LIPID PANEL  03/08/2023   • URINE MICROALBUMIN  03/08/2023   • DIABETIC EYE EXAM   03/18/2023   • DIABETIC FOOT EXAM  08/26/2023   • TDAP/TD VACCINES (2 - Td or Tdap) 01/10/2032                     Assessment and Plan:       CMS Preventative Services Quick Reference  Risk Factors Identified During Encounter  Cardiovascular Disease  Immunizations Discussed/Encouraged (specific Immunizations; Influenza, Shingrix and COVID19    The above risks/problems have been discussed with the patient.  Follow up actions/plans if indicated are seen below in the Assessment/Plan Section.  Pertinent information has been shared with the patient in the After Visit Summary.    Today, we have reviewed CHANEL's care.  He seems to be doing well overall.  With regard to the wellness exam, he is mostly up-to-date on needed screenings.  We will move ahead with additional labs as noted below at his convenience.  I have asked that he take these lab orders with him to his upcoming endocrinology visit.  Otherwise, we would recommend he consider COVID-19 booster and flu shot when the omicron booster is available.  He may also want to check with his pharmacy about shingles vaccine.  His usual medications are refilled for a year as noted below.  His problems remained stable at this time.  He sees endocrinology for his diabetes care.    Diagnoses and all orders for this visit:    1. Physical exam (Primary)    2. Essential hypertension  -     irbesartan (AVAPRO) 150 MG tablet; Take 1 tablet by mouth Daily.  Dispense: 90 tablet; Refill: 3    3. Mixed hyperlipidemia  -     atorvastatin (LIPITOR) 20 MG tablet; Take 1 tablet by mouth Every Night.  Dispense: 90 tablet; Refill: 3    4. Need for hepatitis C screening test  -     Hepatitis C Antibody; Future    5. Screening for prostate cancer  -     PSA Screen; Future        Follow Up:   Return in about 1 year (around 8/26/2023) for Recheck, Medicare Wellness.     An After Visit Summary and PPPS were given to the patient.

## 2022-09-08 ENCOUNTER — LAB (OUTPATIENT)
Dept: LAB | Facility: HOSPITAL | Age: 62
End: 2022-09-08

## 2022-09-08 ENCOUNTER — TRANSCRIBE ORDERS (OUTPATIENT)
Dept: ADMINISTRATIVE | Facility: HOSPITAL | Age: 62
End: 2022-09-08

## 2022-09-08 DIAGNOSIS — Z11.59 NEED FOR HEPATITIS C SCREENING TEST: ICD-10-CM

## 2022-09-08 DIAGNOSIS — Z12.5 SCREENING FOR PROSTATE CANCER: ICD-10-CM

## 2022-09-08 DIAGNOSIS — E03.9 HYPOTHYROIDISM, UNSPECIFIED TYPE: Primary | ICD-10-CM

## 2022-09-08 DIAGNOSIS — E03.9 HYPOTHYROIDISM, UNSPECIFIED TYPE: ICD-10-CM

## 2022-09-08 LAB
HCV AB SER DONR QL: NORMAL
PSA SERPL-MCNC: 1.12 NG/ML (ref 0–4)
T4 FREE SERPL-MCNC: 1.29 NG/DL (ref 0.93–1.7)
TSH SERPL DL<=0.05 MIU/L-ACNC: 3.78 UIU/ML (ref 0.27–4.2)

## 2022-09-08 PROCEDURE — 36415 COLL VENOUS BLD VENIPUNCTURE: CPT

## 2022-09-08 PROCEDURE — 84443 ASSAY THYROID STIM HORMONE: CPT

## 2022-09-08 PROCEDURE — 86803 HEPATITIS C AB TEST: CPT

## 2022-09-08 PROCEDURE — G0103 PSA SCREENING: HCPCS

## 2022-09-08 PROCEDURE — 84439 ASSAY OF FREE THYROXINE: CPT

## 2022-09-14 ENCOUNTER — CLINICAL SUPPORT (OUTPATIENT)
Dept: FAMILY MEDICINE CLINIC | Age: 62
End: 2022-09-14

## 2022-09-14 VITALS — HEART RATE: 92 BPM | DIASTOLIC BLOOD PRESSURE: 70 MMHG | SYSTOLIC BLOOD PRESSURE: 120 MMHG

## 2022-09-14 NOTE — PROGRESS NOTES
Vitals:    09/14/22 0958   BP: 120/70   BP Location: Left arm   Patient Position: Sitting   Pulse: 92     Good afternoon, TJ.  Your blood pressure is well controlled on this check.  I would advise no change in your medications.  I feel free to stop by the allergy room again in a month or so for another blood pressure check if you would like.  Let me know if you have other concerns.    Buck Aparicio MD  [facility]

## 2022-11-04 ENCOUNTER — LAB (OUTPATIENT)
Dept: LAB | Facility: HOSPITAL | Age: 62
End: 2022-11-04

## 2022-11-04 ENCOUNTER — TRANSCRIBE ORDERS (OUTPATIENT)
Dept: ADMINISTRATIVE | Facility: HOSPITAL | Age: 62
End: 2022-11-04

## 2022-11-04 ENCOUNTER — CLINICAL SUPPORT (OUTPATIENT)
Dept: FAMILY MEDICINE CLINIC | Age: 62
End: 2022-11-04

## 2022-11-04 DIAGNOSIS — E11.9 DIABETES MELLITUS WITHOUT COMPLICATION: ICD-10-CM

## 2022-11-04 DIAGNOSIS — E11.9 DIABETES MELLITUS WITHOUT COMPLICATION: Primary | ICD-10-CM

## 2022-11-04 DIAGNOSIS — Z23 IMMUNIZATION DUE: Primary | ICD-10-CM

## 2022-11-04 LAB
ALBUMIN SERPL-MCNC: 4.3 G/DL (ref 3.5–5.2)
ALBUMIN UR-MCNC: 16.6 MG/DL
ALBUMIN/GLOB SERPL: 1.5 G/DL
ALP SERPL-CCNC: 90 U/L (ref 39–117)
ALT SERPL W P-5'-P-CCNC: 32 U/L (ref 1–41)
ANION GAP SERPL CALCULATED.3IONS-SCNC: 7.7 MMOL/L (ref 5–15)
AST SERPL-CCNC: 27 U/L (ref 1–40)
BILIRUB SERPL-MCNC: 0.2 MG/DL (ref 0–1.2)
BUN SERPL-MCNC: 26 MG/DL (ref 8–23)
BUN/CREAT SERPL: 17.7 (ref 7–25)
CALCIUM SPEC-SCNC: 9.3 MG/DL (ref 8.6–10.5)
CHLORIDE SERPL-SCNC: 102 MMOL/L (ref 98–107)
CHOLEST SERPL-MCNC: 140 MG/DL (ref 0–200)
CO2 SERPL-SCNC: 29.3 MMOL/L (ref 22–29)
CREAT SERPL-MCNC: 1.47 MG/DL (ref 0.76–1.27)
CREAT UR-MCNC: 330.5 MG/DL
EGFRCR SERPLBLD CKD-EPI 2021: 53.3 ML/MIN/1.73
GLOBULIN UR ELPH-MCNC: 2.9 GM/DL
GLUCOSE SERPL-MCNC: 145 MG/DL (ref 65–99)
HBA1C MFR BLD: 8.1 % (ref 4.8–5.6)
HDLC SERPL-MCNC: 30 MG/DL (ref 40–60)
LDLC SERPL CALC-MCNC: 90 MG/DL (ref 0–100)
LDLC/HDLC SERPL: 2.93 {RATIO}
MICROALBUMIN/CREAT UR: 50.2 MG/G
POTASSIUM SERPL-SCNC: 4.2 MMOL/L (ref 3.5–5.2)
PROT SERPL-MCNC: 7.2 G/DL (ref 6–8.5)
SODIUM SERPL-SCNC: 139 MMOL/L (ref 136–145)
T4 FREE SERPL-MCNC: 1.26 NG/DL (ref 0.93–1.7)
TRIGL SERPL-MCNC: 110 MG/DL (ref 0–150)
TSH SERPL DL<=0.05 MIU/L-ACNC: 2.43 UIU/ML (ref 0.27–4.2)
VLDLC SERPL-MCNC: 20 MG/DL (ref 5–40)

## 2022-11-04 PROCEDURE — 82043 UR ALBUMIN QUANTITATIVE: CPT

## 2022-11-04 PROCEDURE — 82570 ASSAY OF URINE CREATININE: CPT

## 2022-11-04 PROCEDURE — 84439 ASSAY OF FREE THYROXINE: CPT

## 2022-11-04 PROCEDURE — G0008 ADMIN INFLUENZA VIRUS VAC: HCPCS | Performed by: FAMILY MEDICINE

## 2022-11-04 PROCEDURE — 80061 LIPID PANEL: CPT

## 2022-11-04 PROCEDURE — 83036 HEMOGLOBIN GLYCOSYLATED A1C: CPT

## 2022-11-04 PROCEDURE — 84443 ASSAY THYROID STIM HORMONE: CPT

## 2022-11-04 PROCEDURE — 90686 IIV4 VACC NO PRSV 0.5 ML IM: CPT | Performed by: FAMILY MEDICINE

## 2022-11-04 PROCEDURE — 36415 COLL VENOUS BLD VENIPUNCTURE: CPT

## 2022-11-04 PROCEDURE — 80053 COMPREHEN METABOLIC PANEL: CPT

## 2023-02-07 ENCOUNTER — LAB (OUTPATIENT)
Dept: LAB | Facility: HOSPITAL | Age: 63
End: 2023-02-07
Payer: MEDICARE

## 2023-02-07 ENCOUNTER — TRANSCRIBE ORDERS (OUTPATIENT)
Dept: ADMINISTRATIVE | Facility: HOSPITAL | Age: 63
End: 2023-02-07
Payer: MEDICARE

## 2023-02-07 DIAGNOSIS — E11.9 DIABETES MELLITUS WITHOUT COMPLICATION: Primary | ICD-10-CM

## 2023-02-07 DIAGNOSIS — I10 HYPERTENSION, ESSENTIAL: ICD-10-CM

## 2023-02-07 DIAGNOSIS — E66.3 OVER WEIGHT: ICD-10-CM

## 2023-02-07 DIAGNOSIS — E03.9 HYPOTHYROIDISM, UNSPECIFIED TYPE: ICD-10-CM

## 2023-02-07 DIAGNOSIS — Z79.4 ENCOUNTER FOR LONG-TERM (CURRENT) USE OF INSULIN: ICD-10-CM

## 2023-02-07 DIAGNOSIS — E78.5 HYPERLIPIDEMIA, UNSPECIFIED HYPERLIPIDEMIA TYPE: ICD-10-CM

## 2023-02-07 DIAGNOSIS — E11.9 DIABETES MELLITUS WITHOUT COMPLICATION: ICD-10-CM

## 2023-02-07 DIAGNOSIS — Z79.84 LONG TERM (CURRENT) USE OF ORAL HYPOGLYCEMIC DRUGS: ICD-10-CM

## 2023-02-07 LAB
ALBUMIN SERPL-MCNC: 4 G/DL (ref 3.5–5.2)
ALBUMIN/GLOB SERPL: 1.3 G/DL
ALP SERPL-CCNC: 109 U/L (ref 39–117)
ALT SERPL W P-5'-P-CCNC: 26 U/L (ref 1–41)
ANION GAP SERPL CALCULATED.3IONS-SCNC: 9.1 MMOL/L (ref 5–15)
AST SERPL-CCNC: 25 U/L (ref 1–40)
BILIRUB SERPL-MCNC: 0.2 MG/DL (ref 0–1.2)
BUN SERPL-MCNC: 36 MG/DL (ref 8–23)
BUN/CREAT SERPL: 25.2 (ref 7–25)
CALCIUM SPEC-SCNC: 8.9 MG/DL (ref 8.6–10.5)
CHLORIDE SERPL-SCNC: 105 MMOL/L (ref 98–107)
CO2 SERPL-SCNC: 25.9 MMOL/L (ref 22–29)
CREAT SERPL-MCNC: 1.43 MG/DL (ref 0.76–1.27)
EGFRCR SERPLBLD CKD-EPI 2021: 55.1 ML/MIN/1.73
GLOBULIN UR ELPH-MCNC: 3.2 GM/DL
GLUCOSE SERPL-MCNC: 154 MG/DL (ref 65–99)
HBA1C MFR BLD: 7.3 % (ref 4.8–5.6)
POTASSIUM SERPL-SCNC: 4.5 MMOL/L (ref 3.5–5.2)
PROT SERPL-MCNC: 7.2 G/DL (ref 6–8.5)
SODIUM SERPL-SCNC: 140 MMOL/L (ref 136–145)

## 2023-02-07 PROCEDURE — 36415 COLL VENOUS BLD VENIPUNCTURE: CPT

## 2023-02-07 PROCEDURE — 83036 HEMOGLOBIN GLYCOSYLATED A1C: CPT

## 2023-02-07 PROCEDURE — 80053 COMPREHEN METABOLIC PANEL: CPT

## 2023-05-09 ENCOUNTER — LAB (OUTPATIENT)
Dept: LAB | Facility: HOSPITAL | Age: 63
End: 2023-05-09
Payer: MEDICARE

## 2023-05-09 ENCOUNTER — TRANSCRIBE ORDERS (OUTPATIENT)
Dept: ADMINISTRATIVE | Facility: HOSPITAL | Age: 63
End: 2023-05-09
Payer: MEDICARE

## 2023-05-09 DIAGNOSIS — Z79.4 ENCOUNTER FOR LONG-TERM (CURRENT) USE OF INSULIN: ICD-10-CM

## 2023-05-09 DIAGNOSIS — E78.5 HYPERLIPIDEMIA, UNSPECIFIED HYPERLIPIDEMIA TYPE: ICD-10-CM

## 2023-05-09 DIAGNOSIS — I10 HYPERTENSION, ESSENTIAL: ICD-10-CM

## 2023-05-09 DIAGNOSIS — E11.9 DIABETES MELLITUS WITHOUT COMPLICATION: Primary | ICD-10-CM

## 2023-05-09 DIAGNOSIS — E11.9 DIABETES MELLITUS WITHOUT COMPLICATION: ICD-10-CM

## 2023-05-09 DIAGNOSIS — Z79.84 LONG TERM CURRENT USE OF ORAL HYPOGLYCEMIC DRUG: ICD-10-CM

## 2023-05-09 LAB
ALBUMIN SERPL-MCNC: 4.3 G/DL (ref 3.5–5.2)
ALBUMIN/GLOB SERPL: 1.4 G/DL
ALP SERPL-CCNC: 101 U/L (ref 39–117)
ALT SERPL W P-5'-P-CCNC: 27 U/L (ref 1–41)
ANION GAP SERPL CALCULATED.3IONS-SCNC: 8 MMOL/L (ref 5–15)
AST SERPL-CCNC: 25 U/L (ref 1–40)
BILIRUB SERPL-MCNC: 0.2 MG/DL (ref 0–1.2)
BUN SERPL-MCNC: 19 MG/DL (ref 8–23)
BUN/CREAT SERPL: 15.1 (ref 7–25)
CALCIUM SPEC-SCNC: 9 MG/DL (ref 8.6–10.5)
CHLORIDE SERPL-SCNC: 104 MMOL/L (ref 98–107)
CO2 SERPL-SCNC: 29 MMOL/L (ref 22–29)
CREAT SERPL-MCNC: 1.26 MG/DL (ref 0.76–1.27)
EGFRCR SERPLBLD CKD-EPI 2021: 64.1 ML/MIN/1.73
GLOBULIN UR ELPH-MCNC: 3.1 GM/DL
GLUCOSE SERPL-MCNC: 97 MG/DL (ref 65–99)
HBA1C MFR BLD: 7.3 % (ref 4.8–5.6)
POTASSIUM SERPL-SCNC: 4.4 MMOL/L (ref 3.5–5.2)
PROT SERPL-MCNC: 7.4 G/DL (ref 6–8.5)
SODIUM SERPL-SCNC: 141 MMOL/L (ref 136–145)

## 2023-05-09 PROCEDURE — 84681 ASSAY OF C-PEPTIDE: CPT

## 2023-05-09 PROCEDURE — 83036 HEMOGLOBIN GLYCOSYLATED A1C: CPT

## 2023-05-09 PROCEDURE — 36415 COLL VENOUS BLD VENIPUNCTURE: CPT

## 2023-05-09 PROCEDURE — 80053 COMPREHEN METABOLIC PANEL: CPT

## 2023-05-10 LAB — C PEPTIDE SERPL-MCNC: 4.5 NG/ML (ref 1.1–4.4)

## 2023-05-18 ENCOUNTER — TRANSCRIBE ORDERS (OUTPATIENT)
Dept: ADMINISTRATIVE | Facility: HOSPITAL | Age: 63
End: 2023-05-18
Payer: MEDICARE

## 2023-05-18 DIAGNOSIS — R09.89 CAROTID BRUIT, UNSPECIFIED LATERALITY: Primary | ICD-10-CM

## 2023-05-24 ENCOUNTER — HOSPITAL ENCOUNTER (OUTPATIENT)
Dept: CARDIOLOGY | Facility: HOSPITAL | Age: 63
Discharge: HOME | End: 2023-05-24
Admitting: NURSE PRACTITIONER
Payer: MEDICARE

## 2023-05-24 DIAGNOSIS — R09.89 CAROTID BRUIT, UNSPECIFIED LATERALITY: ICD-10-CM

## 2023-05-24 LAB
BH CV XLRA MEAS LEFT CAROTID BULB EDV: 5 CM/SEC
BH CV XLRA MEAS LEFT CAROTID BULB PSV: 25 CM/SEC
BH CV XLRA MEAS LEFT DIST CCA EDV: 26.7 CM/SEC
BH CV XLRA MEAS LEFT DIST CCA PSV: 114 CM/SEC
BH CV XLRA MEAS LEFT DIST ICA EDV: -65.9 CM/SEC
BH CV XLRA MEAS LEFT DIST ICA PSV: -215 CM/SEC
BH CV XLRA MEAS LEFT ICA/CCA RATIO: -2.7
BH CV XLRA MEAS LEFT MID ICA EDV: -112 CM/SEC
BH CV XLRA MEAS LEFT MID ICA PSV: -310 CM/SEC
BH CV XLRA MEAS LEFT PROX CCA EDV: 27.4 CM/SEC
BH CV XLRA MEAS LEFT PROX CCA PSV: 114 CM/SEC
BH CV XLRA MEAS LEFT PROX ECA EDV: -24.3 CM/SEC
BH CV XLRA MEAS LEFT PROX ECA PSV: -122 CM/SEC
BH CV XLRA MEAS LEFT PROX ICA EDV: -23.6 CM/SEC
BH CV XLRA MEAS LEFT PROX ICA PSV: -62 CM/SEC
BH CV XLRA MEAS LEFT VERTEBRAL A EDV: 26.7 CM/SEC
BH CV XLRA MEAS LEFT VERTEBRAL A PSV: 61.5 CM/SEC
BH CV XLRA MEAS RIGHT CAROTID BULB EDV: 14 CM/SEC
BH CV XLRA MEAS RIGHT CAROTID BULB PSV: 53 CM/SEC
BH CV XLRA MEAS RIGHT DIST CCA EDV: 24.5 CM/SEC
BH CV XLRA MEAS RIGHT DIST CCA PSV: 85.5 CM/SEC
BH CV XLRA MEAS RIGHT DIST ICA EDV: -48.4 CM/SEC
BH CV XLRA MEAS RIGHT DIST ICA PSV: -130 CM/SEC
BH CV XLRA MEAS RIGHT ICA/CCA RATIO: -1.66
BH CV XLRA MEAS RIGHT MID ICA EDV: -56.2 CM/SEC
BH CV XLRA MEAS RIGHT MID ICA PSV: -142 CM/SEC
BH CV XLRA MEAS RIGHT PROX CCA EDV: 19.6 CM/SEC
BH CV XLRA MEAS RIGHT PROX CCA PSV: 131 CM/SEC
BH CV XLRA MEAS RIGHT PROX ECA EDV: -10.6 CM/SEC
BH CV XLRA MEAS RIGHT PROX ECA PSV: -56.4 CM/SEC
BH CV XLRA MEAS RIGHT PROX ICA EDV: -20.1 CM/SEC
BH CV XLRA MEAS RIGHT PROX ICA PSV: -55 CM/SEC
BH CV XLRA MEAS RIGHT VERTEBRAL A EDV: 0 CM/SEC
BH CV XLRA MEAS RIGHT VERTEBRAL A PSV: 0 CM/SEC
LEFT ARM BP: NORMAL MMHG
MAXIMAL PREDICTED HEART RATE: 157 BPM
RIGHT ARM BP: NORMAL MMHG
STRESS TARGET HR: 133 BPM

## 2023-05-24 PROCEDURE — 93880 EXTRACRANIAL BILAT STUDY: CPT

## 2023-08-02 ENCOUNTER — APPOINTMENT (OUTPATIENT)
Dept: GENERAL RADIOLOGY | Facility: HOSPITAL | Age: 63
End: 2023-08-02
Payer: MEDICARE

## 2023-08-02 ENCOUNTER — OFFICE VISIT (OUTPATIENT)
Dept: FAMILY MEDICINE CLINIC | Age: 63
End: 2023-08-02
Payer: MEDICARE

## 2023-08-02 ENCOUNTER — HOSPITAL ENCOUNTER (EMERGENCY)
Facility: HOSPITAL | Age: 63
Discharge: HOME | End: 2023-08-02
Attending: EMERGENCY MEDICINE
Payer: MEDICARE

## 2023-08-02 VITALS
WEIGHT: 195.77 LBS | HEART RATE: 94 BPM | BODY MASS INDEX: 26.52 KG/M2 | SYSTOLIC BLOOD PRESSURE: 113 MMHG | DIASTOLIC BLOOD PRESSURE: 74 MMHG | OXYGEN SATURATION: 92 % | TEMPERATURE: 98.2 F | RESPIRATION RATE: 20 BRPM | HEIGHT: 72 IN

## 2023-08-02 VITALS
DIASTOLIC BLOOD PRESSURE: 86 MMHG | SYSTOLIC BLOOD PRESSURE: 134 MMHG | HEIGHT: 72 IN | TEMPERATURE: 98.6 F | BODY MASS INDEX: 26.41 KG/M2 | WEIGHT: 195 LBS | OXYGEN SATURATION: 94 % | HEART RATE: 104 BPM

## 2023-08-02 DIAGNOSIS — R51.9 ACUTE NONINTRACTABLE HEADACHE, UNSPECIFIED HEADACHE TYPE: Primary | ICD-10-CM

## 2023-08-02 DIAGNOSIS — J95.00 TRACHEOSTOMY COMPLICATION, UNSPECIFIED COMPLICATION TYPE: ICD-10-CM

## 2023-08-02 DIAGNOSIS — Z43.0 TRACHEOSTOMY CARE: Primary | ICD-10-CM

## 2023-08-02 DIAGNOSIS — I65.23 BILATERAL CAROTID ARTERY STENOSIS: ICD-10-CM

## 2023-08-02 LAB
ALBUMIN SERPL-MCNC: 4.4 G/DL (ref 3.5–5.2)
ALBUMIN/GLOB SERPL: 1.1 G/DL
ALP SERPL-CCNC: 99 U/L (ref 39–117)
ALT SERPL W P-5'-P-CCNC: 11 U/L (ref 1–41)
ANION GAP SERPL CALCULATED.3IONS-SCNC: 13.1 MMOL/L (ref 5–15)
AST SERPL-CCNC: 13 U/L (ref 1–40)
BASOPHILS # BLD AUTO: 0.05 10*3/MM3 (ref 0–0.2)
BASOPHILS NFR BLD AUTO: 0.4 % (ref 0–1.5)
BILIRUB SERPL-MCNC: 0.6 MG/DL (ref 0–1.2)
BUN SERPL-MCNC: 27 MG/DL (ref 8–23)
BUN/CREAT SERPL: 18.9 (ref 7–25)
CALCIUM SPEC-SCNC: 10 MG/DL (ref 8.6–10.5)
CHLORIDE SERPL-SCNC: 100 MMOL/L (ref 98–107)
CO2 SERPL-SCNC: 27.9 MMOL/L (ref 22–29)
CREAT SERPL-MCNC: 1.43 MG/DL (ref 0.76–1.27)
DEPRECATED RDW RBC AUTO: 42.6 FL (ref 37–54)
EGFRCR SERPLBLD CKD-EPI 2021: 54.7 ML/MIN/1.73
EOSINOPHIL # BLD AUTO: 0.02 10*3/MM3 (ref 0–0.4)
EOSINOPHIL NFR BLD AUTO: 0.2 % (ref 0.3–6.2)
ERYTHROCYTE [DISTWIDTH] IN BLOOD BY AUTOMATED COUNT: 13 % (ref 12.3–15.4)
GLOBULIN UR ELPH-MCNC: 4.1 GM/DL
GLUCOSE SERPL-MCNC: 203 MG/DL (ref 65–99)
HCT VFR BLD AUTO: 45.6 % (ref 37.5–51)
HGB BLD-MCNC: 15.3 G/DL (ref 13–17.7)
IMM GRANULOCYTES # BLD AUTO: 0.04 10*3/MM3 (ref 0–0.05)
IMM GRANULOCYTES NFR BLD AUTO: 0.4 % (ref 0–0.5)
LYMPHOCYTES # BLD AUTO: 1.21 10*3/MM3 (ref 0.7–3.1)
LYMPHOCYTES NFR BLD AUTO: 10.6 % (ref 19.6–45.3)
MCH RBC QN AUTO: 30.1 PG (ref 26.6–33)
MCHC RBC AUTO-ENTMCNC: 33.6 G/DL (ref 31.5–35.7)
MCV RBC AUTO: 89.8 FL (ref 79–97)
MONOCYTES # BLD AUTO: 1.02 10*3/MM3 (ref 0.1–0.9)
MONOCYTES NFR BLD AUTO: 8.9 % (ref 5–12)
NEUTROPHILS NFR BLD AUTO: 79.5 % (ref 42.7–76)
NEUTROPHILS NFR BLD AUTO: 9.06 10*3/MM3 (ref 1.7–7)
NRBC BLD AUTO-RTO: 0 /100 WBC (ref 0–0.2)
PLATELET # BLD AUTO: 227 10*3/MM3 (ref 140–450)
PMV BLD AUTO: 10.3 FL (ref 6–12)
POTASSIUM SERPL-SCNC: 5.2 MMOL/L (ref 3.5–5.2)
PROT SERPL-MCNC: 8.5 G/DL (ref 6–8.5)
RBC # BLD AUTO: 5.08 10*6/MM3 (ref 4.14–5.8)
SODIUM SERPL-SCNC: 141 MMOL/L (ref 136–145)
WBC NRBC COR # BLD: 11.4 10*3/MM3 (ref 3.4–10.8)

## 2023-08-02 PROCEDURE — 1160F RVW MEDS BY RX/DR IN RCRD: CPT

## 2023-08-02 PROCEDURE — 80053 COMPREHEN METABOLIC PANEL: CPT

## 2023-08-02 PROCEDURE — 3051F HG A1C>EQUAL 7.0%<8.0%: CPT

## 2023-08-02 PROCEDURE — 36415 COLL VENOUS BLD VENIPUNCTURE: CPT

## 2023-08-02 PROCEDURE — 3079F DIAST BP 80-89 MM HG: CPT

## 2023-08-02 PROCEDURE — 1159F MED LIST DOCD IN RCRD: CPT

## 2023-08-02 PROCEDURE — 96374 THER/PROPH/DIAG INJ IV PUSH: CPT

## 2023-08-02 PROCEDURE — 25010000002 KETOROLAC TROMETHAMINE PER 15 MG: Performed by: EMERGENCY MEDICINE

## 2023-08-02 PROCEDURE — 99283 EMERGENCY DEPT VISIT LOW MDM: CPT

## 2023-08-02 PROCEDURE — 99214 OFFICE O/P EST MOD 30 MIN: CPT

## 2023-08-02 PROCEDURE — 85025 COMPLETE CBC W/AUTO DIFF WBC: CPT

## 2023-08-02 PROCEDURE — 3075F SYST BP GE 130 - 139MM HG: CPT

## 2023-08-02 PROCEDURE — 71045 X-RAY EXAM CHEST 1 VIEW: CPT

## 2023-08-02 RX ORDER — KETOROLAC TROMETHAMINE 30 MG/ML
30 INJECTION, SOLUTION INTRAMUSCULAR; INTRAVENOUS ONCE
Status: COMPLETED | OUTPATIENT
Start: 2023-08-02 | End: 2023-08-02

## 2023-08-02 RX ORDER — LEVOTHYROXINE SODIUM 75 MCG
1 TABLET ORAL DAILY
COMMUNITY
Start: 2023-07-28

## 2023-08-02 RX ORDER — CEPHALEXIN 500 MG/1
500 CAPSULE ORAL 2 TIMES DAILY
Qty: 14 CAPSULE | Refills: 0 | Status: SHIPPED | OUTPATIENT
Start: 2023-08-02 | End: 2023-08-09

## 2023-08-02 RX ORDER — TRAMADOL HYDROCHLORIDE 50 MG/1
50 TABLET ORAL EVERY 8 HOURS PRN
Qty: 9 TABLET | Refills: 0 | Status: SHIPPED | OUTPATIENT
Start: 2023-08-02

## 2023-08-02 RX ORDER — KETOROLAC TROMETHAMINE 30 MG/ML
30 INJECTION, SOLUTION INTRAMUSCULAR; INTRAVENOUS ONCE
Status: DISCONTINUED | OUTPATIENT
Start: 2023-08-02 | End: 2023-08-02

## 2023-08-02 RX ADMIN — KETOROLAC TROMETHAMINE 30 MG: 30 INJECTION, SOLUTION INTRAMUSCULAR; INTRAVENOUS at 15:25

## 2023-08-02 NOTE — ED PROVIDER NOTES
Time: 2:51 PM EDT  Date of encounter:  8/2/2023  Independent Historian/Clinical History and Information was obtained by:   Patient    History is limited by: N/A    Chief Complaint   Patient presents with    tracheostomy problem     Pain around trach.          History of Present Illness:  Patient is a 63 y.o. year old male who presents to the emergency department for evaluation of tracheotomy problem.  Patient states that the tissue inferior to his trach is hardened over the past few days.  This seems to be a chronic/recurrent issue in the past.  Denies any drainage or significant swelling.  Patient denies fever, chills.  Patient denies chest pain or shortness of breath.  Patient does admit to mucus draining out of his trach.  No trauma to the tracheostomy site.    HPI    Patient Care Team  Primary Care Provider: Buck Aparicio MD    Past Medical History:     No Known Allergies  Past Medical History:   Diagnosis Date    Acquired hypothyroidism     Essential hypertension     Laryngeal cancer     Mixed hyperlipidemia     Nasal polyposis     Tobacco abuse     Tracheostomy present     Type 2 diabetes mellitus      Past Surgical History:   Procedure Laterality Date    COLONOSCOPY  07/30/2020    Serrated adenoma    CORONARY STENT PLACEMENT      KNEE SURGERY      NASAL POLYP SURGERY      TRACHEOSTOMY       Family History   Problem Relation Age of Onset    Hypertension Mother     Diabetes type II Mother     Heart attack Father     Hypertension Father     Asthma Sister     Coronary artery disease Brother        Home Medications:  Prior to Admission medications    Medication Sig Start Date End Date Taking? Authorizing Provider   amLODIPine (NORVASC) 5 MG tablet Take 1 tablet by mouth Daily. 4/27/21   ProviderJairo MD   aspirin 325 MG tablet aspirin 325 mg oral tablet take 1 tablet (325 mg) by oral route once daily   Active    Provider, MD Jairo   atorvastatin (LIPITOR) 20 MG tablet Take 1 tablet by mouth  Every Night. 22   Buck Aparicio MD   glimepiride (AMARYL) 4 MG tablet Take 1 tablet by mouth 2 (two) times a day. 21   Jairo Waters MD   glucose blood (OneTouch Ultra) test strip USE 1 STRIP TO CHECK GLUCOSE 4 TIMES DAILY 1/10/21   Jairo Waters MD   hydroCHLOROthiazide (HYDRODIURIL) 12.5 MG tablet Take 1 tablet by mouth Daily.    Jairo Waters MD   insulin aspart (NovoLOG FlexPen) 100 UNIT/ML solution pen-injector sc pen Sliding scale 21   Jairo Waters MD   insulin detemir (Levemir FlexTouch) 100 UNIT/ML injection Inject 50 Units under the skin into the appropriate area as directed Every Night.    Jairo Waters MD   Insulin Pen Needle (NovoFine Plus) 32G X 4 MM misc USE 1 PEN NEEDLE TO INJECT VICTOZA UNDER THE SKIN ONCE DAILY 21   Jairo Waters MD   irbesartan (AVAPRO) 150 MG tablet Take 1 tablet by mouth Daily. 22   Buck Aparicio MD   metFORMIN (GLUCOPHAGE) 500 MG tablet TAKE 1 TABLET BY MOUTH IN THE MORNING WITH BREAKFAST AND 2 TABLETS WITH SUPPER 21   Jairo Waters MD   Ozempic, 0.25 or 0.5 MG/DOSE, 2 MG/1.5ML solution pen-injector 0.5 mg 1 (One) Time Per Week. 21   Jairo Waters MD   Synthroid 75 MCG tablet Take 1 tablet by mouth Daily. 23   Jairo Waters MD   simvastatin (ZOCOR) 40 MG tablet Take 1 tablet by mouth Daily. 5/5/22 8/3/22  Shila Casiano MD        Social History:   Social History     Tobacco Use    Smoking status: Former     Types: Cigarettes     Quit date:      Years since quittin.5    Smokeless tobacco: Never    Tobacco comments:     quit          Review of Systems:  Review of Systems   Constitutional:  Negative for chills and fever.   HENT:  Negative for congestion, rhinorrhea and sore throat.    Eyes:  Negative for photophobia.   Respiratory:  Negative for apnea, cough, chest tightness and shortness of breath.    Cardiovascular:  Negative for  "chest pain and palpitations.   Gastrointestinal:  Negative for abdominal pain, diarrhea, nausea and vomiting.   Endocrine: Negative.    Genitourinary:  Negative for difficulty urinating and dysuria.   Musculoskeletal:  Negative for back pain, joint swelling and myalgias.   Skin:  Negative for color change and wound.   Allergic/Immunologic: Negative.    Neurological:  Negative for seizures and headaches.   Psychiatric/Behavioral: Negative.     All other systems reviewed and are negative.     Physical Exam:  /74   Pulse 94   Temp 98.2 øF (36.8 øC) (Oral)   Resp 20   Ht 182.9 cm (72\")   Wt 88.8 kg (195 lb 12.3 oz)   SpO2 92%   BMI 26.55 kg/mý         Physical Exam  Vitals and nursing note reviewed.   Constitutional:       General: He is awake.      Appearance: Normal appearance.   HENT:      Head: Normocephalic and atraumatic.      Nose: Nose normal.      Mouth/Throat:      Mouth: Mucous membranes are moist.   Eyes:      Extraocular Movements: Extraocular movements intact.      Pupils: Pupils are equal, round, and reactive to light.   Cardiovascular:      Rate and Rhythm: Normal rate and regular rhythm.      Heart sounds: Normal heart sounds.   Pulmonary:      Effort: Pulmonary effort is normal. No respiratory distress.      Breath sounds: Normal breath sounds. No wheezing, rhonchi or rales.   Abdominal:      General: Bowel sounds are normal.      Palpations: Abdomen is soft.      Tenderness: There is no abdominal tenderness. There is no guarding or rebound.      Comments: No rigidity   Musculoskeletal:         General: No tenderness. Normal range of motion.      Cervical back: Normal range of motion and neck supple.   Skin:     General: Skin is warm and dry.      Coloration: Skin is not jaundiced.   Neurological:      General: No focal deficit present.      Mental Status: He is alert and oriented to person, place, and time. Mental status is at baseline.      Sensory: Sensation is intact.      Motor: Motor " function is intact.      Coordination: Coordination is intact.   Psychiatric:         Attention and Perception: Attention and perception normal.         Mood and Affect: Mood and affect normal.         Speech: Speech normal.         Behavior: Behavior normal.         Judgment: Judgment normal.                  Procedures:  Procedures      Medical Decision Making:      Comorbidities that affect care:    Type 2 diabetes, hyperlipidemia, hypertension, laryngeal cancer    External Notes reviewed:    Encounter review: Office visit today with family medicine for acute headache.      The following orders were placed and all results were independently analyzed by me:  Orders Placed This Encounter   Procedures    Bag Drain Tracheal Aerosol Mist    XR Chest 1 View    Comprehensive Metabolic Panel    CBC Auto Differential    CBC & Differential       Medications Given in the Emergency Department:  Medications   ketorolac (TORADOL) injection 30 mg (30 mg Intravenous Given 8/2/23 1525)        ED Course:    The patient was initially evaluated in the triage area where orders were placed. The patient was later dispositioned by Julien Griffin MD.      The patient was advised to stay for completion of workup which includes but is not limited to communication of labs and radiological results, reassessment and plan. The patient was advised that leaving prior to disposition by a provider could result in critical findings that are not communicated to the patient.          Labs:    Lab Results (last 24 hours)       Procedure Component Value Units Date/Time    CBC & Differential [[id number]]  (Abnormal) Collected: 08/02/23 1231    Specimen: Blood Updated: 08/02/23 1240    Narrative:      The following orders were created for panel order CBC & Differential.  Procedure                               Abnormality         Status                     ---------                               -----------         ------                     CBC Auto  Differential[[id number]]        Abnormal            Final result                 Please view results for these tests on the individual orders.    Comprehensive Metabolic Panel [[id number]]  (Abnormal) Collected: 08/02/23 1231    Specimen: Blood Updated: 08/02/23 1257     Glucose 203 mg/dL      BUN 27 mg/dL      Creatinine 1.43 mg/dL      Sodium 141 mmol/L      Potassium 5.2 mmol/L      Chloride 100 mmol/L      CO2 27.9 mmol/L      Calcium 10.0 mg/dL      Total Protein 8.5 g/dL      Albumin 4.4 g/dL      ALT (SGPT) 11 U/L      AST (SGOT) 13 U/L      Alkaline Phosphatase 99 U/L      Total Bilirubin 0.6 mg/dL      Globulin 4.1 gm/dL      A/G Ratio 1.1 g/dL      BUN/Creatinine Ratio 18.9     Anion Gap 13.1 mmol/L      eGFR 54.7 mL/min/1.73     Narrative:      GFR Normal >60  Chronic Kidney Disease <60  Kidney Failure <15      CBC Auto Differential [[id number]]  (Abnormal) Collected: 08/02/23 1231    Specimen: Blood Updated: 08/02/23 1240     WBC 11.40 10*3/mm3      RBC 5.08 10*6/mm3      Hemoglobin 15.3 g/dL      Hematocrit 45.6 %      MCV 89.8 fL      MCH 30.1 pg      MCHC 33.6 g/dL      RDW 13.0 %      RDW-SD 42.6 fl      MPV 10.3 fL      Platelets 227 10*3/mm3      Neutrophil % 79.5 %      Lymphocyte % 10.6 %      Monocyte % 8.9 %      Eosinophil % 0.2 %      Basophil % 0.4 %      Immature Grans % 0.4 %      Neutrophils, Absolute 9.06 10*3/mm3      Lymphocytes, Absolute 1.21 10*3/mm3      Monocytes, Absolute 1.02 10*3/mm3      Eosinophils, Absolute 0.02 10*3/mm3      Basophils, Absolute 0.05 10*3/mm3      Immature Grans, Absolute 0.04 10*3/mm3      nRBC 0.0 /100 WBC              Imaging:    XR Chest 1 View    Result Date: 8/2/2023  PROCEDURE: XR CHEST 1 VW  COMPARISON: [facility] NICK CLAROS, CHEST PA/AP & LAT 2V, 3/07/2011, 15:36.  INDICATIONS: Mucus in trach  FINDINGS:  The heart is normal in size.  The lungs are well-expanded and free of infiltrates.  Tracheostomy device is in unchanged position.  Bony  structures appear intact.       No active disease is seen.       SAQIB CERVANTES MD       Electronically Signed and Approved By: SAQIB CERVANTES MD on 8/02/2023 at 15:25                Differential Diagnosis and Discussion:      Rash: Differential diagnosis includes but is not limited to sepsis, cellulitis, [place] Spotted Fever, meningitis, meningococcemia, Varicella, Strep infection, dermatitis, allergic reaction, Lyme disease, and toxic shock syndrome.    All labs were reviewed and interpreted by me.  All X-rays impressions were independently interpreted by me.    MDM     Amount and/or Complexity of Data Reviewed  Decide to obtain previous medical records or to obtain history from someone other than the patient: yes             Patient Care Considerations:    CT CHEST: I considered ordering a CT scan of the chest, however patient denies dyspnea, chest pain      Consultants/Shared Management Plan:    None    Social Determinants of Health:    Patient is independent, reliable, and has access to care.       Disposition and Care Coordination:    Discharged: The patient is suitable and stable for discharge with no need for consideration of observation or admission.    I have explained the patient's condition, diagnoses and treatment plan based on the information available to me at this time. I have answered questions and addressed any concerns. The patient has a good  understanding of the patient's diagnosis, condition, and treatment plan as can be expected at this point. The vital signs have been stable. The patient's condition is stable and appropriate for discharge from the emergency department.      The patient will pursue further outpatient evaluation with the primary care physician or other designated or consulting physician as outlined in the discharge instructions. They are agreeable to this plan of care and follow-up instructions have been explained in detail. The patient has received these instructions  in written format and have expressed an understanding of the discharge instructions. The patient is aware that any significant change in condition or worsening of symptoms should prompt an immediate return to this or the closest emergency department or call to 911.    Final diagnoses:   Tracheostomy care        ED Disposition       ED Disposition   Discharge    Condition   Stable    Comment   --               This medical record created using voice recognition software.             Julien Griffin MD  08/02/23 4231

## 2023-08-02 NOTE — DISCHARGE INSTRUCTIONS
Only take the antibiotic if you did not develop fever, expanding redness or puslike drainage from your tracheostomy site.

## 2023-08-02 NOTE — ED TRIAGE NOTES
Pt and hardness inferior to trach with associated H/A in occipital region x 2 days. Trach pn rated as 5/10. H/A rated as 7/10.

## 2023-08-21 ENCOUNTER — LAB (OUTPATIENT)
Dept: LAB | Facility: HOSPITAL | Age: 63
End: 2023-08-21
Payer: MEDICARE

## 2023-08-21 ENCOUNTER — TRANSCRIBE ORDERS (OUTPATIENT)
Dept: ADMINISTRATIVE | Facility: HOSPITAL | Age: 63
End: 2023-08-21
Payer: MEDICARE

## 2023-08-21 DIAGNOSIS — I10 ESSENTIAL HYPERTENSION, MALIGNANT: ICD-10-CM

## 2023-08-21 DIAGNOSIS — E78.5 HYPERLIPIDEMIA, UNSPECIFIED HYPERLIPIDEMIA TYPE: ICD-10-CM

## 2023-08-21 DIAGNOSIS — I51.9 MYXEDEMA HEART DISEASE: ICD-10-CM

## 2023-08-21 DIAGNOSIS — Z79.4 DIABETES MELLITUS TREATED WITH INSULIN AND ORAL MEDICATION: ICD-10-CM

## 2023-08-21 DIAGNOSIS — E66.3 SEVERELY OVERWEIGHT: ICD-10-CM

## 2023-08-21 DIAGNOSIS — Z79.4 ENCOUNTER FOR LONG-TERM (CURRENT) USE OF INSULIN: ICD-10-CM

## 2023-08-21 DIAGNOSIS — E11.9 DIABETES MELLITUS WITHOUT COMPLICATION: ICD-10-CM

## 2023-08-21 DIAGNOSIS — E11.9 DIABETES MELLITUS WITHOUT COMPLICATION: Primary | ICD-10-CM

## 2023-08-21 DIAGNOSIS — Z79.84 DIABETES MELLITUS TREATED WITH INSULIN AND ORAL MEDICATION: ICD-10-CM

## 2023-08-21 DIAGNOSIS — E11.9 DIABETES MELLITUS TREATED WITH INSULIN AND ORAL MEDICATION: ICD-10-CM

## 2023-08-21 DIAGNOSIS — E03.9 MYXEDEMA HEART DISEASE: ICD-10-CM

## 2023-08-21 LAB
ALBUMIN UR-MCNC: 2 MG/DL
CHOLEST SERPL-MCNC: 114 MG/DL (ref 0–200)
CREAT UR-MCNC: 395.6 MG/DL
HBA1C MFR BLD: 8 % (ref 4.8–5.6)
HDLC SERPL-MCNC: 25 MG/DL (ref 40–60)
LDLC SERPL CALC-MCNC: 61 MG/DL (ref 0–100)
LDLC/HDLC SERPL: 2.26 {RATIO}
MICROALBUMIN/CREAT UR: 5.1 MG/G
T4 FREE SERPL-MCNC: 1.33 NG/DL (ref 0.93–1.7)
TRIGL SERPL-MCNC: 162 MG/DL (ref 0–150)
TSH SERPL DL<=0.05 MIU/L-ACNC: 1.14 UIU/ML (ref 0.27–4.2)
VLDLC SERPL-MCNC: 28 MG/DL (ref 5–40)

## 2023-08-21 PROCEDURE — 80061 LIPID PANEL: CPT

## 2023-08-21 PROCEDURE — 36415 COLL VENOUS BLD VENIPUNCTURE: CPT

## 2023-08-21 PROCEDURE — 82570 ASSAY OF URINE CREATININE: CPT

## 2023-08-21 PROCEDURE — 80053 COMPREHEN METABOLIC PANEL: CPT

## 2023-08-21 PROCEDURE — 84439 ASSAY OF FREE THYROXINE: CPT

## 2023-08-21 PROCEDURE — 84443 ASSAY THYROID STIM HORMONE: CPT

## 2023-08-21 PROCEDURE — 82043 UR ALBUMIN QUANTITATIVE: CPT

## 2023-08-21 PROCEDURE — 83036 HEMOGLOBIN GLYCOSYLATED A1C: CPT

## 2023-08-21 PROCEDURE — 82607 VITAMIN B-12: CPT

## 2023-08-21 PROCEDURE — 82746 ASSAY OF FOLIC ACID SERUM: CPT

## 2023-08-22 LAB
ALBUMIN SERPL-MCNC: 4.2 G/DL (ref 3.5–5.2)
ALBUMIN/GLOB SERPL: 1.4 G/DL
ALP SERPL-CCNC: 95 U/L (ref 39–117)
ALT SERPL W P-5'-P-CCNC: 19 U/L (ref 1–41)
ANION GAP SERPL CALCULATED.3IONS-SCNC: 13.6 MMOL/L (ref 5–15)
AST SERPL-CCNC: 20 U/L (ref 1–40)
BILIRUB SERPL-MCNC: 0.3 MG/DL (ref 0–1.2)
BUN SERPL-MCNC: 42 MG/DL (ref 8–23)
BUN/CREAT SERPL: 22.6 (ref 7–25)
CALCIUM SPEC-SCNC: 9.6 MG/DL (ref 8.6–10.5)
CHLORIDE SERPL-SCNC: 97 MMOL/L (ref 98–107)
CO2 SERPL-SCNC: 26.4 MMOL/L (ref 22–29)
CREAT SERPL-MCNC: 1.86 MG/DL (ref 0.76–1.27)
EGFRCR SERPLBLD CKD-EPI 2021: 39.9 ML/MIN/1.73
FOLATE SERPL-MCNC: 10.4 NG/ML (ref 4.78–24.2)
GLOBULIN UR ELPH-MCNC: 3.1 GM/DL
GLUCOSE SERPL-MCNC: 300 MG/DL (ref 65–99)
POTASSIUM SERPL-SCNC: 5.9 MMOL/L (ref 3.5–5.2)
PROT SERPL-MCNC: 7.3 G/DL (ref 6–8.5)
SODIUM SERPL-SCNC: 137 MMOL/L (ref 136–145)
VIT B12 BLD-MCNC: 371 PG/ML (ref 211–946)

## 2023-08-29 ENCOUNTER — OFFICE VISIT (OUTPATIENT)
Dept: FAMILY MEDICINE CLINIC | Age: 63
End: 2023-08-29
Payer: MEDICARE

## 2023-08-29 VITALS
HEART RATE: 82 BPM | TEMPERATURE: 98 F | WEIGHT: 196 LBS | HEIGHT: 72 IN | BODY MASS INDEX: 26.55 KG/M2 | OXYGEN SATURATION: 94 % | SYSTOLIC BLOOD PRESSURE: 121 MMHG | DIASTOLIC BLOOD PRESSURE: 70 MMHG

## 2023-08-29 DIAGNOSIS — Z23 ENCOUNTER FOR IMMUNIZATION: ICD-10-CM

## 2023-08-29 DIAGNOSIS — N18.31 STAGE 3A CHRONIC KIDNEY DISEASE: ICD-10-CM

## 2023-08-29 DIAGNOSIS — I65.23 BILATERAL CAROTID ARTERY STENOSIS: ICD-10-CM

## 2023-08-29 DIAGNOSIS — Z79.4 TYPE 2 DIABETES MELLITUS WITH OTHER CIRCULATORY COMPLICATION, WITH LONG-TERM CURRENT USE OF INSULIN: ICD-10-CM

## 2023-08-29 DIAGNOSIS — I10 ESSENTIAL HYPERTENSION: ICD-10-CM

## 2023-08-29 DIAGNOSIS — Z00.00 PHYSICAL EXAM: Primary | ICD-10-CM

## 2023-08-29 DIAGNOSIS — Z12.5 SCREENING FOR PROSTATE CANCER: ICD-10-CM

## 2023-08-29 DIAGNOSIS — E78.2 MIXED HYPERLIPIDEMIA: ICD-10-CM

## 2023-08-29 DIAGNOSIS — E11.59 TYPE 2 DIABETES MELLITUS WITH OTHER CIRCULATORY COMPLICATION, WITH LONG-TERM CURRENT USE OF INSULIN: ICD-10-CM

## 2023-08-29 PROBLEM — E11.9 DIABETES MELLITUS: Status: ACTIVE | Noted: 2023-08-29

## 2023-08-29 RX ORDER — ATORVASTATIN CALCIUM 20 MG/1
20 TABLET, FILM COATED ORAL NIGHTLY
Qty: 90 TABLET | Refills: 3 | Status: SHIPPED | OUTPATIENT
Start: 2023-08-29

## 2023-08-29 RX ORDER — IRBESARTAN 150 MG/1
150 TABLET ORAL DAILY
Qty: 90 TABLET | Refills: 3 | Status: SHIPPED | OUTPATIENT
Start: 2023-08-29

## 2023-08-29 RX ORDER — HYDROCODONE BITARTRATE AND ACETAMINOPHEN 7.5; 325 MG/1; MG/1
TABLET ORAL
COMMUNITY

## 2023-08-29 NOTE — PROGRESS NOTES
The ABCs of the Annual Wellness Visit  Subsequent Medicare Wellness Visit    Subjective    Nicholas Givens is a 63 y.o. male who presents for a Subsequent Medicare Wellness Visit.    The following portions of the patient's history were reviewed and updated as appropriate: allergies, current medications, past family history, past medical history, past social history, past surgical history, and problem list.    Compared to one year ago, the patient feels his physical health is the same.    Compared to one year ago, the patient feels his mental health is the same.    Recent Hospitalizations:  He was not admitted to the hospital during the last year.     Current Medical Providers:  Patient Care Team:  Buck Aparicio MD as PCP - General (Family Medicine)  Dorota Marina MD as Consulting Physician (Endocrinology)  Sagar Castillo MD (Transplant Surgery)    Outpatient Medications Prior to Visit   Medication Sig Dispense Refill    amLODIPine (NORVASC) 5 MG tablet Take 1 tablet by mouth Daily.      aspirin 325 MG tablet aspirin 325 mg oral tablet take 1 tablet (325 mg) by oral route once daily   Active      glimepiride (AMARYL) 4 MG tablet Take 1 tablet by mouth 2 (two) times a day.      glucose blood (OneTouch Ultra) test strip USE 1 STRIP TO CHECK GLUCOSE 4 TIMES DAILY      hydroCHLOROthiazide (HYDRODIURIL) 12.5 MG tablet Take 1 tablet by mouth Daily.      HYDROcodone-acetaminophen (NORCO) 7.5-325 MG per tablet       insulin aspart (NovoLOG FlexPen) 100 UNIT/ML solution pen-injector sc pen Sliding scale      insulin detemir (Levemir FlexTouch) 100 UNIT/ML injection Inject 50 Units under the skin into the appropriate area as directed Every Night.      Insulin Pen Needle (NovoFine Plus) 32G X 4 MM misc USE 1 PEN NEEDLE TO INJECT VICTOZA UNDER THE SKIN ONCE DAILY      metFORMIN (GLUCOPHAGE) 500 MG tablet TAKE 1 TABLET BY MOUTH IN THE MORNING WITH BREAKFAST AND 2 TABLETS WITH SUPPER      Ozempic, 0.25 or 0.5  "MG/DOSE, 2 MG/1.5ML solution pen-injector 0.5 mg 1 (One) Time Per Week.      Synthroid 75 MCG tablet Take 1 tablet by mouth Daily.      atorvastatin (LIPITOR) 20 MG tablet Take 1 tablet by mouth Every Night. 90 tablet 3    irbesartan (AVAPRO) 150 MG tablet Take 1 tablet by mouth Daily. 90 tablet 3    traMADol (ULTRAM) 50 MG tablet Take 1 tablet by mouth Every 8 (Eight) Hours As Needed for Moderate Pain. (Patient not taking: Reported on 8/29/2023) 9 tablet 0     No facility-administered medications prior to visit.       Opioid medication/s are on active medication list.  and I have evaluated his active treatment plan and pain score trends (see table).  Vitals:    08/29/23 0856   PainSc: 0-No pain     I have reviewed the chart for potential of high risk medication and harmful drug interactions in the elderly.        Aspirin is on active medication list. Aspirin use is indicated based on review of current medical condition/s. Pros and cons of this therapy have been discussed today. Benefits of this medication outweigh potential harm.  Patient has been encouraged to continue taking this medication.      Patient Active Problem List   Diagnosis    Essential hypertension    Mixed hyperlipidemia    Bilateral carotid artery stenosis    Diabetes mellitus     Advance Care Planning  Advance Directive is not on file.  ACP discussion was held with the patient during this visit. Patient does not have an advance directive, information provided.  His daughter, Crystal, would make decisions if needed.     Objective    Vitals:    08/29/23 0856   BP: 121/70   BP Location: Right arm   Patient Position: Sitting   Cuff Size: Adult   Pulse: 82   Temp: 98 øF (36.7 øC)   TempSrc: Oral   SpO2: 94%   Weight: 88.9 kg (196 lb)   Height: 182.9 cm (72.01\")   PainSc: 0-No pain     Estimated body mass index is 26.58 kg/mý as calculated from the following:    Height as of this encounter: 182.9 cm (72.01\").    Weight as of this encounter: 88.9 kg (196 " lb).    BMI is >= 25 and <30. (Overweight) The following options were offered after discussion;: exercise counseling/recommendations and nutrition counseling/recommendations    Does the patient have evidence of cognitive impairment? No    Lab Results   Component Value Date    TRIG 162 (H) 2023    HDL 25 (L) 2023    LDL 61 2023    VLDL 28 2023    HGBA1C 8.00 (H) 2023        HEALTH RISK ASSESSMENT    Smoking Status:  Social History     Tobacco Use   Smoking Status Former    Types: Cigarettes    Quit date:     Years since quittin.6   Smokeless Tobacco Never   Tobacco Comments    quit      Alcohol Consumption:  Social History     Substance and Sexual Activity   Alcohol Use None     Fall Risk Screen:    DIOGOADI Fall Risk Assessment was completed, and patient is at LOW risk for falls.Assessment completed on:2023    Depression Screenin/29/2023     8:53 AM   PHQ-2/PHQ-9 Depression Screening   Little Interest or Pleasure in Doing Things 0-->not at all   Feeling Down, Depressed or Hopeless 0-->not at all   PHQ-9: Brief Depression Severity Measure Score 0       Health Habits and Functional and Cognitive Screenin/29/2023     8:54 AM   Functional & Cognitive Status   Do you have difficulty preparing food and eating? No   Do you have difficulty bathing yourself, getting dressed or grooming yourself? No   Do you have difficulty using the toilet? No   Do you have difficulty moving around from place to place? No   Do you have trouble with steps or getting out of a bed or a chair? No   Current Diet Well Balanced Diet   Dental Exam Up to date   Eye Exam Up to date   Exercise (times per week) 0 times per week   Current Exercises Include No Regular Exercise   Do you need help using the phone?  No   Are you deaf or do you have serious difficulty hearing?  No   Do you need help to go to places out of walking distance? No   Do you need help shopping? No   Do you need help  preparing meals?  No   Do you need help with housework?  No   Do you need help with laundry? No   Do you need help taking your medications? No   Do you need help managing money? No   Do you ever drive or ride in a car without wearing a seat belt? Yes   Have you felt unusual stress, anger or loneliness in the last month? No   Who do you live with? Alone   If you need help, do you have trouble finding someone available to you? No   Have you been bothered in the last four weeks by sexual problems? No   Do you have difficulty concentrating, remembering or making decisions? No       Age-appropriate Screening Schedule:  Refer to the list below for future screening recommendations based on patient's age, sex and/or medical conditions. Orders for these recommended tests are listed in the plan section. The patient has been provided with a written plan.    Health Maintenance   Topic Date Due    ZOSTER VACCINE (1 of 2) Never done    BMI FOLLOWUP  08/26/2023    DIABETIC FOOT EXAM  08/28/2024 (Originally 8/26/2023)    DIABETIC EYE EXAM  09/23/2023    INFLUENZA VACCINE  10/01/2023    HEMOGLOBIN A1C  02/21/2024    Pneumococcal Vaccine 0-64 (3 - PPSV23 or PCV20) 06/20/2024    LIPID PANEL  08/21/2024    URINE MICROALBUMIN  08/21/2024    ANNUAL WELLNESS VISIT  08/29/2024    COLORECTAL CANCER SCREENING  07/30/2025    TDAP/TD VACCINES (2 - Td or Tdap) 01/10/2032    HEPATITIS C SCREENING  Completed    COVID-19 Vaccine  Completed          CMS Preventative Services Quick Reference  Risk Factors Identified During Encounter  Hearing Problem:  Continue to monitor for signs of worsening.  Immunizations Discussed/Encouraged: Shingrix and COVID19  The above risks/problems have been discussed with the patient.  Pertinent information has been shared with the patient in the After Visit Summary.  An After Visit Summary and PPPS were made available to the patient.    Follow Up:   Next Medicare Wellness visit to be scheduled in 1 year.  "      Additional E&M Note during same encounter follows:  Patient has multiple medical problems which are significant and separately identifiable that require additional work above and beyond the Medicare Wellness Visit.      Chief Complaint:  Cholesterol, blood pressure    Subjective        In addition to the Medicare wellness exam, CHANEL is also here for follow-up on his usual care.  He has hypertension and elevated cholesterol for which he remains on treatments as noted.  His blood pressure is well controlled here.  He does see endocrinology regularly for diabetes control and has been doing well with this.  Also, he recently was found to have bilateral carotid artery stenoses.  He will be having upcoming surgery for this.    In addition, CHANEL was noted on recent lab testing to have elevations of his BUN, creatinine, and potassium.  He had those levels repeated yesterday, and they were significantly better.  His potassium was still just above normal.  The creatinine had dropped back to 1.38 which is more consistent with where his numbers have been.    Review of Systems:  Review of Systems   Constitutional:  Negative for chills and fever.   Respiratory:  Negative for cough and shortness of breath.    Cardiovascular:  Negative for chest pain and palpitations.   Gastrointestinal:  Negative for abdominal pain, nausea and vomiting.      Objective   Vital Signs:  /70 (BP Location: Right arm, Patient Position: Sitting, Cuff Size: Adult)   Pulse 82   Temp 98 øF (36.7 øC) (Oral)   Ht 182.9 cm (72.01\")   Wt 88.9 kg (196 lb)   SpO2 94%   BMI 26.58 kg/mý     Physical Exam  Vitals and nursing note reviewed.   Constitutional:       General: He is not in acute distress.     Appearance: He is not ill-appearing.   HENT:      Right Ear: Tympanic membrane and ear canal normal.      Left Ear: Tympanic membrane and ear canal normal.      Ears:      Comments: Hearing is decreased in the left ear with forced whisper.  Hearing in " the right ear seems relatively normal.     Mouth/Throat:      Mouth: Mucous membranes are moist.      Comments: Pharynx appears normal  Eyes:      Extraocular Movements: Extraocular movements intact.      Pupils: Pupils are equal, round, and reactive to light.      Comments: Binocular vision is 20/40 with correction.   Neck:      Thyroid: No thyromegaly.      Comments: Tracheostomy is in place  Cardiovascular:      Rate and Rhythm: Normal rate and regular rhythm.      Heart sounds: No murmur heard.  Pulmonary:      Effort: Pulmonary effort is normal.      Breath sounds: Normal breath sounds.   Abdominal:      General: There is no distension.      Palpations: Abdomen is soft. There is no mass.      Tenderness: There is no abdominal tenderness.   Musculoskeletal:      Cervical back: Normal range of motion.   Skin:     Findings: No lesion or rash.   Neurological:      General: No focal deficit present.      Mental Status: He is oriented to person, place, and time.      Cranial Nerves: No cranial nerve deficit.   Psychiatric:         Mood and Affect: Mood normal.     The following data was reviewed by Buck Aparicio MD on 08/29/2023.  Lab Results   Component Value Date    WBC 11.40 (H) 08/02/2023    HGB 15.3 08/02/2023    HCT 45.6 08/02/2023    MCV 89.8 08/02/2023     08/02/2023     Lab Results   Component Value Date    GLUCOSE 300 (H) 08/21/2023    BUN 42 (H) 08/21/2023    CREATININE 1.86 (H) 08/21/2023     08/21/2023    K 5.9 (H) 08/21/2023    CL 97 (L) 08/21/2023    CO2 26.4 08/21/2023    CALCIUM 9.6 08/21/2023    PROTEINTOT 7.3 08/21/2023    ALBUMIN 4.2 08/21/2023    ALT 19 08/21/2023    AST 20 08/21/2023    ALKPHOS 95 08/21/2023    BILITOT 0.3 08/21/2023    EGFR 39.9 (L) 08/21/2023    GLOB 3.1 08/21/2023    AGRATIO 1.4 08/21/2023    BCR 22.6 08/21/2023    ANIONGAP 13.6 08/21/2023      Lab Results   Component Value Date    CHOL 114 08/21/2023    CHLPL 116 03/08/2021    TRIG 162 (H) 08/21/2023     HDL 25 (L) 08/21/2023    LDL 61 08/21/2023     Lab Results   Component Value Date    TSH 1.140 08/21/2023     Lab Results   Component Value Date    HGBA1C 8.00 (H) 08/21/2023     Lab Results   Component Value Date    PSA 1.120 09/08/2022    PSA 1.05 04/29/2020     CTA NECK (07/17/2023 13:32)             Assessment and Plan:       Today, we have reviewed his care.  Regarding the Medicare wellness exam, CHANEL is currently up-to-date on recommended cancer screenings.  I will place an order for a PSA though for the next few weeks.  Regarding vaccines, we will move ahead with COVID-19 booster.  We discussed that a new booster will be available relatively soon, but I am concerned about his near-term risk given the tracheostomy.    Regarding his usual care, I have reviewed his recent testing as noted above.  There were concerns with his recent kidney functions and potassium level.  However, those were better on recheck yesterday and more in line with previous numbers.  For now, I have not advised any specific change with his medications.  His LDL cholesterol was also well controlled on most recent check.  Because of the carotid disease, I did ask him to speak with vascular to see if they think his current dose of atorvastatin is appropriate.  We will continue to monitor his testing and follow along with his specialist.  Tentative follow-up will be again in a year.    Diagnoses and all orders for this visit:    1. Physical (Primary)    2. Type 2 diabetes mellitus with other circulatory complication, with long-term current use of insulin  Comments:  As above.    3. Essential hypertension  -     irbesartan (AVAPRO) 150 MG tablet; Take 1 tablet by mouth Daily.  Dispense: 90 tablet; Refill: 3    4. Mixed hyperlipidemia  -     atorvastatin (LIPITOR) 20 MG tablet; Take 1 tablet by mouth Every Night.  Dispense: 90 tablet; Refill: 3    5. Bilateral carotid artery stenosis  Comments:  As above.    6. Stage 3a chronic kidney  disease  Comments:  As above.    7. Prostate Screen  -     PSA Screen; Future    8. Immunizations  -     COVID-19 (Pfizer) Bivalent 12+yrs       Follow Up   Return in about 1 year (around 8/29/2024) for Recheck, Medicare Wellness.  Patient was given instructions and counseling regarding his condition or for health maintenance advice. Please see specific information pulled into the AVS if appropriate.

## 2023-09-11 ENCOUNTER — LAB (OUTPATIENT)
Dept: LAB | Facility: HOSPITAL | Age: 63
End: 2023-09-11
Payer: MEDICARE

## 2023-09-11 ENCOUNTER — TRANSCRIBE ORDERS (OUTPATIENT)
Dept: LAB | Facility: HOSPITAL | Age: 63
End: 2023-09-11
Payer: MEDICARE

## 2023-09-11 DIAGNOSIS — I10 HYPERTENSION, ESSENTIAL: ICD-10-CM

## 2023-09-11 DIAGNOSIS — Z12.5 SCREENING FOR PROSTATE CANCER: ICD-10-CM

## 2023-09-11 DIAGNOSIS — E11.9 DIABETES MELLITUS WITHOUT COMPLICATION: ICD-10-CM

## 2023-09-11 DIAGNOSIS — I10 HYPERTENSION, ESSENTIAL: Primary | ICD-10-CM

## 2023-09-11 LAB
ALBUMIN SERPL-MCNC: 4.3 G/DL (ref 3.5–5.2)
ALBUMIN/GLOB SERPL: 1.3 G/DL
ALP SERPL-CCNC: 98 U/L (ref 39–117)
ALT SERPL W P-5'-P-CCNC: 22 U/L (ref 1–41)
ANION GAP SERPL CALCULATED.3IONS-SCNC: 7 MMOL/L (ref 5–15)
AST SERPL-CCNC: 21 U/L (ref 1–40)
BASOPHILS # BLD AUTO: 0.02 10*3/MM3 (ref 0–0.2)
BASOPHILS NFR BLD AUTO: 0.3 % (ref 0–1.5)
BILIRUB SERPL-MCNC: 0.3 MG/DL (ref 0–1.2)
BUN SERPL-MCNC: 16 MG/DL (ref 8–23)
BUN/CREAT SERPL: 14.4 (ref 7–25)
CALCIUM SPEC-SCNC: 9.6 MG/DL (ref 8.6–10.5)
CHLORIDE SERPL-SCNC: 104 MMOL/L (ref 98–107)
CO2 SERPL-SCNC: 28 MMOL/L (ref 22–29)
CREAT SERPL-MCNC: 1.11 MG/DL (ref 0.76–1.27)
DEPRECATED RDW RBC AUTO: 45.8 FL (ref 37–54)
EGFRCR SERPLBLD CKD-EPI 2021: 74.2 ML/MIN/1.73
EOSINOPHIL # BLD AUTO: 0.23 10*3/MM3 (ref 0–0.4)
EOSINOPHIL NFR BLD AUTO: 2.9 % (ref 0.3–6.2)
ERYTHROCYTE [DISTWIDTH] IN BLOOD BY AUTOMATED COUNT: 13.5 % (ref 12.3–15.4)
GLOBULIN UR ELPH-MCNC: 3.3 GM/DL
GLUCOSE SERPL-MCNC: 156 MG/DL (ref 65–99)
HCT VFR BLD AUTO: 43.6 % (ref 37.5–51)
HGB BLD-MCNC: 14.3 G/DL (ref 13–17.7)
IMM GRANULOCYTES # BLD AUTO: 0.03 10*3/MM3 (ref 0–0.05)
IMM GRANULOCYTES NFR BLD AUTO: 0.4 % (ref 0–0.5)
LYMPHOCYTES # BLD AUTO: 1.83 10*3/MM3 (ref 0.7–3.1)
LYMPHOCYTES NFR BLD AUTO: 23 % (ref 19.6–45.3)
MCH RBC QN AUTO: 29.8 PG (ref 26.6–33)
MCHC RBC AUTO-ENTMCNC: 32.8 G/DL (ref 31.5–35.7)
MCV RBC AUTO: 90.8 FL (ref 79–97)
MONOCYTES # BLD AUTO: 0.61 10*3/MM3 (ref 0.1–0.9)
MONOCYTES NFR BLD AUTO: 7.7 % (ref 5–12)
NEUTROPHILS NFR BLD AUTO: 5.25 10*3/MM3 (ref 1.7–7)
NEUTROPHILS NFR BLD AUTO: 65.7 % (ref 42.7–76)
PLATELET # BLD AUTO: 215 10*3/MM3 (ref 140–450)
PMV BLD AUTO: 10.1 FL (ref 6–12)
POTASSIUM SERPL-SCNC: 4.3 MMOL/L (ref 3.5–5.2)
PROT SERPL-MCNC: 7.6 G/DL (ref 6–8.5)
PSA SERPL-MCNC: 1.22 NG/ML (ref 0–4)
RBC # BLD AUTO: 4.8 10*6/MM3 (ref 4.14–5.8)
SODIUM SERPL-SCNC: 139 MMOL/L (ref 136–145)
WBC NRBC COR # BLD: 7.97 10*3/MM3 (ref 3.4–10.8)

## 2023-09-11 PROCEDURE — 36415 COLL VENOUS BLD VENIPUNCTURE: CPT

## 2023-09-11 PROCEDURE — 80053 COMPREHEN METABOLIC PANEL: CPT

## 2023-09-11 PROCEDURE — G0103 PSA SCREENING: HCPCS

## 2023-09-11 PROCEDURE — 85025 COMPLETE CBC W/AUTO DIFF WBC: CPT

## 2023-10-02 ENCOUNTER — OFFICE VISIT (OUTPATIENT)
Dept: FAMILY MEDICINE CLINIC | Age: 63
End: 2023-10-02
Payer: MEDICARE

## 2023-10-02 VITALS
OXYGEN SATURATION: 95 % | WEIGHT: 198 LBS | HEART RATE: 80 BPM | DIASTOLIC BLOOD PRESSURE: 51 MMHG | TEMPERATURE: 98.3 F | HEIGHT: 72 IN | BODY MASS INDEX: 26.82 KG/M2 | SYSTOLIC BLOOD PRESSURE: 93 MMHG

## 2023-10-02 DIAGNOSIS — I65.23 BILATERAL CAROTID ARTERY STENOSIS: Primary | ICD-10-CM

## 2023-10-02 PROCEDURE — 1160F RVW MEDS BY RX/DR IN RCRD: CPT | Performed by: FAMILY MEDICINE

## 2023-10-02 PROCEDURE — 3074F SYST BP LT 130 MM HG: CPT | Performed by: FAMILY MEDICINE

## 2023-10-02 PROCEDURE — 3052F HG A1C>EQUAL 8.0%<EQUAL 9.0%: CPT | Performed by: FAMILY MEDICINE

## 2023-10-02 PROCEDURE — 99213 OFFICE O/P EST LOW 20 MIN: CPT | Performed by: FAMILY MEDICINE

## 2023-10-02 PROCEDURE — 1159F MED LIST DOCD IN RCRD: CPT | Performed by: FAMILY MEDICINE

## 2023-10-02 PROCEDURE — 3078F DIAST BP <80 MM HG: CPT | Performed by: FAMILY MEDICINE

## 2023-10-02 RX ORDER — SEMAGLUTIDE 1.34 MG/ML
1 INJECTION, SOLUTION SUBCUTANEOUS WEEKLY
COMMUNITY
Start: 2023-09-19

## 2023-10-02 RX ORDER — CLOPIDOGREL BISULFATE 75 MG/1
75 TABLET ORAL DAILY
Qty: 30 TABLET | Refills: 11 | COMMUNITY
Start: 2023-09-19 | End: 2024-09-18

## 2023-10-02 RX ORDER — HYDROCODONE BITARTRATE AND ACETAMINOPHEN 5; 325 MG/1; MG/1
1 TABLET ORAL
COMMUNITY
Start: 2023-09-27

## 2023-10-02 NOTE — PROGRESS NOTES
Nicholas Givens presents to [facility].    Chief Complaint:  Follow up on carotid stenosis    Subjective   History of Present Illness:  TJ is being seen today for follow-up on his care.  He was admitted to [facility] last week for carotid stent placement.  He had that surgery done and was kept overnight and then released the following day.  He states that he has been doing okay since his discharge home.  He denies any significant issue of concern.  Specifically, he has had no bleeding.  He denies any significant redness or drainage from the site of the incision.    Review of Systems:  Review of Systems   Constitutional:  Negative for chills and fever.   Respiratory:  Negative for cough and shortness of breath.    Cardiovascular:  Negative for chest pain and palpitations.   Gastrointestinal:  Negative for abdominal pain, nausea and vomiting.      Objective   Medical History:  Past Medical History:    Acquired hypothyroidism    Bilateral carotid artery stenosis    Essential hypertension    Laryngeal cancer    Mixed hyperlipidemia    Nasal polyposis    Tobacco abuse    Tracheostomy present    Type 2 diabetes mellitus     Past Surgical History:    CAROTID STENT    COLONOSCOPY    Serrated adenoma    CORONARY STENT PLACEMENT    KNEE SURGERY    NASAL POLYP SURGERY    TRACHEOSTOMY      Family History   Problem Relation Age of Onset    Hypertension Mother     Diabetes type II Mother     Heart attack Father     Hypertension Father     Asthma Sister     Coronary artery disease Brother      Social History     Tobacco Use    Smoking status: Former     Types: Cigarettes     Quit date:      Years since quittin.7    Smokeless tobacco: Never    Tobacco comments:     quit 2007   Substance Use Topics    Alcohol use: Not on file       Health Maintenance Due   Topic Date Due    ZOSTER VACCINE (1 of 2) Never done    INFLUENZA VACCINE  2023    DIABETIC EYE EXAM  2023         Immunization History   Administered Date(s) Administered    COVID-19 (MODERNA) 1st,2nd,3rd Dose Monovalent 03/30/2021, 04/27/2021    COVID-19 (MODERNA) Monovalent Original Booster 11/10/2021    COVID-19 (PFIZER) BIVALENT 12+YRS 08/29/2023    Fluzone (or Fluarix & Flulaval for VFC) >6mos 10/18/2021, 11/04/2022    Influenza Quad Vaccine (Inpatient) 10/25/2013    Pneumococcal Conjugate 13-Valent (PCV13) 08/12/2015    Pneumococcal Polysaccharide (PPSV23) 12/07/2010    Tdap 01/10/2022       No Known Allergies     Medications:  Current Outpatient Medications on File Prior to Visit   Medication Sig    amLODIPine (NORVASC) 5 MG tablet Take 1 tablet by mouth Daily.    aspirin 325 MG tablet aspirin 325 mg oral tablet take 1 tablet (325 mg) by oral route once daily   Active    atorvastatin (LIPITOR) 20 MG tablet Take 1 tablet by mouth Every Night.    clopidogrel (PLAVIX) 75 MG tablet Take 1 tablet by mouth Daily.    glimepiride (AMARYL) 4 MG tablet Take 1 tablet by mouth 2 (two) times a day.    glucose blood (OneTouch Ultra) test strip USE 1 STRIP TO CHECK GLUCOSE 4 TIMES DAILY    hydroCHLOROthiazide (HYDRODIURIL) 12.5 MG tablet Take 1 tablet by mouth Daily.    HYDROcodone-acetaminophen (NORCO) 5-325 MG per tablet Take 1 tablet by mouth Every 4 (Four) to 6 (Six) Hours As Needed for Pain.    insulin aspart (NovoLOG FlexPen) 100 UNIT/ML solution pen-injector sc pen Sliding scale    Insulin Degludec (TRESIBA FLEXTOUCH) 200 UNIT/ML solution pen-injector pen injection Inject 10 Units under the skin into the appropriate area as directed Daily.    insulin detemir (Levemir FlexTouch) 100 UNIT/ML injection Inject 50 Units under the skin into the appropriate area as directed Every Night.    Insulin Pen Needle (NovoFine Plus) 32G X 4 MM misc USE 1 PEN NEEDLE TO INJECT VICTOZA UNDER THE SKIN ONCE DAILY    irbesartan (AVAPRO) 150 MG tablet Take 1 tablet by mouth Daily.    metFORMIN (GLUCOPHAGE) 500 MG tablet TAKE 1 TABLET BY MOUTH IN  "THE MORNING WITH BREAKFAST AND 2 TABLETS WITH SUPPER    Ozempic, 1 MG/DOSE, 4 MG/3ML solution pen-injector Inject 1 mg under the skin into the appropriate area as directed 1 (One) Time Per Week.    Synthroid 75 MCG tablet Take 1 tablet by mouth Daily.    [DISCONTINUED] HYDROcodone-acetaminophen (NORCO) 7.5-325 MG per tablet     [DISCONTINUED] Ozempic, 0.25 or 0.5 MG/DOSE, 2 MG/1.5ML solution pen-injector 0.5 mg 1 (One) Time Per Week.    [DISCONTINUED] simvastatin (ZOCOR) 40 MG tablet Take 1 tablet by mouth Daily.     No current facility-administered medications on file prior to visit.       Vital Signs:   BP 93/51 (BP Location: Left arm, Patient Position: Sitting)   Pulse 80   Temp 98.3 °F (36.8 °C) (Oral)   Ht 182.9 cm (72.01\")   Wt 89.8 kg (198 lb)   SpO2 95%   BMI 26.85 kg/m²       Physical Exam:  Physical Exam  Vitals reviewed.   Constitutional:       General: He is not in acute distress.     Appearance: He is not ill-appearing.   Eyes:      Pupils: Pupils are equal, round, and reactive to light.   Neck:      Comments: No thyromegaly  Cardiovascular:      Rate and Rhythm: Normal rate and regular rhythm.   Pulmonary:      Effort: Pulmonary effort is normal.      Breath sounds: Normal breath sounds.   Abdominal:      General: There is no distension.      Palpations: Abdomen is soft.      Tenderness: There is no abdominal tenderness.   Musculoskeletal:      Cervical back: Neck supple.   Lymphadenopathy:      Cervical: No cervical adenopathy.   Skin:     Findings: No lesion or rash.      Comments: The site of the incision in the left upper chest is clean, dry, and intact.  There is no redness or drainage.   Neurological:      Mental Status: He is alert.       Result Review   The following data was reviewed by Buck Aparicio MD on 10/02/2023.  Lab Results   Component Value Date    WBC 7.97 09/11/2023    HGB 14.3 09/11/2023    HCT 43.6 09/11/2023    MCV 90.8 09/11/2023     09/11/2023     Lab " Results   Component Value Date    GLUCOSE 156 (H) 09/11/2023    BUN 16 09/11/2023    CREATININE 1.11 09/11/2023     09/11/2023    K 4.3 09/11/2023     09/11/2023    CO2 28.0 09/11/2023    CALCIUM 9.6 09/11/2023    PROTEINTOT 7.6 09/11/2023    ALBUMIN 4.3 09/11/2023    ALT 22 09/11/2023    AST 21 09/11/2023    ALKPHOS 98 09/11/2023    BILITOT 0.3 09/11/2023    EGFR 74.2 09/11/2023    GLOB 3.3 09/11/2023    AGRATIO 1.3 09/11/2023    BCR 14.4 09/11/2023    ANIONGAP 7.0 09/11/2023      Lab Results   Component Value Date    CHOL 114 08/21/2023    CHLPL 116 03/08/2021    TRIG 162 (H) 08/21/2023    HDL 25 (L) 08/21/2023    LDL 61 08/21/2023     Lab Results   Component Value Date    TSH 1.140 08/21/2023     Lab Results   Component Value Date    HGBA1C 8.00 (H) 08/21/2023     Lab Results   Component Value Date    PSA 1.220 09/11/2023    PSA 1.120 09/08/2022    PSA 1.05 04/29/2020     DISCHARGE SUMMARY - SCAN - DISCHARGE-FACE SHEET, DEDRA FORD, 9/26/23 (09/26/2023)          Assessment and Plan:   Today, we have reviewed his care.  TJ seems to be doing well overall.  There is no sign of incision infection.  Neurologically, he remains intact.  There is no specific change in his care I would recommend.  He will follow-up with his surgeon as scheduled next week.    Diagnoses and all orders for this visit:    1. Bilateral carotid artery stenosis (Primary)  Comments:  As above.    Follow Up  Return in about 11 months (around 9/6/2024) for Recheck as scheduled.  Patient was given instructions and counseling regarding his condition or for health maintenance advice. Please see specific information pulled into the AVS if appropriate.

## 2023-10-29 DIAGNOSIS — E78.2 MIXED HYPERLIPIDEMIA: ICD-10-CM

## 2023-10-30 RX ORDER — ATORVASTATIN CALCIUM 20 MG/1
20 TABLET, FILM COATED ORAL NIGHTLY
Qty: 90 TABLET | Refills: 0 | Status: SHIPPED | OUTPATIENT
Start: 2023-10-30

## 2023-10-31 ENCOUNTER — CLINICAL SUPPORT (OUTPATIENT)
Dept: FAMILY MEDICINE CLINIC | Age: 63
End: 2023-10-31
Payer: MEDICARE

## 2023-10-31 DIAGNOSIS — Z23 IMMUNIZATION DUE: Primary | ICD-10-CM

## 2023-10-31 PROCEDURE — 90686 IIV4 VACC NO PRSV 0.5 ML IM: CPT | Performed by: FAMILY MEDICINE

## 2023-10-31 PROCEDURE — G0008 ADMIN INFLUENZA VIRUS VAC: HCPCS | Performed by: FAMILY MEDICINE

## 2024-02-21 ENCOUNTER — LAB (OUTPATIENT)
Dept: LAB | Facility: HOSPITAL | Age: 64
End: 2024-02-21
Payer: MEDICARE

## 2024-02-21 ENCOUNTER — TRANSCRIBE ORDERS (OUTPATIENT)
Dept: ADMINISTRATIVE | Facility: HOSPITAL | Age: 64
End: 2024-02-21
Payer: MEDICARE

## 2024-02-21 DIAGNOSIS — E11.9 DIABETES MELLITUS WITHOUT COMPLICATION: ICD-10-CM

## 2024-02-21 DIAGNOSIS — I10 HYPERTENSION, ESSENTIAL: ICD-10-CM

## 2024-02-21 DIAGNOSIS — E11.9 DIABETES MELLITUS WITHOUT COMPLICATION: Primary | ICD-10-CM

## 2024-02-21 LAB
ALBUMIN SERPL-MCNC: 3.9 G/DL (ref 3.5–5.2)
ALBUMIN/GLOB SERPL: 1.1 G/DL
ALP SERPL-CCNC: 98 U/L (ref 39–117)
ALT SERPL W P-5'-P-CCNC: 17 U/L (ref 1–41)
ANION GAP SERPL CALCULATED.3IONS-SCNC: 12.6 MMOL/L (ref 5–15)
AST SERPL-CCNC: 18 U/L (ref 1–40)
BILIRUB SERPL-MCNC: 0.2 MG/DL (ref 0–1.2)
BUN SERPL-MCNC: 22 MG/DL (ref 8–23)
BUN/CREAT SERPL: 15 (ref 7–25)
CALCIUM SPEC-SCNC: 9.1 MG/DL (ref 8.6–10.5)
CHLORIDE SERPL-SCNC: 101 MMOL/L (ref 98–107)
CO2 SERPL-SCNC: 24.4 MMOL/L (ref 22–29)
CREAT SERPL-MCNC: 1.47 MG/DL (ref 0.76–1.27)
EGFRCR SERPLBLD CKD-EPI 2021: 52.9 ML/MIN/1.73
GLOBULIN UR ELPH-MCNC: 3.6 GM/DL
GLUCOSE SERPL-MCNC: 217 MG/DL (ref 65–99)
HBA1C MFR BLD: 7.7 % (ref 4.8–5.6)
POTASSIUM SERPL-SCNC: 4.9 MMOL/L (ref 3.5–5.2)
PROT SERPL-MCNC: 7.5 G/DL (ref 6–8.5)
SODIUM SERPL-SCNC: 138 MMOL/L (ref 136–145)

## 2024-02-21 PROCEDURE — 36415 COLL VENOUS BLD VENIPUNCTURE: CPT

## 2024-02-21 PROCEDURE — 80053 COMPREHEN METABOLIC PANEL: CPT

## 2024-02-21 PROCEDURE — 83036 HEMOGLOBIN GLYCOSYLATED A1C: CPT

## 2024-05-31 ENCOUNTER — TRANSCRIBE ORDERS (OUTPATIENT)
Dept: ADMINISTRATIVE | Facility: HOSPITAL | Age: 64
End: 2024-05-31
Payer: MEDICARE

## 2024-05-31 ENCOUNTER — LAB (OUTPATIENT)
Dept: LAB | Facility: HOSPITAL | Age: 64
End: 2024-05-31
Payer: MEDICARE

## 2024-05-31 DIAGNOSIS — E78.5 HYPERLIPIDEMIA, UNSPECIFIED HYPERLIPIDEMIA TYPE: ICD-10-CM

## 2024-05-31 DIAGNOSIS — Z93.0 TRACHEOSTOMY PRESENT: ICD-10-CM

## 2024-05-31 DIAGNOSIS — I10 HYPERTENSION, ESSENTIAL: ICD-10-CM

## 2024-05-31 DIAGNOSIS — Z79.84 LONG TERM CURRENT USE OF ORAL HYPOGLYCEMIC DRUG: ICD-10-CM

## 2024-05-31 DIAGNOSIS — E66.3 OVER WEIGHT: ICD-10-CM

## 2024-05-31 DIAGNOSIS — E03.9 HYPOTHYROIDISM, UNSPECIFIED TYPE: ICD-10-CM

## 2024-05-31 DIAGNOSIS — E11.9 DIABETES MELLITUS WITHOUT COMPLICATION: Primary | ICD-10-CM

## 2024-05-31 DIAGNOSIS — E11.9 DIABETES MELLITUS WITHOUT COMPLICATION: ICD-10-CM

## 2024-05-31 LAB
ALBUMIN SERPL-MCNC: 4.1 G/DL (ref 3.5–5.2)
ALBUMIN/GLOB SERPL: 1.2 G/DL
ALP SERPL-CCNC: 102 U/L (ref 39–117)
ALT SERPL W P-5'-P-CCNC: 15 U/L (ref 1–41)
ANION GAP SERPL CALCULATED.3IONS-SCNC: 9.2 MMOL/L (ref 5–15)
AST SERPL-CCNC: 19 U/L (ref 1–40)
BILIRUB SERPL-MCNC: 0.3 MG/DL (ref 0–1.2)
BUN SERPL-MCNC: 15 MG/DL (ref 8–23)
BUN/CREAT SERPL: 11.6 (ref 7–25)
CALCIUM SPEC-SCNC: 9.2 MG/DL (ref 8.6–10.5)
CHLORIDE SERPL-SCNC: 102 MMOL/L (ref 98–107)
CHOLEST SERPL-MCNC: 106 MG/DL (ref 0–200)
CO2 SERPL-SCNC: 26.8 MMOL/L (ref 22–29)
CREAT SERPL-MCNC: 1.29 MG/DL (ref 0.76–1.27)
EGFRCR SERPLBLD CKD-EPI 2021: 61.9 ML/MIN/1.73
GLOBULIN UR ELPH-MCNC: 3.4 GM/DL
GLUCOSE SERPL-MCNC: 206 MG/DL (ref 65–99)
HBA1C MFR BLD: 7.3 % (ref 4.8–5.6)
HDLC SERPL-MCNC: 26 MG/DL (ref 40–60)
LDLC SERPL CALC-MCNC: 59 MG/DL (ref 0–100)
LDLC/HDLC SERPL: 2.19 {RATIO}
POTASSIUM SERPL-SCNC: 4.3 MMOL/L (ref 3.5–5.2)
PROT SERPL-MCNC: 7.5 G/DL (ref 6–8.5)
SODIUM SERPL-SCNC: 138 MMOL/L (ref 136–145)
T4 FREE SERPL-MCNC: 1.34 NG/DL (ref 0.92–1.68)
TRIGL SERPL-MCNC: 115 MG/DL (ref 0–150)
TSH SERPL DL<=0.05 MIU/L-ACNC: 4.78 UIU/ML (ref 0.27–4.2)
VLDLC SERPL-MCNC: 21 MG/DL (ref 5–40)

## 2024-05-31 PROCEDURE — 84439 ASSAY OF FREE THYROXINE: CPT

## 2024-05-31 PROCEDURE — 80061 LIPID PANEL: CPT

## 2024-05-31 PROCEDURE — 83036 HEMOGLOBIN GLYCOSYLATED A1C: CPT

## 2024-05-31 PROCEDURE — 84443 ASSAY THYROID STIM HORMONE: CPT

## 2024-05-31 PROCEDURE — 36415 COLL VENOUS BLD VENIPUNCTURE: CPT

## 2024-05-31 PROCEDURE — 80053 COMPREHEN METABOLIC PANEL: CPT

## 2024-08-29 ENCOUNTER — TRANSCRIBE ORDERS (OUTPATIENT)
Dept: ADMINISTRATIVE | Facility: HOSPITAL | Age: 64
End: 2024-08-29
Payer: MEDICARE

## 2024-08-29 ENCOUNTER — LAB (OUTPATIENT)
Dept: LAB | Facility: HOSPITAL | Age: 64
End: 2024-08-29
Payer: MEDICARE

## 2024-08-29 DIAGNOSIS — E03.9 HYPOTHYROIDISM, ADULT: ICD-10-CM

## 2024-08-29 DIAGNOSIS — E66.3 OVER WEIGHT: ICD-10-CM

## 2024-08-29 DIAGNOSIS — E78.5 HYPERLIPIDEMIA, UNSPECIFIED HYPERLIPIDEMIA TYPE: ICD-10-CM

## 2024-08-29 DIAGNOSIS — Z79.84 LONG TERM CURRENT USE OF ORAL HYPOGLYCEMIC DRUG: ICD-10-CM

## 2024-08-29 DIAGNOSIS — E11.9 DIABETES MELLITUS WITHOUT COMPLICATION: ICD-10-CM

## 2024-08-29 DIAGNOSIS — Z79.4 ENCOUNTER FOR LONG-TERM (CURRENT) USE OF INSULIN: ICD-10-CM

## 2024-08-29 DIAGNOSIS — I10 ESSENTIAL HYPERTENSION, MALIGNANT: ICD-10-CM

## 2024-08-29 DIAGNOSIS — E11.9 DIABETES MELLITUS WITHOUT COMPLICATION: Primary | ICD-10-CM

## 2024-08-29 LAB
ALBUMIN SERPL-MCNC: 4.1 G/DL (ref 3.5–5.2)
ALBUMIN UR-MCNC: <1.2 MG/DL
ALBUMIN/GLOB SERPL: 1.2 G/DL
ALP SERPL-CCNC: 100 U/L (ref 39–117)
ALT SERPL W P-5'-P-CCNC: 23 U/L (ref 1–41)
ANION GAP SERPL CALCULATED.3IONS-SCNC: 9 MMOL/L (ref 5–15)
AST SERPL-CCNC: 21 U/L (ref 1–40)
BILIRUB SERPL-MCNC: 0.2 MG/DL (ref 0–1.2)
BUN SERPL-MCNC: 20 MG/DL (ref 8–23)
BUN/CREAT SERPL: 14.1 (ref 7–25)
CALCIUM SPEC-SCNC: 9.4 MG/DL (ref 8.6–10.5)
CHLORIDE SERPL-SCNC: 103 MMOL/L (ref 98–107)
CO2 SERPL-SCNC: 27 MMOL/L (ref 22–29)
CREAT SERPL-MCNC: 1.42 MG/DL (ref 0.76–1.27)
CREAT UR-MCNC: 81.5 MG/DL
EGFRCR SERPLBLD CKD-EPI 2021: 54.8 ML/MIN/1.73
FOLATE SERPL-MCNC: >20 NG/ML (ref 4.78–24.2)
GLOBULIN UR ELPH-MCNC: 3.3 GM/DL
GLUCOSE SERPL-MCNC: 267 MG/DL (ref 65–99)
HBA1C MFR BLD: 7.8 % (ref 4.8–5.6)
MICROALBUMIN/CREAT UR: NORMAL MG/G{CREAT}
POTASSIUM SERPL-SCNC: 5.1 MMOL/L (ref 3.5–5.2)
PROT SERPL-MCNC: 7.4 G/DL (ref 6–8.5)
SODIUM SERPL-SCNC: 139 MMOL/L (ref 136–145)
T4 FREE SERPL-MCNC: 1.27 NG/DL (ref 0.92–1.68)
TSH SERPL DL<=0.05 MIU/L-ACNC: 1.75 UIU/ML (ref 0.27–4.2)
VIT B12 BLD-MCNC: 511 PG/ML (ref 211–946)

## 2024-08-29 PROCEDURE — 82607 VITAMIN B-12: CPT

## 2024-08-29 PROCEDURE — 80053 COMPREHEN METABOLIC PANEL: CPT

## 2024-08-29 PROCEDURE — 84439 ASSAY OF FREE THYROXINE: CPT

## 2024-08-29 PROCEDURE — 82746 ASSAY OF FOLIC ACID SERUM: CPT

## 2024-08-29 PROCEDURE — 82570 ASSAY OF URINE CREATININE: CPT

## 2024-08-29 PROCEDURE — 82043 UR ALBUMIN QUANTITATIVE: CPT

## 2024-08-29 PROCEDURE — 84443 ASSAY THYROID STIM HORMONE: CPT

## 2024-08-29 PROCEDURE — 83036 HEMOGLOBIN GLYCOSYLATED A1C: CPT

## 2024-08-29 PROCEDURE — 36415 COLL VENOUS BLD VENIPUNCTURE: CPT

## 2024-09-06 ENCOUNTER — OFFICE VISIT (OUTPATIENT)
Dept: FAMILY MEDICINE CLINIC | Age: 64
End: 2024-09-06
Payer: MEDICARE

## 2024-09-06 VITALS
DIASTOLIC BLOOD PRESSURE: 63 MMHG | HEART RATE: 96 BPM | SYSTOLIC BLOOD PRESSURE: 127 MMHG | OXYGEN SATURATION: 100 % | WEIGHT: 191 LBS | TEMPERATURE: 98 F | BODY MASS INDEX: 25.87 KG/M2 | HEIGHT: 72 IN

## 2024-09-06 DIAGNOSIS — R01.1 HEART MURMUR: ICD-10-CM

## 2024-09-06 DIAGNOSIS — Z00.00 PHYSICAL EXAM: Primary | ICD-10-CM

## 2024-09-06 DIAGNOSIS — E78.2 MIXED HYPERLIPIDEMIA: ICD-10-CM

## 2024-09-06 DIAGNOSIS — I10 ESSENTIAL HYPERTENSION: ICD-10-CM

## 2024-09-06 RX ORDER — ATORVASTATIN CALCIUM 20 MG/1
20 TABLET, FILM COATED ORAL NIGHTLY
Qty: 90 TABLET | Refills: 3 | Status: SHIPPED | OUTPATIENT
Start: 2024-09-06

## 2024-09-06 RX ORDER — IRBESARTAN 300 MG/1
1 TABLET ORAL DAILY
COMMUNITY
Start: 2024-08-19

## 2024-09-06 RX ORDER — EMPAGLIFLOZIN 10 MG/1
10 TABLET, FILM COATED ORAL DAILY
COMMUNITY

## 2024-09-06 NOTE — Clinical Note
Please TICKLE to call him after 10/31/2024 for high-dose flu shot and to consider COVID-19 booster.  He might end up declining the COVID booster.  Thanks.

## 2024-09-06 NOTE — PROGRESS NOTES
The ABCs of the Annual Wellness Visit  Subsequent Medicare Wellness Visit    Subjective    Nicholas Givens is a 64 y.o. patient who presents for a Subsequent Medicare Wellness Visit.    The following portions of the patient's history were reviewed and updated as appropriate: allergies, current medications, past family history, past medical history, past social history, past surgical history, and problem list.    Compared to one year ago, the patient feels his physical health is better.    Compared to one year ago, the patient feels his mental health is the same.    Recent Hospitalizations:  He was not admitted to the hospital during the last year.     Current Medical Providers:  Patient Care Team:  Buck Aparicio MD as PCP - General (Family Medicine)  Dorota Marina MD as Consulting Physician (Endocrinology)  Sagar Castillo MD (Transplant Surgery)    Outpatient Medications Prior to Visit   Medication Sig Dispense Refill    amLODIPine (NORVASC) 5 MG tablet Take 1 tablet by mouth Daily.      aspirin 325 MG tablet aspirin 325 mg oral tablet take 1 tablet (325 mg) by oral route once daily   Active      clopidogrel (PLAVIX) 75 MG tablet Take 1 tablet by mouth Daily. 30 tablet 11    glimepiride (AMARYL) 4 MG tablet Take 1 tablet by mouth 2 (two) times a day.      glucose blood (OneTouch Ultra) test strip USE 1 STRIP TO CHECK GLUCOSE 4 TIMES DAILY      hydroCHLOROthiazide (HYDRODIURIL) 12.5 MG tablet Take 1 tablet by mouth Daily.      HYDROcodone-acetaminophen (NORCO) 5-325 MG per tablet Take 1 tablet by mouth Every 4 (Four) to 6 (Six) Hours As Needed for Pain.      Insulin Pen Needle (NovoFine Plus) 32G X 4 MM misc USE 1 PEN NEEDLE TO INJECT VICTOZA UNDER THE SKIN ONCE DAILY      irbesartan (AVAPRO) 300 MG tablet Take 1 tablet by mouth Daily.      Jardiance 10 MG tablet tablet Take 1 tablet by mouth Daily.      metFORMIN (GLUCOPHAGE) 500 MG tablet TAKE 1 TABLET BY MOUTH IN THE MORNING WITH BREAKFAST AND  2 TABLETS WITH SUPPER      Ozempic, 1 MG/DOSE, 4 MG/3ML solution pen-injector Inject 1 mg under the skin into the appropriate area as directed 1 (One) Time Per Week.      Synthroid 75 MCG tablet Take 1 tablet by mouth Daily.      atorvastatin (LIPITOR) 20 MG tablet TAKE 1 TABLET BY MOUTH ONCE DAILY AT NIGHT 90 tablet 0    insulin aspart (NovoLOG FlexPen) 100 UNIT/ML solution pen-injector sc pen Sliding scale (Patient not taking: Reported on 9/6/2024)      Insulin Degludec (TRESIBA FLEXTOUCH) 200 UNIT/ML solution pen-injector pen injection Inject 10 Units under the skin into the appropriate area as directed Daily. (Patient not taking: Reported on 9/6/2024)      irbesartan (AVAPRO) 150 MG tablet Take 1 tablet by mouth Daily. 90 tablet 3     No facility-administered medications prior to visit.       Opioid medication/s are on active medication list.  and I have evaluated his active treatment plan and pain score trends (see table).  Vitals:    09/06/24 0856   PainSc: 0-No pain     I have reviewed the chart for potential of high risk medication and harmful drug interactions in the elderly.        Aspirin is on active medication list. Aspirin use is indicated based on review of current medical condition/s. Pros and cons of this therapy have been discussed today. Benefits of this medication outweigh potential harm.  Patient has been encouraged to continue taking this medication.      Patient Active Problem List   Diagnosis    Essential hypertension    Mixed hyperlipidemia    Bilateral carotid artery stenosis    Diabetes mellitus     Advance Care Planning  Advance Directive is not on file.  ACP discussion was held with the patient during this visit. Patient does not have an advance directive, information provided.  His daughter, Crystal, would make decisions if needed.     Objective    Vitals:    09/06/24 0856 09/06/24 0941   BP: 146/62 127/63   BP Location: Left arm Left arm   Patient Position: Sitting Sitting   Pulse: 100 96  "  Temp: 98 °F (36.7 °C)    TempSrc: Oral    SpO2: 100%    Weight: 86.6 kg (191 lb)    Height: 182.9 cm (72.01\")    PainSc: 0-No pain      Estimated body mass index is 25.9 kg/m² as calculated from the following:    Height as of this encounter: 182.9 cm (72.01\").    Weight as of this encounter: 86.6 kg (191 lb).    BMI is >= 25 and <30. (Overweight) The following options were offered after discussion;: exercise counseling/recommendations and nutrition counseling/recommendations    Does the patient have evidence of cognitive impairment? No     HEALTH RISK ASSESSMENT    Smoking Status:  Social History     Tobacco Use   Smoking Status Former    Current packs/day: 0.00    Types: Cigarettes    Quit date:     Years since quittin.6   Smokeless Tobacco Never   Tobacco Comments    quit      Alcohol Consumption:  Social History     Substance and Sexual Activity   Alcohol Use None     Fall Risk Screen:    STEADI Fall Risk Assessment was completed, and patient is at LOW risk for falls.Assessment completed on:2024    Depression Screenin/6/2024     8:55 AM   PHQ-2/PHQ-9 Depression Screening   Little Interest or Pleasure in Doing Things 0-->not at all   Feeling Down, Depressed or Hopeless 0-->not at all   PHQ-9: Brief Depression Severity Measure Score 0       Health Habits and Functional and Cognitive Screenin/6/2024     8:56 AM   Functional & Cognitive Status   Do you have difficulty preparing food and eating? No   Do you have difficulty bathing yourself, getting dressed or grooming yourself? No   Do you have difficulty using the toilet? No   Do you have difficulty moving around from place to place? No   Do you have trouble with steps or getting out of a bed or a chair? No   Current Diet Well Balanced Diet   Dental Exam Up to date   Eye Exam Up to date   Exercise (times per week) 0 times per week   Current Exercises Include No Regular Exercise   Do you need help using the phone?  No   Are you " deaf or do you have serious difficulty hearing?  No   Do you need help to go to places out of walking distance? No   Do you need help shopping? No   Do you need help preparing meals?  No   Do you need help with housework?  No   Do you need help with laundry? No   Do you need help taking your medications? No   Do you need help managing money? No   Do you ever drive or ride in a car without wearing a seat belt? Yes   Have you felt unusual stress, anger or loneliness in the last month? No   Who do you live with? Alone   If you need help, do you have trouble finding someone available to you? No   Have you been bothered in the last four weeks by sexual problems? No   Do you have difficulty concentrating, remembering or making decisions? No       Age-appropriate Screening Schedule:  Refer to the list below for future screening recommendations based on patient's age, sex and/or medical conditions. Orders for these recommended tests are listed in the plan section. The patient has been provided with a written plan.    Health Maintenance   Topic Date Due    ZOSTER VACCINE (1 of 2) Never done    COVID-19 Vaccine (5 - 2023-24 season) 09/01/2024    INFLUENZA VACCINE  08/01/2024    Pneumococcal Vaccine 65+ (3 of 3 - PPSV23 or PCV20) 01/01/2025 (Originally 6/20/2024)    HEMOGLOBIN A1C  02/28/2025    DIABETIC EYE EXAM  02/28/2025    LIPID PANEL  05/31/2025    COLORECTAL CANCER SCREENING  07/30/2025    URINE MICROALBUMIN  08/29/2025    ANNUAL WELLNESS VISIT  09/06/2025    DIABETIC FOOT EXAM  09/06/2025    BMI FOLLOWUP  09/06/2025    TDAP/TD VACCINES (2 - Td or Tdap) 01/10/2032    HEPATITIS C SCREENING  Completed    AAA SCREEN (ONE-TIME)  Completed          CMS Preventative Services Quick Reference  Risk Factors Identified During Encounter  Immunizations Discussed/Encouraged: Influenza, Prevnar 20 (Pneumococcal 20-valent conjugate), Shingrix, and COVID19  The above risks/problems have been discussed with the patient.  Pertinent  "information has been shared with the patient in the After Visit Summary.  An After Visit Summary and PPPS were made available to the patient.    Follow Up:   Next Medicare Wellness visit to be scheduled in 1 year.     Additional E&M Note during same encounter follows:  Patient has multiple medical problems which are significant and separately identifiable that require additional work above and beyond the Medicare Wellness Visit.      Chief Complaint:  Cholesterol    Subjective    History of Present Illness:  In addition to the Medicare wellness exam, CHANEL is also here for follow-up on his usual care.  He has hyperlipidemia for which he remains on atorvastatin.  He is not aware of obvious side effects from the cholesterol-lowering medication.  His most recent lipid panel was in May of this year.  His LDL cholesterol was at goal at that time.    He also has hypertension, known vascular disease, and type 2 diabetes.  He does see specialist in part for these issues.    Review of Systems:  Review of Systems   Constitutional:  Negative for chills and fever.   Respiratory:  Negative for cough and shortness of breath.    Cardiovascular:  Negative for chest pain and palpitations.   Gastrointestinal:  Negative for abdominal pain, nausea and vomiting.      Objective   Vital Signs:  Vitals:    09/06/24 0856 09/06/24 0941   BP: 146/62 127/63   BP Location: Left arm Left arm   Patient Position: Sitting Sitting   Pulse: 100 96   Temp: 98 °F (36.7 °C)    TempSrc: Oral    SpO2: 100%    Weight: 86.6 kg (191 lb)    Height: 182.9 cm (72.01\")    PainSc: 0-No pain    Body mass index is 25.9 kg/m².    Physical Exam  Vitals and nursing note reviewed.   Constitutional:       General: He is not in acute distress.     Appearance: He is not ill-appearing.   HENT:      Right Ear: Tympanic membrane and ear canal normal.      Left Ear: Tympanic membrane and ear canal normal.      Ears:      Comments: Hearing is normal with forced whisper " bilaterally.     Mouth/Throat:      Mouth: Mucous membranes are moist.      Comments: Pharynx appears normal  Eyes:      Extraocular Movements: Extraocular movements intact.      Pupils: Pupils are equal, round, and reactive to light.      Comments: Binocular vision is 20/20 with correction.   Neck:      Thyroid: No thyromegaly.      Comments: No thyromegaly  Cardiovascular:      Rate and Rhythm: Normal rate and regular rhythm.      Pulses:           Dorsalis pedis pulses are 0 on the right side and 2+ on the left side.      Heart sounds: Murmur heard.   Pulmonary:      Effort: Pulmonary effort is normal.      Breath sounds: Normal breath sounds.   Abdominal:      General: There is no distension.      Palpations: Abdomen is soft. There is no mass.      Tenderness: There is no abdominal tenderness.   Musculoskeletal:      Cervical back: Normal range of motion and neck supple.   Feet:      Right foot:      Protective Sensation: 3 sites tested.  3 sites sensed.      Skin integrity: Skin integrity normal.      Left foot:      Protective Sensation: 3 sites tested.  3 sites sensed.      Skin integrity: Skin integrity normal.   Lymphadenopathy:      Cervical: No cervical adenopathy.   Skin:     Findings: No lesion or rash.   Neurological:      General: No focal deficit present.      Mental Status: He is alert and oriented to person, place, and time.      Cranial Nerves: No cranial nerve deficit.   Psychiatric:         Mood and Affect: Mood normal.       The following data was reviewed by Buck Aparicio MD on 09/06/2024.  Lab Results   Component Value Date    WBC 7.97 09/11/2023    HGB 14.3 09/11/2023    HCT 43.6 09/11/2023    MCV 90.8 09/11/2023     09/11/2023     Lab Results   Component Value Date    GLUCOSE 267 (H) 08/29/2024    BUN 20 08/29/2024    CREATININE 1.42 (H) 08/29/2024     08/29/2024    K 5.1 08/29/2024     08/29/2024    CO2 27.0 08/29/2024    CALCIUM 9.4 08/29/2024    PROTEINTOT 7.4  08/29/2024    ALBUMIN 4.1 08/29/2024    ALT 23 08/29/2024    AST 21 08/29/2024    ALKPHOS 100 08/29/2024    BILITOT 0.2 08/29/2024    EGFR 54.8 (L) 08/29/2024    GLOB 3.3 08/29/2024    AGRATIO 1.2 08/29/2024    BCR 14.1 08/29/2024    ANIONGAP 9.0 08/29/2024      Lab Results   Component Value Date    CHOL 106 05/31/2024    CHLPL 116 03/08/2021    TRIG 115 05/31/2024    HDL 26 (L) 05/31/2024    LDL 59 05/31/2024     Lab Results   Component Value Date    TSH 1.750 08/29/2024     Lab Results   Component Value Date    HGBA1C 7.80 (H) 08/29/2024     Lab Results   Component Value Date    PSA 1.220 09/11/2023    PSA 1.120 09/08/2022    PSA 1.05 04/29/2020             Assessment and Plan:   Today, we have reviewed his care.  Regarding the Medicare wellness exam, he is basically up-to-date on recommended cancer screenings.  He will likely be having blood work again in 2 to 3 months, and I will review his chart then to place an order for a PSA.  We have also reviewed vaccines.    Regarding his usual care, his most recent blood work has been reviewed.  We will refill atorvastatin at this time.  No other short-term change is anticipated.  Tentative follow-up with me will be again in about a year, sooner if needed.    ADDENDUM: Please let TJ know that I have reviewed his chart further.  Based on the most recent guidelines, I would recommend he have a repeat pneumonia vaccine at this time.  He may stop by the allergy room at his convenience for Prevnar 20.  Please let the allergy room staff know that he may be coming.  Also, he had a heart murmur on exam yesterday.  I do not recall previously discussing this.  I also do not see any recent echocardiogram.  For these reasons, I would recommend moving ahead with echocardiogram in order for us to visualize the heart valves.  I have placed an order for this, and we should be in touch regarding scheduling.  If he has had a recent echocardiogram, let me know, but I do not see one in the  chart.  Thanks.    Diagnoses and all orders for this visit:    1. Physical exam (Primary)    2. Mixed hyperlipidemia  -     atorvastatin (LIPITOR) 20 MG tablet; Take 1 tablet by mouth Every Night.  Dispense: 90 tablet; Refill: 3    3. Heart murmur  -     Adult Transthoracic Echo Complete W/ Cont if Necessary Per Protocol; Future    4. Essential hypertension  -     Adult Transthoracic Echo Complete W/ Cont if Necessary Per Protocol; Future       Follow Up  Return in about 1 year (around 9/6/2025) for Recheck, Medicare Wellness.  Patient was given instructions and counseling regarding his condition or for health maintenance advice. Please see specific information pulled into the AVS if appropriate.

## 2024-09-07 NOTE — ADDENDUM NOTE
Addended by: FRANKLIN MONTEJO on: 9/7/2024 08:15 AM     Modules accepted: Orders, Level of Service

## 2024-09-12 ENCOUNTER — CLINICAL SUPPORT (OUTPATIENT)
Dept: FAMILY MEDICINE CLINIC | Age: 64
End: 2024-09-12
Payer: MEDICARE

## 2024-09-12 DIAGNOSIS — Z23 IMMUNIZATION DUE: Primary | ICD-10-CM

## 2024-09-12 PROCEDURE — 90471 IMMUNIZATION ADMIN: CPT | Performed by: FAMILY MEDICINE

## 2024-09-12 PROCEDURE — 90677 PCV20 VACCINE IM: CPT | Performed by: FAMILY MEDICINE

## 2024-09-12 PROCEDURE — 90750 HZV VACC RECOMBINANT IM: CPT | Performed by: FAMILY MEDICINE

## 2024-09-12 PROCEDURE — G0009 ADMIN PNEUMOCOCCAL VACCINE: HCPCS | Performed by: FAMILY MEDICINE

## 2024-09-23 ENCOUNTER — HOSPITAL ENCOUNTER (OUTPATIENT)
Dept: CARDIOLOGY | Facility: HOSPITAL | Age: 64
Discharge: HOME | End: 2024-09-23
Admitting: FAMILY MEDICINE
Payer: MEDICARE

## 2024-09-23 DIAGNOSIS — R01.1 HEART MURMUR: ICD-10-CM

## 2024-09-23 DIAGNOSIS — I10 ESSENTIAL HYPERTENSION: ICD-10-CM

## 2024-09-23 PROCEDURE — 93306 TTE W/DOPPLER COMPLETE: CPT | Performed by: STUDENT IN AN ORGANIZED HEALTH CARE EDUCATION/TRAINING PROGRAM

## 2024-09-23 PROCEDURE — 93306 TTE W/DOPPLER COMPLETE: CPT

## 2024-09-24 LAB
BH CV ECHO MEAS - ACS: 1.6 CM
BH CV ECHO MEAS - AO MAX PG: 8.8 MMHG
BH CV ECHO MEAS - AO MEAN PG: 5 MMHG
BH CV ECHO MEAS - AO ROOT DIAM: 3.5 CM
BH CV ECHO MEAS - AO V2 MAX: 148 CM/SEC
BH CV ECHO MEAS - AO V2 VTI: 27.8 CM
BH CV ECHO MEAS - AVA(I,D): 2.18 CM2
BH CV ECHO MEAS - EDV(CUBED): 110.6 ML
BH CV ECHO MEAS - EDV(MOD-SP2): 66 ML
BH CV ECHO MEAS - EDV(MOD-SP4): 69.3 ML
BH CV ECHO MEAS - EF(MOD-BP): 55.6 %
BH CV ECHO MEAS - EF(MOD-SP2): 54.7 %
BH CV ECHO MEAS - EF(MOD-SP4): 57.9 %
BH CV ECHO MEAS - ESV(CUBED): 29.8 ML
BH CV ECHO MEAS - ESV(MOD-SP2): 29.9 ML
BH CV ECHO MEAS - ESV(MOD-SP4): 29.2 ML
BH CV ECHO MEAS - FS: 35.4 %
BH CV ECHO MEAS - IVS/LVPW: 0.92 CM
BH CV ECHO MEAS - IVSD: 1.1 CM
BH CV ECHO MEAS - LA DIMENSION: 4 CM
BH CV ECHO MEAS - LAT PEAK E' VEL: 10.9 CM/SEC
BH CV ECHO MEAS - LV DIASTOLIC VOL/BSA (35-75): 33.2 CM2
BH CV ECHO MEAS - LV MASS(C)D: 206.4 GRAMS
BH CV ECHO MEAS - LV MAX PG: 4.2 MMHG
BH CV ECHO MEAS - LV MEAN PG: 2 MMHG
BH CV ECHO MEAS - LV SYSTOLIC VOL/BSA (12-30): 14 CM2
BH CV ECHO MEAS - LV V1 MAX: 103 CM/SEC
BH CV ECHO MEAS - LV V1 VTI: 19.3 CM
BH CV ECHO MEAS - LVIDD: 4.8 CM
BH CV ECHO MEAS - LVIDS: 3.1 CM
BH CV ECHO MEAS - LVOT AREA: 3.1 CM2
BH CV ECHO MEAS - LVOT DIAM: 2 CM
BH CV ECHO MEAS - LVPWD: 1.2 CM
BH CV ECHO MEAS - MED PEAK E' VEL: 7.1 CM/SEC
BH CV ECHO MEAS - MV A MAX VEL: 93.3 CM/SEC
BH CV ECHO MEAS - MV DEC TIME: 0.16 SEC
BH CV ECHO MEAS - MV E MAX VEL: 86.5 CM/SEC
BH CV ECHO MEAS - MV E/A: 0.93
BH CV ECHO MEAS - RAP SYSTOLE: 3 MMHG
BH CV ECHO MEAS - RVSP: 16.7 MMHG
BH CV ECHO MEAS - SV(LVOT): 60.6 ML
BH CV ECHO MEAS - SV(MOD-SP2): 36.1 ML
BH CV ECHO MEAS - SV(MOD-SP4): 40.1 ML
BH CV ECHO MEAS - SVI(LVOT): 29.1 ML/M2
BH CV ECHO MEAS - SVI(MOD-SP2): 17.3 ML/M2
BH CV ECHO MEAS - SVI(MOD-SP4): 19.2 ML/M2
BH CV ECHO MEAS - TAPSE (>1.6): 2.24 CM
BH CV ECHO MEAS - TR MAX PG: 13.7 MMHG
BH CV ECHO MEAS - TR MAX VEL: 185 CM/SEC
BH CV ECHO MEASUREMENTS AVERAGE E/E' RATIO: 9.61
LEFT ATRIUM VOLUME INDEX: 22.4 ML/M2

## 2024-09-25 DIAGNOSIS — R01.1 HEART MURMUR: Primary | ICD-10-CM

## 2024-09-25 DIAGNOSIS — R93.1 ABNORMAL ECHOCARDIOGRAM: ICD-10-CM

## 2024-09-25 DIAGNOSIS — I35.0 AORTIC VALVE STENOSIS, ETIOLOGY OF CARDIAC VALVE DISEASE UNSPECIFIED: ICD-10-CM

## 2024-09-30 ENCOUNTER — OFFICE VISIT (OUTPATIENT)
Dept: CARDIOLOGY | Facility: CLINIC | Age: 64
End: 2024-09-30
Payer: MEDICARE

## 2024-09-30 VITALS
HEART RATE: 88 BPM | HEIGHT: 72 IN | BODY MASS INDEX: 26.14 KG/M2 | WEIGHT: 193 LBS | SYSTOLIC BLOOD PRESSURE: 98 MMHG | DIASTOLIC BLOOD PRESSURE: 65 MMHG

## 2024-09-30 DIAGNOSIS — I10 ESSENTIAL HYPERTENSION: Primary | ICD-10-CM

## 2024-09-30 DIAGNOSIS — E78.2 MIXED HYPERLIPIDEMIA: ICD-10-CM

## 2024-09-30 DIAGNOSIS — R93.1 ABNORMAL ECHOCARDIOGRAM: ICD-10-CM

## 2024-09-30 PROCEDURE — 3078F DIAST BP <80 MM HG: CPT | Performed by: INTERNAL MEDICINE

## 2024-09-30 PROCEDURE — 99204 OFFICE O/P NEW MOD 45 MIN: CPT | Performed by: INTERNAL MEDICINE

## 2024-09-30 PROCEDURE — 3074F SYST BP LT 130 MM HG: CPT | Performed by: INTERNAL MEDICINE

## 2024-09-30 NOTE — PROGRESS NOTES
Chief Complaint  Establish Care    Subjective        Nicholas Givens presents to [facility] CARDIOLOGY  History of present illness:    Patient is a 64-year-old male with past medical history significant for diabetes, mild chronic kidney disease, peripheral arterial disease with carotid stent and stents in the legs.  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  When he is walking around doing yard work or gardening he notes no chest pain.  He does note bilateral hip pain.  He states he was having some dizziness but was drinking a lot of iced tea which he has back down on and has been drinking more water and it has not bothered him.  He states he went to his primary care who heard a heart murmur and ordered an echocardiogram.  He notes remote tobacco history.  Father has a history of heart attack.      Past Medical History:   Diagnosis Date    Acquired hypothyroidism     Bilateral carotid artery stenosis     Essential hypertension     Laryngeal cancer     Mixed hyperlipidemia     Nasal polyposis     Tobacco abuse     Tracheostomy present     Type 2 diabetes mellitus          Past Surgical History:   Procedure Laterality Date    CAROTID STENT Left 2023    COLONOSCOPY  2020    Serrated adenoma    COLONOSCOPY  2021    Benign polyp, diverticulosis    CORONARY STENT PLACEMENT      KNEE SURGERY      NASAL POLYP SURGERY      TRACHEOSTOMY            Social History     Socioeconomic History    Marital status: Single   Tobacco Use    Smoking status: Former     Current packs/day: 0.00     Types: Cigarettes     Quit date:      Years since quittin.7    Smokeless tobacco: Never    Tobacco comments:     quit    Vaping Use    Vaping status: Never Used   Substance and Sexual Activity    Alcohol use: Never    Drug use: Never    Sexual activity: Defer         Family History   Problem Relation Age of  "Onset    Hypertension Mother     Diabetes type II Mother     Heart attack Father     Hypertension Father     Asthma Sister     Coronary artery disease Brother           No Known Allergies         Current Outpatient Medications:     amLODIPine (NORVASC) 5 MG tablet, Take 1 tablet by mouth Daily., Disp: , Rfl:     aspirin 325 MG tablet, aspirin 325 mg oral tablet take 1 tablet (325 mg) by oral route once daily   Active, Disp: , Rfl:     atorvastatin (LIPITOR) 20 MG tablet, Take 1 tablet by mouth Every Night., Disp: 90 tablet, Rfl: 3    glimepiride (AMARYL) 4 MG tablet, Take 1 tablet by mouth 2 (two) times a day., Disp: , Rfl:     glucose blood (OneTouch Ultra) test strip, USE 1 STRIP TO CHECK GLUCOSE 4 TIMES DAILY, Disp: , Rfl:     hydroCHLOROthiazide (HYDRODIURIL) 12.5 MG tablet, Take 1 tablet by mouth Daily., Disp: , Rfl:     HYDROcodone-acetaminophen (NORCO) 5-325 MG per tablet, Take 1 tablet by mouth Every 4 (Four) to 6 (Six) Hours As Needed for Pain., Disp: , Rfl:     Insulin Pen Needle (NovoFine Plus) 32G X 4 MM misc, USE 1 PEN NEEDLE TO INJECT VICTOZA UNDER THE SKIN ONCE DAILY, Disp: , Rfl:     irbesartan (AVAPRO) 300 MG tablet, Take 1 tablet by mouth Daily., Disp: , Rfl:     Jardiance 10 MG tablet tablet, Take 1 tablet by mouth Daily., Disp: , Rfl:     metFORMIN (GLUCOPHAGE) 500 MG tablet, TAKE 1 TABLET BY MOUTH IN THE MORNING WITH BREAKFAST AND 2 TABLETS WITH SUPPER, Disp: , Rfl:     Ozempic, 1 MG/DOSE, 4 MG/3ML solution pen-injector, Inject 1 mg under the skin into the appropriate area as directed 1 (One) Time Per Week., Disp: , Rfl:     Synthroid 75 MCG tablet, Take 1 tablet by mouth Daily., Disp: , Rfl:     Zoster Vac Recomb Adjuvanted (Shingrix) 50 MCG/0.5ML reconstituted suspension, Inject 0.5 mL into the appropriate muscle as directed by prescriber., Disp: 1 each, Rfl: 1      ROS:  Cardiac review of systems negative.    Objective     BP 98/65   Pulse 88   Ht 182.9 cm (72\")   Wt 87.5 kg (193 lb)   " BMI 26.18 kg/m²       General Appearance:   well developed  well nourished  HENT:   oropharynx moist  lips not cyanotic  Respiratory:  no respiratory distress  normal breath sounds  no rales  Cardiovascular:  no jugular venous distention  regular rhythm  S1 normal, S2 normal  no S3, no S4   no murmur  no rub, no thrill  No carotid bruit  pedal pulses normal  lower extremity edema: none    Musculoskeletal:  no clubbing of fingers.   normocephalic, head atraumatic  Skin:   warm, dry  Psychiatric:  judgement and insight appropriate  normal mood and affect    ECHO:  Results for orders placed during the hospital encounter of 09/23/24    Adult Transthoracic Echo Complete W/ Cont if Necessary Per Protocol    Interpretation Summary  Normal chamber sizes.  LV has normal wall thickness.  No regional wall motion abnormalities are present.  Systolic function is normal, calculated LVEF 50-55%.  Diastolic function is normal.  RV has normal systolic function.  Probably Trileaflet aortic valve. Leaflets are thickened with reduced excursion. Mild AS is present.  Mitral valve is structurally normal. There is no significant MR noted..  Grossly normal tricuspid valve.  Trace TR is noted.  RVSP can not be estimated.  No pericardial effusion is noted.  Aortic root has normal dimensions.  IVC is normal size.  Estimated right atrial pressure is 0-5 mm Hg    Recommendation;  YEYO is recommended for further assessment of aortic valve.    STRESS:    CATH:  No results found for this or any previous visit.    BMP:     Glucose   Date Value Ref Range Status   08/29/2024 267 (H) 65 - 99 mg/dL Final     BUN   Date Value Ref Range Status   08/29/2024 20 8 - 23 mg/dL Final     Creatinine   Date Value Ref Range Status   08/29/2024 1.42 (H) 0.76 - 1.27 mg/dL Final     Sodium   Date Value Ref Range Status   08/29/2024 139 136 - 145 mmol/L Final     Potassium   Date Value Ref Range Status   08/29/2024 5.1 3.5 - 5.2 mmol/L Final     Chloride   Date Value  Ref Range Status   08/29/2024 103 98 - 107 mmol/L Final     CO2   Date Value Ref Range Status   08/29/2024 27.0 22.0 - 29.0 mmol/L Final     Calcium   Date Value Ref Range Status   08/29/2024 9.4 8.6 - 10.5 mg/dL Final     BUN/Creatinine Ratio   Date Value Ref Range Status   08/29/2024 14.1 7.0 - 25.0 Final     Anion Gap   Date Value Ref Range Status   08/29/2024 9.0 5.0 - 15.0 mmol/L Final     eGFR   Date Value Ref Range Status   08/29/2024 54.8 (L) >60.0 mL/min/1.73 Final     LIPIDS:  Total Cholesterol   Date Value Ref Range Status   05/31/2024 106 0 - 200 mg/dL Final     Triglycerides   Date Value Ref Range Status   05/31/2024 115 0 - 150 mg/dL Final     HDL Cholesterol   Date Value Ref Range Status   05/31/2024 26 (L) 40 - 60 mg/dL Final     LDL Cholesterol    Date Value Ref Range Status   05/31/2024 59 0 - 100 mg/dL Final     VLDL Cholesterol   Date Value Ref Range Status   05/31/2024 21 5 - 40 mg/dL Final     LDL/HDL Ratio   Date Value Ref Range Status   05/31/2024 2.19  Final         Procedures             ASSESSMENT:  Diagnoses and all orders for this visit:    1. Essential hypertension (Primary)    2. Mixed hyperlipidemia    3. Abnormal echocardiogram         PLAN:    1.  Blood pressures on the low end.  He notes since increasing his water intake and cutting back on the ice tea the lightheadedness has not been an issue.  If he got to the point where it was bothering him I asked him to give me a call and we would either discontinue his amlodipine or hydrochlorothiazide.  2.  Continue the Lipitor.  Patient cholesterol checked 5/31/2024 with LDL 59, HDL 26, and triglycerides 115.  These are under good control.  3.  Patient notes no exertional chest pain.  4.  Patient is following with the vascular surgeon for his carotid stent and peripheral arterial disease.  5.  Reviewed patient's echocardiogram and he definitely has thickening of his aortic valve leaflets.  This certainly could have been due to the  radiation to the vocal cords.  He has minimal max/mean gradients of 9/5 mmHg and no significant aortic insufficiency.  His heart squeeze was normal.  I would probably just recheck an echocardiogram in 1 year.  6.  Patient does not smoke.      Return in about 1 year (around 9/30/2025).     Patient was given instructions and counseling regarding his condition or for health maintenance advice. Please see specific information pulled into the AVS if appropriate.         Figueroa Velasco MD   9/30/2024  14:11 EDT

## 2024-10-02 ENCOUNTER — PATIENT ROUNDING (BHMG ONLY) (OUTPATIENT)
Dept: CARDIOLOGY | Facility: CLINIC | Age: 64
End: 2024-10-02
Payer: MEDICARE

## 2024-10-02 NOTE — PROGRESS NOTES
**A [employer] message has been sent fot PATIENT ROUNDING with [facility]IOLOG[facility] .

## 2024-11-06 ENCOUNTER — CLINICAL SUPPORT (OUTPATIENT)
Dept: FAMILY MEDICINE CLINIC | Age: 64
End: 2024-11-06
Payer: MEDICARE

## 2024-11-06 DIAGNOSIS — Z23 IMMUNIZATION DUE: Primary | ICD-10-CM

## 2024-11-06 PROCEDURE — 91320 SARSCV2 VAC 30MCG TRS-SUC IM: CPT | Performed by: FAMILY MEDICINE

## 2024-11-06 PROCEDURE — G0008 ADMIN INFLUENZA VIRUS VAC: HCPCS | Performed by: FAMILY MEDICINE

## 2024-11-06 PROCEDURE — 90480 ADMN SARSCOV2 VAC 1/ONLY CMP: CPT | Performed by: FAMILY MEDICINE

## 2024-11-06 PROCEDURE — 90662 IIV NO PRSV INCREASED AG IM: CPT | Performed by: FAMILY MEDICINE

## 2024-11-07 DIAGNOSIS — E78.2 MIXED HYPERLIPIDEMIA: ICD-10-CM

## 2024-11-07 DIAGNOSIS — E11.59 TYPE 2 DIABETES MELLITUS WITH OTHER CIRCULATORY COMPLICATION, WITH LONG-TERM CURRENT USE OF INSULIN: ICD-10-CM

## 2024-11-07 DIAGNOSIS — I10 ESSENTIAL HYPERTENSION: ICD-10-CM

## 2024-11-07 DIAGNOSIS — N18.31 STAGE 3A CHRONIC KIDNEY DISEASE: ICD-10-CM

## 2024-11-07 DIAGNOSIS — Z79.899 OTHER LONG TERM (CURRENT) DRUG THERAPY: ICD-10-CM

## 2024-11-07 DIAGNOSIS — Z79.4 TYPE 2 DIABETES MELLITUS WITH OTHER CIRCULATORY COMPLICATION, WITH LONG-TERM CURRENT USE OF INSULIN: ICD-10-CM

## 2024-11-07 DIAGNOSIS — Z12.5 PROSTATE CANCER SCREENING: Primary | ICD-10-CM

## 2024-11-07 NOTE — PROGRESS NOTES
Please reach out to TJ and see if he anticipates having upcoming blood work with endocrinology or other specialist.  I want to place orders for a PSA, prostate blood test, and possibly other labs.  However, if he has upcoming labs, we could do around the same time.  Thanks.

## 2024-11-27 ENCOUNTER — TRANSCRIBE ORDERS (OUTPATIENT)
Dept: ADMINISTRATIVE | Facility: HOSPITAL | Age: 64
End: 2024-11-27
Payer: MEDICARE

## 2024-11-27 ENCOUNTER — LAB (OUTPATIENT)
Dept: LAB | Facility: HOSPITAL | Age: 64
End: 2024-11-27
Payer: MEDICARE

## 2024-11-27 DIAGNOSIS — N18.31 STAGE 3A CHRONIC KIDNEY DISEASE: ICD-10-CM

## 2024-11-27 DIAGNOSIS — E66.3 OVER WEIGHT: ICD-10-CM

## 2024-11-27 DIAGNOSIS — I10 ESSENTIAL HYPERTENSION: ICD-10-CM

## 2024-11-27 DIAGNOSIS — I10 BENIGN HYPERTENSION: ICD-10-CM

## 2024-11-27 DIAGNOSIS — D64.9 ANEMIA, UNSPECIFIED TYPE: ICD-10-CM

## 2024-11-27 DIAGNOSIS — Z79.4 INSULIN LONG-TERM USE: ICD-10-CM

## 2024-11-27 DIAGNOSIS — E03.9 HYPOTHYROIDISM, UNSPECIFIED TYPE: ICD-10-CM

## 2024-11-27 DIAGNOSIS — E78.2 MIXED HYPERLIPIDEMIA: ICD-10-CM

## 2024-11-27 DIAGNOSIS — Z79.84 LONG TERM CURRENT USE OF ORAL HYPOGLYCEMIC DRUG: ICD-10-CM

## 2024-11-27 DIAGNOSIS — Z12.5 PROSTATE CANCER SCREENING: ICD-10-CM

## 2024-11-27 DIAGNOSIS — E11.9 DIABETES MELLITUS WITHOUT COMPLICATION: ICD-10-CM

## 2024-11-27 DIAGNOSIS — Z79.899 OTHER LONG TERM (CURRENT) DRUG THERAPY: ICD-10-CM

## 2024-11-27 DIAGNOSIS — K21.9 GASTROESOPHAGEAL REFLUX DISEASE, UNSPECIFIED WHETHER ESOPHAGITIS PRESENT: ICD-10-CM

## 2024-11-27 DIAGNOSIS — E11.59 TYPE 2 DIABETES MELLITUS WITH OTHER CIRCULATORY COMPLICATION, WITH LONG-TERM CURRENT USE OF INSULIN: ICD-10-CM

## 2024-11-27 DIAGNOSIS — D64.9 ANEMIA, UNSPECIFIED TYPE: Primary | ICD-10-CM

## 2024-11-27 DIAGNOSIS — Z79.4 TYPE 2 DIABETES MELLITUS WITH OTHER CIRCULATORY COMPLICATION, WITH LONG-TERM CURRENT USE OF INSULIN: ICD-10-CM

## 2024-11-27 LAB
ALBUMIN SERPL-MCNC: 4 G/DL (ref 3.5–5.2)
ALBUMIN/GLOB SERPL: 1.1 G/DL
ALP SERPL-CCNC: 100 U/L (ref 39–117)
ALT SERPL W P-5'-P-CCNC: 18 U/L (ref 1–41)
ANION GAP SERPL CALCULATED.3IONS-SCNC: 13 MMOL/L (ref 5–15)
AST SERPL-CCNC: 18 U/L (ref 1–40)
BILIRUB SERPL-MCNC: 0.2 MG/DL (ref 0–1.2)
BUN SERPL-MCNC: 17 MG/DL (ref 8–23)
BUN/CREAT SERPL: 12 (ref 7–25)
CALCIUM SPEC-SCNC: 9.1 MG/DL (ref 8.6–10.5)
CHLORIDE SERPL-SCNC: 103 MMOL/L (ref 98–107)
CO2 SERPL-SCNC: 25 MMOL/L (ref 22–29)
CREAT SERPL-MCNC: 1.42 MG/DL (ref 0.76–1.27)
DEPRECATED RDW RBC AUTO: 44.7 FL (ref 37–54)
EGFRCR SERPLBLD CKD-EPI 2021: 54.8 ML/MIN/1.73
ERYTHROCYTE [DISTWIDTH] IN BLOOD BY AUTOMATED COUNT: 18.1 % (ref 12.3–15.4)
GLOBULIN UR ELPH-MCNC: 3.6 GM/DL
GLUCOSE SERPL-MCNC: 187 MG/DL (ref 65–99)
HBA1C MFR BLD: 7.3 % (ref 4.8–5.6)
HCT VFR BLD AUTO: 35 % (ref 37.5–51)
HGB BLD-MCNC: 9.5 G/DL (ref 13–17.7)
MCH RBC QN AUTO: 19 PG (ref 26.6–33)
MCHC RBC AUTO-ENTMCNC: 27.1 G/DL (ref 31.5–35.7)
MCV RBC AUTO: 69.9 FL (ref 79–97)
PLATELET # BLD AUTO: 328 10*3/MM3 (ref 140–450)
PMV BLD AUTO: 9 FL (ref 6–12)
POTASSIUM SERPL-SCNC: 4.4 MMOL/L (ref 3.5–5.2)
PROT SERPL-MCNC: 7.6 G/DL (ref 6–8.5)
PSA SERPL-MCNC: 1.16 NG/ML (ref 0–4)
RBC # BLD AUTO: 5.01 10*6/MM3 (ref 4.14–5.8)
SODIUM SERPL-SCNC: 141 MMOL/L (ref 136–145)
T4 FREE SERPL-MCNC: 1.33 NG/DL (ref 0.92–1.68)
TSH SERPL DL<=0.05 MIU/L-ACNC: 2.44 UIU/ML (ref 0.27–4.2)
WBC NRBC COR # BLD AUTO: 8.62 10*3/MM3 (ref 3.4–10.8)

## 2024-11-27 PROCEDURE — 84439 ASSAY OF FREE THYROXINE: CPT

## 2024-11-27 PROCEDURE — 80053 COMPREHEN METABOLIC PANEL: CPT

## 2024-11-27 PROCEDURE — 36415 COLL VENOUS BLD VENIPUNCTURE: CPT

## 2024-11-27 PROCEDURE — G0103 PSA SCREENING: HCPCS

## 2024-11-27 PROCEDURE — 84443 ASSAY THYROID STIM HORMONE: CPT

## 2024-11-27 PROCEDURE — 85027 COMPLETE CBC AUTOMATED: CPT

## 2024-11-27 PROCEDURE — 83036 HEMOGLOBIN GLYCOSYLATED A1C: CPT

## 2024-11-27 RX ORDER — PANTOPRAZOLE SODIUM 40 MG/1
40 TABLET, DELAYED RELEASE ORAL DAILY
Qty: 90 TABLET | Refills: 0 | Status: SHIPPED | OUTPATIENT
Start: 2024-11-27

## 2025-02-21 DIAGNOSIS — K21.9 GASTROESOPHAGEAL REFLUX DISEASE, UNSPECIFIED WHETHER ESOPHAGITIS PRESENT: ICD-10-CM

## 2025-02-21 RX ORDER — PANTOPRAZOLE SODIUM 40 MG/1
40 TABLET, DELAYED RELEASE ORAL DAILY
Qty: 90 TABLET | Refills: 0 | Status: SHIPPED | OUTPATIENT
Start: 2025-02-21

## 2025-03-05 ENCOUNTER — OFFICE VISIT (OUTPATIENT)
Dept: FAMILY MEDICINE CLINIC | Age: 65
End: 2025-03-05
Payer: MEDICARE

## 2025-03-05 VITALS
HEART RATE: 102 BPM | TEMPERATURE: 98.4 F | SYSTOLIC BLOOD PRESSURE: 127 MMHG | HEIGHT: 72 IN | OXYGEN SATURATION: 93 % | DIASTOLIC BLOOD PRESSURE: 59 MMHG | WEIGHT: 205.4 LBS | BODY MASS INDEX: 27.82 KG/M2

## 2025-03-05 DIAGNOSIS — M54.42 ACUTE LEFT-SIDED LOW BACK PAIN WITH LEFT-SIDED SCIATICA: Primary | ICD-10-CM

## 2025-03-05 DIAGNOSIS — M25.552 LEFT HIP PAIN: ICD-10-CM

## 2025-03-05 PROCEDURE — 99213 OFFICE O/P EST LOW 20 MIN: CPT

## 2025-03-05 PROCEDURE — 3074F SYST BP LT 130 MM HG: CPT

## 2025-03-05 PROCEDURE — 1160F RVW MEDS BY RX/DR IN RCRD: CPT

## 2025-03-05 PROCEDURE — 3078F DIAST BP <80 MM HG: CPT

## 2025-03-05 PROCEDURE — 1159F MED LIST DOCD IN RCRD: CPT

## 2025-03-05 PROCEDURE — 1125F AMNT PAIN NOTED PAIN PRSNT: CPT

## 2025-03-05 RX ORDER — TIZANIDINE 2 MG/1
2 TABLET ORAL 2 TIMES DAILY PRN
Qty: 10 TABLET | Refills: 0 | Status: SHIPPED | OUTPATIENT
Start: 2025-03-05

## 2025-03-05 NOTE — PROGRESS NOTES
Subjective     CHIEF COMPLAINT    Chief Complaint   Patient presents with    Back Pain     Lower, pain radiates down to (L) groin. Pt denies urinary sx     History of Present Illness:  Nicholas Givens is a 64 y.o. male who presents to [facility] with acute complaint of low back pain.  This has been present since Saturday.  He reports that he was doing some yard work and breaking a lot of limbs on Friday prior to this occurring.  It does radiate into his left hip.  Denies any fevers, chills.  Denies any changes to bowel or bladder function.  No numbness or tingling. No saddle anesthesia      Review of Systems   Constitutional:  Negative for chills and fever.   Respiratory:  Negative for shortness of breath and wheezing.    Cardiovascular:  Negative for chest pain.   Genitourinary:  Negative for dysuria and frequency.   Musculoskeletal:  Positive for back pain.   Neurological:  Negative for weakness and numbness.         Past Medical History:   Diagnosis Date    Acquired hypothyroidism     Bilateral carotid artery stenosis     Essential hypertension     Laryngeal cancer     Mixed hyperlipidemia     Nasal polyposis     Tobacco abuse     Tracheostomy present     Type 2 diabetes mellitus          Past Surgical History:   Procedure Laterality Date    CAROTID STENT Left 09/26/2023    COLONOSCOPY  07/30/2020    Serrated adenoma    COLONOSCOPY  02/01/2021    Benign polyp, diverticulosis    CORONARY STENT PLACEMENT      KNEE SURGERY      NASAL POLYP SURGERY      TRACHEOSTOMY           Family History   Problem Relation Age of Onset    Hypertension Mother     Diabetes type II Mother     Heart attack Father     Hypertension Father     Asthma Sister     Coronary artery disease Brother          Social History     Socioeconomic History    Marital status: Single   Tobacco Use    Smoking status: Former     Current packs/day: 0.00     Types: Cigarettes     Quit date: 2007     Years since quitting:  "18.1    Smokeless tobacco: Never    Tobacco comments:     quit 2007   Vaping Use    Vaping status: Never Used   Substance and Sexual Activity    Alcohol use: Never    Drug use: Never    Sexual activity: Defer         No Known Allergies       Current Outpatient Medications on File Prior to Visit   Medication Sig Dispense Refill    amLODIPine (NORVASC) 5 MG tablet Take 1 tablet by mouth Daily.      aspirin 325 MG tablet aspirin 325 mg oral tablet take 1 tablet (325 mg) by oral route once daily   Active      atorvastatin (LIPITOR) 20 MG tablet Take 1 tablet by mouth Every Night. 90 tablet 3    glimepiride (AMARYL) 4 MG tablet Take 1 tablet by mouth 2 (two) times a day.      glucose blood (OneTouch Ultra) test strip USE 1 STRIP TO CHECK GLUCOSE 4 TIMES DAILY      hydroCHLOROthiazide (HYDRODIURIL) 12.5 MG tablet Take 1 tablet by mouth Daily.      HYDROcodone-acetaminophen (NORCO) 5-325 MG per tablet Take 1 tablet by mouth Every 4 (Four) to 6 (Six) Hours As Needed for Pain.      Insulin Pen Needle (NovoFine Plus) 32G X 4 MM misc USE 1 PEN NEEDLE TO INJECT VICTOZA UNDER THE SKIN ONCE DAILY      irbesartan (AVAPRO) 300 MG tablet Take 1 tablet by mouth Daily.      Jardiance 10 MG tablet tablet Take 1 tablet by mouth Daily.      metFORMIN (GLUCOPHAGE) 500 MG tablet TAKE 1 TABLET BY MOUTH IN THE MORNING WITH BREAKFAST AND 2 TABLETS WITH SUPPER      Ozempic, 1 MG/DOSE, 4 MG/3ML solution pen-injector Inject 1 mg under the skin into the appropriate area as directed 1 (One) Time Per Week.      pantoprazole (PROTONIX) 40 MG EC tablet Take 1 tablet by mouth once daily 90 tablet 0    Synthroid 75 MCG tablet Take 1 tablet by mouth Daily.      [DISCONTINUED] simvastatin (ZOCOR) 40 MG tablet Take 1 tablet by mouth Daily. 90 tablet 0     No current facility-administered medications on file prior to visit.     /59   Pulse 102   Temp 98.4 °F (36.9 °C) (Oral)   Ht 182.9 cm (72.01\")   Wt 93.2 kg (205 lb 6.4 oz)   SpO2 93% Comment: " room air  BMI 27.85 kg/m²       Objective     Physical Exam  Vitals and nursing note reviewed.   Constitutional:       General: He is not in acute distress.     Appearance: Normal appearance. He is not ill-appearing.   Eyes:      Extraocular Movements: Extraocular movements intact.      Pupils: Pupils are equal, round, and reactive to light.   Neck:      Trachea: Tracheostomy present.   Pulmonary:      Effort: Pulmonary effort is normal.   Musculoskeletal:      Lumbar back: Tenderness present. No swelling, deformity or signs of trauma. Normal range of motion. Positive left straight leg raise test. Negative right straight leg raise test.   Neurological:      General: No focal deficit present.      Mental Status: He is alert and oriented to person, place, and time.   Psychiatric:         Mood and Affect: Mood normal.         Behavior: Behavior normal.                 Assessment & Plan  Acute left-sided low back pain with left-sided sciatica  No red flags on history or exam today, will treat with tizanidine. We discussed possibly obtaining xray, but he declines at this time. Consider xray, PT if no improvement in symptoms. Recommend ice, gentle stretching.  Patient advised muscle relaxers may make him drowsy.   Orders:    tiZANidine (ZANAFLEX) 2 MG tablet; Take 1 tablet by mouth 2 (Two) Times a Day As Needed for Muscle Spasms.    Left hip pain    Orders:    tiZANidine (ZANAFLEX) 2 MG tablet; Take 1 tablet by mouth 2 (Two) Times a Day As Needed for Muscle Spasms.          Follow up:  Return if symptoms worsen or fail to improve.  Patient was given instructions and counseling regarding his condition or for health maintenance advice. Please see specific information pulled into the AVS if appropriate.

## 2025-03-13 ENCOUNTER — TELEPHONE (OUTPATIENT)
Dept: FAMILY MEDICINE CLINIC | Age: 65
End: 2025-03-13
Payer: MEDICARE

## 2025-03-13 NOTE — TELEPHONE ENCOUNTER
Pt states he saw Terri on 3/5 and she prescribed a muscle relaxer. It has not helped at all and pt states he is in pain. He can't sit, stand or lay down. He is requesting to be worked in today or tomorrow. Please advise.

## 2025-03-13 NOTE — TELEPHONE ENCOUNTER
I am not able to see him today.  If his pain is uncontrolled, he may go to urgent care or to the emergency department if needed.  It would be okay to work him in tomorrow morning.  He can expect to wait.  I am not sure how long it will be.  Thanks.

## 2025-03-14 ENCOUNTER — HOSPITAL ENCOUNTER (OUTPATIENT)
Dept: GENERAL RADIOLOGY | Facility: HOSPITAL | Age: 65
Discharge: HOME | End: 2025-03-14
Payer: MEDICARE

## 2025-03-14 ENCOUNTER — OFFICE VISIT (OUTPATIENT)
Dept: FAMILY MEDICINE CLINIC | Age: 65
End: 2025-03-14
Payer: MEDICARE

## 2025-03-14 VITALS
TEMPERATURE: 98.1 F | DIASTOLIC BLOOD PRESSURE: 70 MMHG | HEART RATE: 102 BPM | HEIGHT: 72 IN | WEIGHT: 199 LBS | BODY MASS INDEX: 26.95 KG/M2 | OXYGEN SATURATION: 99 % | SYSTOLIC BLOOD PRESSURE: 109 MMHG

## 2025-03-14 DIAGNOSIS — M54.42 ACUTE LEFT-SIDED LOW BACK PAIN WITH LEFT-SIDED SCIATICA: Primary | ICD-10-CM

## 2025-03-14 DIAGNOSIS — Z79.899 OTHER LONG TERM (CURRENT) DRUG THERAPY: ICD-10-CM

## 2025-03-14 DIAGNOSIS — M54.16 LUMBAR RADICULOPATHY: ICD-10-CM

## 2025-03-14 DIAGNOSIS — M54.42 ACUTE LEFT-SIDED LOW BACK PAIN WITH LEFT-SIDED SCIATICA: ICD-10-CM

## 2025-03-14 PROCEDURE — 72100 X-RAY EXAM L-S SPINE 2/3 VWS: CPT

## 2025-03-14 RX ORDER — ASPIRIN 81 MG/1
81 TABLET ORAL DAILY
COMMUNITY

## 2025-03-14 RX ORDER — GABAPENTIN 300 MG/1
300 CAPSULE ORAL 2 TIMES DAILY
Qty: 30 CAPSULE | Refills: 0 | Status: SHIPPED | OUTPATIENT
Start: 2025-03-14

## 2025-03-14 RX ORDER — HYDROCODONE BITARTRATE AND ACETAMINOPHEN 5; 325 MG/1; MG/1
1 TABLET ORAL EVERY 6 HOURS PRN
Qty: 12 TABLET | Refills: 0 | Status: SHIPPED | OUTPATIENT
Start: 2025-03-14

## 2025-03-14 RX ORDER — PREDNISONE 20 MG/1
TABLET ORAL
Qty: 10 TABLET | Refills: 0 | Status: SHIPPED | OUTPATIENT
Start: 2025-03-14

## 2025-03-14 NOTE — PROGRESS NOTES
Nicholas Givens presents to [facility].    Chief Complaint:  Follow up on low back pain, presumed sciatica    Subjective   History of Present Illness:  TJ is being seen today for evaluation of left-sided low back pain.  This started about 10 days ago.  He states that he was doing some outside work picking up sticks.  He did not have immediate onset of pain.  Over the next 48 hours, he developed significant pain involving the left buttocks and down the left lower extremity.  He was seen here last week and was given muscle relaxer, but that has not been helpful.  He is having almost constant pain at this time.  He has approximately 3/10 pain when he sits.  The pain becomes severe with walking and moving.  He says it can be 9/10.  His symptoms are consistent with an L5 radiculopathy.  He denies previous surgery with the low back.  He is having weakness in the left lower extremity that may be in part related to pain.    Review of Systems:  Review of Systems   Constitutional:  Negative for chills and fever.   Respiratory:  Negative for cough and shortness of breath.    Cardiovascular:  Negative for chest pain and palpitations.   Gastrointestinal:  Negative for abdominal pain, nausea and vomiting.      Objective   Medical History:  Past Medical History:    Acquired hypothyroidism    Bilateral carotid artery stenosis    Essential hypertension    Laryngeal cancer    Mixed hyperlipidemia    Nasal polyposis    Tobacco abuse    Tracheostomy present    Type 2 diabetes mellitus     Past Surgical History:    CAROTID STENT    COLONOSCOPY    Serrated adenoma    COLONOSCOPY    Benign polyp, diverticulosis    CORONARY STENT PLACEMENT    KNEE SURGERY    NASAL POLYP SURGERY    TRACHEOSTOMY      Family History   Problem Relation Age of Onset    Hypertension Mother     Diabetes type II Mother     Heart attack Father     Hypertension Father     Asthma Sister     Coronary artery disease Brother      Social  History     Tobacco Use    Smoking status: Former     Current packs/day: 0.00     Types: Cigarettes     Quit date:      Years since quittin.2    Smokeless tobacco: Never    Tobacco comments:     quit    Substance Use Topics    Alcohol use: Never       Health Maintenance Due   Topic Date Due    HEMOGLOBIN A1C  2025        Immunization History   Administered Date(s) Administered    COVID-19 (MODERNA) 1st,2nd,3rd Dose Monovalent 2021, 2021    COVID-19 (MODERNA) Monovalent Original Booster 11/10/2021    COVID-19 (PFIZER) 12YRS+ (COMIRNATY) 2024    COVID-19 (PFIZER) BIVALENT 12+YRS 2023    Fluzone  >6mos 10/25/2013    Fluzone (or Fluarix & Flulaval for VFC) >6mos 10/18/2021, 2022, 10/31/2023    Fluzone High-Dose 65+YRS 2024    Pneumococcal Conjugate 13-Valent (PCV13) 2015    Pneumococcal Conjugate 20-Valent (PCV20) 2024    Pneumococcal Polysaccharide (PPSV23) 2010    Shingrix 2024, 2024    Tdap 01/10/2022       No Known Allergies     Medications:    Current Outpatient Medications:     amLODIPine (NORVASC) 5 MG tablet, Take 1 tablet by mouth Daily., Disp: , Rfl:     atorvastatin (LIPITOR) 20 MG tablet, Take 1 tablet by mouth Every Night., Disp: 90 tablet, Rfl: 3    glimepiride (AMARYL) 4 MG tablet, Take 1 tablet by mouth 2 (two) times a day., Disp: , Rfl:     glucose blood (OneTouch Ultra) test strip, USE 1 STRIP TO CHECK GLUCOSE 4 TIMES DAILY, Disp: , Rfl:     hydroCHLOROthiazide (HYDRODIURIL) 12.5 MG tablet, Take 1 tablet by mouth Daily., Disp: , Rfl:     Insulin Pen Needle (NovoFine Plus) 32G X 4 MM misc, USE 1 PEN NEEDLE TO INJECT VICTOZA UNDER THE SKIN ONCE DAILY, Disp: , Rfl:     irbesartan (AVAPRO) 300 MG tablet, Take 1 tablet by mouth Daily., Disp: , Rfl:     Jardiance 10 MG tablet tablet, Take 1 tablet by mouth Daily., Disp: , Rfl:     Ozempic, 1 MG/DOSE, 4 MG/3ML solution pen-injector, Inject 1 mg under the skin into the  "appropriate area as directed 1 (One) Time Per Week., Disp: , Rfl:     pantoprazole (PROTONIX) 40 MG EC tablet, Take 1 tablet by mouth once daily, Disp: 90 tablet, Rfl: 0    Synthroid 75 MCG tablet, Take 1 tablet by mouth Daily., Disp: , Rfl:     aspirin 81 MG EC tablet, Take 1 tablet by mouth Daily., Disp: , Rfl:     gabapentin (NEURONTIN) 300 MG capsule, Take 1 capsule by mouth 2 (Two) Times a Day., Disp: 30 capsule, Rfl: 0    HYDROcodone-acetaminophen (NORCO) 5-325 MG per tablet, Take 1 tablet by mouth Every 6 (Six) Hours As Needed for Moderate Pain or Severe Pain., Disp: 12 tablet, Rfl: 0    metFORMIN (GLUCOPHAGE) 500 MG tablet, TAKE 1 TABLET BY MOUTH IN THE MORNING WITH BREAKFAST AND 2 TABLETS WITH SUPPER, Disp: , Rfl:     predniSONE (DELTASONE) 20 MG tablet, Take 2 tablets by oral route once daily, Disp: 10 tablet, Rfl: 0    Vital Signs:   /65 (BP Location: Left arm, Patient Position: Sitting, Cuff Size: Adult)   Pulse 105   Temp 98.1 °F (36.7 °C) (Temporal)   Ht 182.9 cm (72\")   Wt 90.3 kg (199 lb)   SpO2 99%   BMI 26.99 kg/m²       Physical Exam:  Physical Exam  Vitals reviewed.   Constitutional:       General: He is not in acute distress.     Appearance: He is ill-appearing (He is an at least moderate pain).   Eyes:      Pupils: Pupils are equal, round, and reactive to light.   Neck:      Comments: No thyromegaly  Cardiovascular:      Rate and Rhythm: Normal rate and regular rhythm.   Pulmonary:      Effort: Pulmonary effort is normal.      Breath sounds: Normal breath sounds.   Abdominal:      General: There is no distension.      Palpations: Abdomen is soft.      Tenderness: There is no abdominal tenderness.   Musculoskeletal:      Cervical back: Neck supple.   Lymphadenopathy:      Cervical: No cervical adenopathy.   Skin:     Findings: No lesion or rash.   Neurological:      General: No focal deficit present.      Mental Status: He is alert.      Motor: No weakness.      Gait: Gait abnormal " (Due to pain).      Deep Tendon Reflexes: Reflexes abnormal.     Result Review   The following data was reviewed by Buck Aparicio MD on 03/14/2025.  Lab Results   Component Value Date    WBC 8.62 11/27/2024    HGB 9.5 (L) 11/27/2024    HCT 35.0 (L) 11/27/2024    MCV 69.9 (L) 11/27/2024     11/27/2024     Lab Results   Component Value Date    GLUCOSE 187 (H) 11/27/2024    BUN 17 11/27/2024    CREATININE 1.42 (H) 11/27/2024     11/27/2024    K 4.4 11/27/2024     11/27/2024    CALCIUM 9.1 11/27/2024    PROTEINTOT 7.6 11/27/2024    ALBUMIN 4.0 11/27/2024    ALT 18 11/27/2024    AST 18 11/27/2024    ALKPHOS 100 11/27/2024    BILITOT 0.2 11/27/2024    GLOB 3.6 11/27/2024    AGRATIO 1.1 11/27/2024    BCR 12.0 11/27/2024    ANIONGAP 13.0 11/27/2024    EGFR 54.8 (L) 11/27/2024     Lab Results   Component Value Date    CHOL 106 05/31/2024    CHLPL 116 03/08/2021    TRIG 115 05/31/2024    HDL 26 (L) 05/31/2024    LDL 59 05/31/2024     Lab Results   Component Value Date    TSH 2.440 11/27/2024     Lab Results   Component Value Date    HGBA1C 7.30 (H) 11/27/2024     Lab Results   Component Value Date    PSA 1.160 11/27/2024    PSA 1.220 09/11/2023    PSA 1.120 09/08/2022          Assessment and Plan:   Today, we have reviewed his care.  CHANEL is pretty miserable with this sciatica pain.  We will move ahead with treatment as noted below.  We discussed the potential for the prednisone to cause his sugar to go up.  He will be cautious with his diet and monitor his sugars over the weekend here.  He is also having significant nerve related pain.  Gabapentin will be prescribed hopefully help with this.  We discussed potential risks for dizziness and sleepiness with it.  We will also send some hydrocodone for as needed use.  Risks have been reviewed with this including addiction, impairment, and overdose.  We will obtain his written consent for these medications.  X-rays will be obtained of the lumbar spine  today.  We will see what these results look like and reach back out Monday.  I am going to schedule a tentative follow-up in 1 week.  If he does not see fairly quick improvement, we will need to move forward with MRI to quantify what is going on with the disc.    Diagnoses and all orders for this visit:    1. Acute left-sided low back pain with left-sided sciatica (Primary)  -     predniSONE (DELTASONE) 20 MG tablet; Take 2 tablets by oral route once daily  Dispense: 10 tablet; Refill: 0  -     gabapentin (NEURONTIN) 300 MG capsule; Take 1 capsule by mouth 2 (Two) Times a Day.  Dispense: 30 capsule; Refill: 0  -     XR Spine Lumbar 2 or 3 View; Future  -     HYDROcodone-acetaminophen (NORCO) 5-325 MG per tablet; Take 1 tablet by mouth Every 6 (Six) Hours As Needed for Moderate Pain or Severe Pain.  Dispense: 12 tablet; Refill: 0    2. Lumbar radiculopathy  -     predniSONE (DELTASONE) 20 MG tablet; Take 2 tablets by oral route once daily  Dispense: 10 tablet; Refill: 0  -     gabapentin (NEURONTIN) 300 MG capsule; Take 1 capsule by mouth 2 (Two) Times a Day.  Dispense: 30 capsule; Refill: 0  -     XR Spine Lumbar 2 or 3 View; Future  -     HYDROcodone-acetaminophen (NORCO) 5-325 MG per tablet; Take 1 tablet by mouth Every 6 (Six) Hours As Needed for Moderate Pain or Severe Pain.  Dispense: 12 tablet; Refill: 0    3. Other long term (current) drug therapy  -     predniSONE (DELTASONE) 20 MG tablet; Take 2 tablets by oral route once daily  Dispense: 10 tablet; Refill: 0  -     gabapentin (NEURONTIN) 300 MG capsule; Take 1 capsule by mouth 2 (Two) Times a Day.  Dispense: 30 capsule; Refill: 0  -     XR Spine Lumbar 2 or 3 View; Future  -     HYDROcodone-acetaminophen (NORCO) 5-325 MG per tablet; Take 1 tablet by mouth Every 6 (Six) Hours As Needed for Moderate Pain or Severe Pain.  Dispense: 12 tablet; Refill: 0    Follow Up  Return in about 1 week (around 3/21/2025) for Recheck, Next scheduled follow up.  Patient was  given instructions and counseling regarding his condition or for health maintenance advice. Please see specific information pulled into the AVS if appropriate.

## 2025-03-19 ENCOUNTER — TELEPHONE (OUTPATIENT)
Dept: FAMILY MEDICINE CLINIC | Age: 65
End: 2025-03-19
Payer: MEDICARE

## 2025-03-19 NOTE — TELEPHONE ENCOUNTER
"    Caller: Nicholas Givens \"T. JMiguel\"    Relationship to patient: Self    Best call back number: [phone]     Patient is needing: PATIENT WANTS TO UPDATE THE PROVIDER THAT HE STILL HAS PAIN IN HIS HIP BUT HE IS ABLE TO TOLERATE THIS UNTIL HE IS SEEN          "

## 2025-03-21 ENCOUNTER — LAB (OUTPATIENT)
Dept: LAB | Facility: HOSPITAL | Age: 65
End: 2025-03-21
Payer: MEDICARE

## 2025-03-21 ENCOUNTER — OFFICE VISIT (OUTPATIENT)
Dept: FAMILY MEDICINE CLINIC | Age: 65
End: 2025-03-21
Payer: MEDICARE

## 2025-03-21 ENCOUNTER — TRANSCRIBE ORDERS (OUTPATIENT)
Dept: ADMINISTRATIVE | Facility: HOSPITAL | Age: 65
End: 2025-03-21
Payer: MEDICARE

## 2025-03-21 VITALS
OXYGEN SATURATION: 96 % | BODY MASS INDEX: 27.44 KG/M2 | SYSTOLIC BLOOD PRESSURE: 118 MMHG | WEIGHT: 202.6 LBS | HEIGHT: 72 IN | TEMPERATURE: 98 F | DIASTOLIC BLOOD PRESSURE: 60 MMHG | HEART RATE: 102 BPM

## 2025-03-21 DIAGNOSIS — M54.42 ACUTE LEFT-SIDED LOW BACK PAIN WITH LEFT-SIDED SCIATICA: Primary | ICD-10-CM

## 2025-03-21 DIAGNOSIS — D64.9 ANEMIA, UNSPECIFIED TYPE: Primary | ICD-10-CM

## 2025-03-21 DIAGNOSIS — I10 HYPERTENSION, ESSENTIAL: ICD-10-CM

## 2025-03-21 DIAGNOSIS — E78.5 HYPERLIPIDEMIA, UNSPECIFIED HYPERLIPIDEMIA TYPE: ICD-10-CM

## 2025-03-21 DIAGNOSIS — E66.3 OVER WEIGHT: ICD-10-CM

## 2025-03-21 DIAGNOSIS — Z79.84 LONG TERM (CURRENT) USE OF ORAL HYPOGLYCEMIC DRUGS: ICD-10-CM

## 2025-03-21 DIAGNOSIS — Z79.899 OTHER LONG TERM (CURRENT) DRUG THERAPY: ICD-10-CM

## 2025-03-21 DIAGNOSIS — M25.552 LEFT HIP PAIN: ICD-10-CM

## 2025-03-21 DIAGNOSIS — E03.9 HYPOTHYROIDISM, UNSPECIFIED TYPE: ICD-10-CM

## 2025-03-21 DIAGNOSIS — M54.16 LUMBAR RADICULOPATHY: ICD-10-CM

## 2025-03-21 DIAGNOSIS — E11.9 DIABETES MELLITUS WITHOUT COMPLICATION: ICD-10-CM

## 2025-03-21 DIAGNOSIS — E11.9 DIABETES MELLITUS WITHOUT COMPLICATION: Primary | ICD-10-CM

## 2025-03-21 LAB
ALBUMIN SERPL-MCNC: 4.1 G/DL (ref 3.5–5.2)
ALBUMIN/GLOB SERPL: 1.1 G/DL
ALP SERPL-CCNC: 109 U/L (ref 39–117)
ALT SERPL W P-5'-P-CCNC: 13 U/L (ref 1–41)
AMPHET+METHAMPHET UR QL: NEGATIVE
AMPHETAMINES UR QL: NEGATIVE
ANION GAP SERPL CALCULATED.3IONS-SCNC: 11.8 MMOL/L (ref 5–15)
AST SERPL-CCNC: 21 U/L (ref 1–40)
BARBITURATES UR QL SCN: NEGATIVE
BENZODIAZ UR QL SCN: NEGATIVE
BILIRUB SERPL-MCNC: 0.3 MG/DL (ref 0–1.2)
BUN SERPL-MCNC: 27 MG/DL (ref 8–23)
BUN/CREAT SERPL: 13.6 (ref 7–25)
BUPRENORPHINE SERPL-MCNC: NEGATIVE NG/ML
CALCIUM SPEC-SCNC: 8.7 MG/DL (ref 8.6–10.5)
CANNABINOIDS SERPL QL: POSITIVE
CHLORIDE SERPL-SCNC: 98 MMOL/L (ref 98–107)
CO2 SERPL-SCNC: 24.2 MMOL/L (ref 22–29)
COCAINE UR QL: NEGATIVE
CREAT SERPL-MCNC: 1.99 MG/DL (ref 0.76–1.27)
EGFRCR SERPLBLD CKD-EPI 2021: 36.6 ML/MIN/1.73
EXPIRATION DATE: ABNORMAL
FERRITIN SERPL-MCNC: 10.15 NG/ML (ref 30–400)
GLOBULIN UR ELPH-MCNC: 3.6 GM/DL
GLUCOSE SERPL-MCNC: 388 MG/DL (ref 65–99)
HBA1C MFR BLD: 8.1 % (ref 4.8–5.6)
IRON 24H UR-MRATE: 36 MCG/DL (ref 59–158)
IRON SATN MFR SERPL: 8 % (ref 20–50)
Lab: ABNORMAL
MDMA UR QL SCN: NEGATIVE
METHADONE UR QL SCN: NEGATIVE
MORPHINE/OPIATES SCREEN, URINE: POSITIVE
OXYCODONE UR QL SCN: NEGATIVE
PCP UR QL SCN: NEGATIVE
POTASSIUM SERPL-SCNC: 5.1 MMOL/L (ref 3.5–5.2)
PROT SERPL-MCNC: 7.7 G/DL (ref 6–8.5)
SODIUM SERPL-SCNC: 134 MMOL/L (ref 136–145)
T4 FREE SERPL-MCNC: 1.51 NG/DL (ref 0.92–1.68)
TIBC SERPL-MCNC: 447 MCG/DL (ref 298–536)
TRANSFERRIN SERPL-MCNC: 300 MG/DL (ref 200–360)
TSH SERPL DL<=0.05 MIU/L-ACNC: 2.23 UIU/ML (ref 0.27–4.2)

## 2025-03-21 PROCEDURE — 84443 ASSAY THYROID STIM HORMONE: CPT

## 2025-03-21 PROCEDURE — 83036 HEMOGLOBIN GLYCOSYLATED A1C: CPT

## 2025-03-21 PROCEDURE — 84439 ASSAY OF FREE THYROXINE: CPT

## 2025-03-21 PROCEDURE — 80053 COMPREHEN METABOLIC PANEL: CPT

## 2025-03-21 PROCEDURE — 36415 COLL VENOUS BLD VENIPUNCTURE: CPT

## 2025-03-21 RX ORDER — GABAPENTIN 300 MG/1
300 CAPSULE ORAL 2 TIMES DAILY
Qty: 60 CAPSULE | Refills: 0 | Status: SHIPPED | OUTPATIENT
Start: 2025-03-21

## 2025-03-21 NOTE — PROGRESS NOTES
Nicholas Givens presents to [facility].    Chief Complaint:  Follow up on low back pain, sciatica    Subjective   History of Present Illness:  TJ is being seen today for follow-up.  Please see his recent visit note from a week ago.  He states that he did get at least moderate relief when he was on the steroid pack.  However, he has completed that, and he is having recurring difficulty with pain.  Gabapentin does seem to be of some benefit for him.  That does cause him some sleepiness.  He was also prescribed hydrocodone for limited use.  He has seen some benefit from that.  I do not think we can prescribe that regularly though.  He does use THC on a daily basis.  I do think it is reasonable to continue gabapentin though.  He would estimate that he is approximately 65% better compared to a week ago.  This is a backward step compared to earlier in the week though.  He is having low back pain radiating to the left leg again.    Review of Systems:  Review of Systems   Constitutional:  Negative for chills and fever.   Respiratory:  Negative for cough and shortness of breath.    Cardiovascular:  Negative for chest pain and palpitations.   Gastrointestinal:  Negative for abdominal pain, nausea and vomiting.      Objective   Medical History:  Past Medical History:    Acquired hypothyroidism    Bilateral carotid artery stenosis    Essential hypertension    Laryngeal cancer    Mixed hyperlipidemia    Nasal polyposis    Tobacco abuse    Tracheostomy present    Type 2 diabetes mellitus     Past Surgical History:    CAROTID STENT    COLONOSCOPY    Serrated adenoma    COLONOSCOPY    Benign polyp, diverticulosis    CORONARY STENT PLACEMENT    KNEE SURGERY    NASAL POLYP SURGERY    TRACHEOSTOMY      Family History   Problem Relation Age of Onset    Hypertension Mother     Diabetes type II Mother     Heart attack Father     Hypertension Father     Asthma Sister     Coronary artery disease Brother       Social History     Tobacco Use    Smoking status: Former     Current packs/day: 0.00     Types: Cigarettes     Quit date:      Years since quittin.2    Smokeless tobacco: Never    Tobacco comments:     quit    Substance Use Topics    Alcohol use: Never       Health Maintenance Due   Topic Date Due    HEMOGLOBIN A1C  2025        Immunization History   Administered Date(s) Administered    COVID-19 (MODERNA) 1st,2nd,3rd Dose Monovalent 2021, 2021    COVID-19 (MODERNA) Monovalent Original Booster 11/10/2021    COVID-19 (PFIZER) 12YRS+ (COMIRNATY) 2024    COVID-19 (PFIZER) BIVALENT 12+YRS 2023    Fluzone  >6mos 10/25/2013    Fluzone (or Fluarix & Flulaval for VFC) >6mos 10/18/2021, 2022, 10/31/2023    Fluzone High-Dose 65+YRS 2024    Pneumococcal Conjugate 13-Valent (PCV13) 2015    Pneumococcal Conjugate 20-Valent (PCV20) 2024    Pneumococcal Polysaccharide (PPSV23) 2010    Shingrix 2024, 2024    Tdap 01/10/2022       No Known Allergies     Medications:    Current Outpatient Medications:     amLODIPine (NORVASC) 5 MG tablet, Take 1 tablet by mouth Daily., Disp: , Rfl:     aspirin 81 MG EC tablet, Take 1 tablet by mouth Daily., Disp: , Rfl:     atorvastatin (LIPITOR) 20 MG tablet, Take 1 tablet by mouth Every Night., Disp: 90 tablet, Rfl: 3    gabapentin (NEURONTIN) 300 MG capsule, Take 1 capsule by mouth 2 (Two) Times a Day., Disp: 60 capsule, Rfl: 0    glimepiride (AMARYL) 4 MG tablet, Take 1 tablet by mouth 2 (two) times a day., Disp: , Rfl:     glucose blood (OneTouch Ultra) test strip, USE 1 STRIP TO CHECK GLUCOSE 4 TIMES DAILY, Disp: , Rfl:     hydroCHLOROthiazide (HYDRODIURIL) 12.5 MG tablet, Take 1 tablet by mouth Daily., Disp: , Rfl:     HYDROcodone-acetaminophen (NORCO) 5-325 MG per tablet, Take 1 tablet by mouth Every 6 (Six) Hours As Needed for Moderate Pain or Severe Pain., Disp: 12 tablet, Rfl: 0    Insulin Pen Needle  "(NovoFine Plus) 32G X 4 MM misc, USE 1 PEN NEEDLE TO INJECT VICTOZA UNDER THE SKIN ONCE DAILY, Disp: , Rfl:     irbesartan (AVAPRO) 300 MG tablet, Take 1 tablet by mouth Daily., Disp: , Rfl:     Jardiance 10 MG tablet tablet, Take 1 tablet by mouth Daily., Disp: , Rfl:     metFORMIN (GLUCOPHAGE) 500 MG tablet, TAKE 1 TABLET BY MOUTH IN THE MORNING WITH BREAKFAST AND 2 TABLETS WITH SUPPER, Disp: , Rfl:     Ozempic, 1 MG/DOSE, 4 MG/3ML solution pen-injector, Inject 1 mg under the skin into the appropriate area as directed 1 (One) Time Per Week., Disp: , Rfl:     pantoprazole (PROTONIX) 40 MG EC tablet, Take 1 tablet by mouth once daily, Disp: 90 tablet, Rfl: 0    predniSONE (DELTASONE) 20 MG tablet, Take 2 tablets by oral route once daily, Disp: 10 tablet, Rfl: 0    Synthroid 75 MCG tablet, Take 1 tablet by mouth Daily., Disp: , Rfl:     Vital Signs:   /60 (BP Location: Left arm, Patient Position: Sitting) Comment: manual  Pulse 102   Temp 98 °F (36.7 °C) (Oral)   Ht 182.9 cm (72.01\")   Wt 91.9 kg (202 lb 9.6 oz)   SpO2 96%   BMI 27.47 kg/m²       Physical Exam:  Physical Exam  Vitals reviewed.   Constitutional:       General: He is not in acute distress.     Appearance: He is not ill-appearing.   Eyes:      Pupils: Pupils are equal, round, and reactive to light.   Neck:      Comments: No thyromegaly  Cardiovascular:      Rate and Rhythm: Normal rate and regular rhythm.   Pulmonary:      Effort: Pulmonary effort is normal.      Breath sounds: Normal breath sounds.   Abdominal:      General: There is no distension.      Palpations: Abdomen is soft.      Tenderness: There is no abdominal tenderness.   Musculoskeletal:      Cervical back: Neck supple.   Lymphadenopathy:      Cervical: No cervical adenopathy.   Skin:     Findings: No lesion or rash.   Neurological:      Mental Status: He is alert.         Result Review   The following data was reviewed by Buck Aparicio MD on 03/21/2025.  Lab Results "   Component Value Date    WBC 8.62 11/27/2024    HGB 9.5 (L) 11/27/2024    HCT 35.0 (L) 11/27/2024    MCV 69.9 (L) 11/27/2024     11/27/2024     Lab Results   Component Value Date    GLUCOSE 187 (H) 11/27/2024    BUN 17 11/27/2024    CREATININE 1.42 (H) 11/27/2024     11/27/2024    K 4.4 11/27/2024     11/27/2024    CALCIUM 9.1 11/27/2024    PROTEINTOT 7.6 11/27/2024    ALBUMIN 4.0 11/27/2024    ALT 18 11/27/2024    AST 18 11/27/2024    ALKPHOS 100 11/27/2024    BILITOT 0.2 11/27/2024    GLOB 3.6 11/27/2024    AGRATIO 1.1 11/27/2024    BCR 12.0 11/27/2024    ANIONGAP 13.0 11/27/2024    EGFR 54.8 (L) 11/27/2024     Lab Results   Component Value Date    CHOL 106 05/31/2024    CHLPL 116 03/08/2021    TRIG 115 05/31/2024    HDL 26 (L) 05/31/2024    LDL 59 05/31/2024     Lab Results   Component Value Date    TSH 2.440 11/27/2024     Lab Results   Component Value Date    HGBA1C 7.30 (H) 11/27/2024     Lab Results   Component Value Date    PSA 1.160 11/27/2024    PSA 1.220 09/11/2023    PSA 1.120 09/08/2022          Assessment and Plan:   Today, we have reviewed his care.  He is still having significant pain, but it is not nearly as bad as it was a week ago.  The steroid pack made a big difference initially, but the benefit is less so now.  We discussed options and we will move ahead with a referral to physical therapy.  I am hopeful that they may be able to help him resolve this over the next few weeks.  We will refill gabapentin for a month today.  We will plan to reach out to TJ by telephone in 3 weeks and see how he is doing.  It remains an open question whether to consider MRI.  If he has significant worsening of pain between now and then, I would consider moving ahead.  We will see how things progress.  He admits that he does use THC on a regular basis.  I still think it is reasonable to prescribe gabapentin.  He has not had obvious side effects from it, and it does not have significant abuse  potential in my opinion.  We will monitor and limit the prescribing of this though.  Urine tox will be obtained today.    Diagnoses and all orders for this visit:    1. Acute left-sided low back pain with left-sided sciatica (Primary)  -     Ambulatory Referral to Physical Therapy for Evaluation & Treatment  -     gabapentin (NEURONTIN) 300 MG capsule; Take 1 capsule by mouth 2 (Two) Times a Day.  Dispense: 60 capsule; Refill: 0    2. Lumbar radiculopathy  -     Ambulatory Referral to Physical Therapy for Evaluation & Treatment  -     gabapentin (NEURONTIN) 300 MG capsule; Take 1 capsule by mouth 2 (Two) Times a Day.  Dispense: 60 capsule; Refill: 0    3. Left hip pain  -     Ambulatory Referral to Physical Therapy for Evaluation & Treatment    4. Other long term (current) drug therapy  -     gabapentin (NEURONTIN) 300 MG capsule; Take 1 capsule by mouth 2 (Two) Times a Day.  Dispense: 60 capsule; Refill: 0  -     POC Medline 12 Panel Urine Drug Screen    Follow Up  Return in about 6 months (around 9/11/2025) for Recheck, Next scheduled follow up.  Patient was given instructions and counseling regarding his condition or for health maintenance advice. Please see specific information pulled into the AVS if appropriate.

## 2025-03-21 NOTE — Clinical Note
Please TICKLE to call him in 3 weeks.  How is he doing with regard to the pain?  Is a getting better or worse?  Is physical therapy helpful?  Should we consider moving ahead with MRI?  Do we need to refill gabapentin for another month?  Thanks.

## 2025-03-22 ENCOUNTER — RESULTS FOLLOW-UP (OUTPATIENT)
Dept: LAB | Facility: HOSPITAL | Age: 65
End: 2025-03-22
Payer: MEDICARE

## 2025-03-22 DIAGNOSIS — D64.9 ANEMIA, UNSPECIFIED TYPE: ICD-10-CM

## 2025-03-22 DIAGNOSIS — R79.89 ELEVATED SERUM CREATININE: ICD-10-CM

## 2025-03-22 DIAGNOSIS — I10 HYPERTENSION, ESSENTIAL: Primary | ICD-10-CM

## 2025-03-26 ENCOUNTER — LAB (OUTPATIENT)
Dept: LAB | Facility: HOSPITAL | Age: 65
End: 2025-03-26
Payer: MEDICARE

## 2025-03-26 DIAGNOSIS — D64.9 ANEMIA, UNSPECIFIED TYPE: ICD-10-CM

## 2025-03-26 DIAGNOSIS — I10 HYPERTENSION, ESSENTIAL: ICD-10-CM

## 2025-03-26 DIAGNOSIS — R79.89 ELEVATED SERUM CREATININE: ICD-10-CM

## 2025-03-26 LAB
ANION GAP SERPL CALCULATED.3IONS-SCNC: 10.2 MMOL/L (ref 5–15)
BACTERIA UR QL AUTO: NORMAL /HPF
BASOPHILS # BLD AUTO: 0.02 10*3/MM3 (ref 0–0.2)
BASOPHILS NFR BLD AUTO: 0.2 % (ref 0–1.5)
BILIRUB UR QL STRIP: NEGATIVE
BUN SERPL-MCNC: 26 MG/DL (ref 8–23)
BUN/CREAT SERPL: 14.4 (ref 7–25)
CALCIUM SPEC-SCNC: 9.3 MG/DL (ref 8.6–10.5)
CHLORIDE SERPL-SCNC: 100 MMOL/L (ref 98–107)
CLARITY UR: CLEAR
CO2 SERPL-SCNC: 26.8 MMOL/L (ref 22–29)
COLOR UR: YELLOW
CREAT SERPL-MCNC: 1.8 MG/DL (ref 0.76–1.27)
DEPRECATED RDW RBC AUTO: 47.1 FL (ref 37–54)
EGFRCR SERPLBLD CKD-EPI 2021: 41.3 ML/MIN/1.73
EOSINOPHIL # BLD AUTO: 0.1 10*3/MM3 (ref 0–0.4)
EOSINOPHIL NFR BLD AUTO: 1 % (ref 0.3–6.2)
ERYTHROCYTE [DISTWIDTH] IN BLOOD BY AUTOMATED COUNT: 19.6 % (ref 12.3–15.4)
FOLATE SERPL-MCNC: 10.8 NG/ML (ref 4.78–24.2)
GLUCOSE SERPL-MCNC: 166 MG/DL (ref 65–99)
GLUCOSE UR STRIP-MCNC: ABNORMAL MG/DL
HCT VFR BLD AUTO: 34.5 % (ref 37.5–51)
HGB BLD-MCNC: 9.2 G/DL (ref 13–17.7)
HGB UR QL STRIP.AUTO: NEGATIVE
IMM GRANULOCYTES # BLD AUTO: 0.08 10*3/MM3 (ref 0–0.05)
IMM GRANULOCYTES NFR BLD AUTO: 0.8 % (ref 0–0.5)
KETONES UR QL STRIP: NEGATIVE
LEUKOCYTE ESTERASE UR QL STRIP.AUTO: NEGATIVE
LYMPHOCYTES # BLD AUTO: 1.82 10*3/MM3 (ref 0.7–3.1)
LYMPHOCYTES NFR BLD AUTO: 18.5 % (ref 19.6–45.3)
MCH RBC QN AUTO: 18.3 PG (ref 26.6–33)
MCHC RBC AUTO-ENTMCNC: 26.7 G/DL (ref 31.5–35.7)
MCV RBC AUTO: 68.6 FL (ref 79–97)
MONOCYTES # BLD AUTO: 0.83 10*3/MM3 (ref 0.1–0.9)
MONOCYTES NFR BLD AUTO: 8.5 % (ref 5–12)
NEUTROPHILS NFR BLD AUTO: 6.97 10*3/MM3 (ref 1.7–7)
NEUTROPHILS NFR BLD AUTO: 71 % (ref 42.7–76)
NITRITE UR QL STRIP: NEGATIVE
PH UR STRIP.AUTO: 5.5 [PH] (ref 5–8)
PLATELET # BLD AUTO: 350 10*3/MM3 (ref 140–450)
PMV BLD AUTO: 9.3 FL (ref 6–12)
POTASSIUM SERPL-SCNC: 4.6 MMOL/L (ref 3.5–5.2)
PROT UR QL STRIP: NEGATIVE
RBC # BLD AUTO: 5.03 10*6/MM3 (ref 4.14–5.8)
RBC # UR STRIP: NORMAL /HPF
REF LAB TEST METHOD: NORMAL
SODIUM SERPL-SCNC: 137 MMOL/L (ref 136–145)
SP GR UR STRIP: 1.01 (ref 1–1.03)
SQUAMOUS #/AREA URNS HPF: NORMAL /HPF
UROBILINOGEN UR QL STRIP: ABNORMAL
VIT B12 BLD-MCNC: 454 PG/ML (ref 211–946)
WBC # UR STRIP: NORMAL /HPF
WBC NRBC COR # BLD AUTO: 9.82 10*3/MM3 (ref 3.4–10.8)

## 2025-03-26 PROCEDURE — 81001 URINALYSIS AUTO W/SCOPE: CPT

## 2025-03-26 PROCEDURE — 36415 COLL VENOUS BLD VENIPUNCTURE: CPT

## 2025-03-26 PROCEDURE — 85025 COMPLETE CBC W/AUTO DIFF WBC: CPT

## 2025-03-26 PROCEDURE — 82607 VITAMIN B-12: CPT

## 2025-03-26 PROCEDURE — 82746 ASSAY OF FOLIC ACID SERUM: CPT

## 2025-03-26 PROCEDURE — 80048 BASIC METABOLIC PNL TOTAL CA: CPT

## 2025-03-27 ENCOUNTER — TELEPHONE (OUTPATIENT)
Dept: FAMILY MEDICINE CLINIC | Age: 65
End: 2025-03-27
Payer: MEDICARE

## 2025-03-27 NOTE — TELEPHONE ENCOUNTER
"  Caller: Nicholas Givens \"T. JMiguel\"    Relationship: Self    Best call back number: [phone]     What is the best time to reach you: ANYTIME     Who are you requesting to speak with (clinical staff, provider,  specific staff member): CLINICAL     What was the call regarding: PATIENT IS CALLING REQUESTING TO CHECK THE STATUS OF A REFERRAL, ALSO NEEDING TO SPEAK WITH NURSE ABOUT STOOL SAMPLE.   "

## 2025-03-28 ENCOUNTER — LAB (OUTPATIENT)
Dept: LAB | Facility: HOSPITAL | Age: 65
End: 2025-03-28
Payer: MEDICARE

## 2025-03-28 DIAGNOSIS — R79.89 ELEVATED SERUM CREATININE: ICD-10-CM

## 2025-03-28 DIAGNOSIS — D64.9 ANEMIA, UNSPECIFIED TYPE: ICD-10-CM

## 2025-03-28 DIAGNOSIS — I10 HYPERTENSION, ESSENTIAL: ICD-10-CM

## 2025-03-28 LAB — HEMOCCULT STL QL IA: NEGATIVE

## 2025-03-28 PROCEDURE — 82274 ASSAY TEST FOR BLOOD FECAL: CPT

## 2025-03-31 ENCOUNTER — TRANSCRIBE ORDERS (OUTPATIENT)
Dept: ADMINISTRATIVE | Facility: HOSPITAL | Age: 65
End: 2025-03-31
Payer: MEDICARE

## 2025-03-31 DIAGNOSIS — N18.31 STAGE 3A CHRONIC KIDNEY DISEASE: Primary | ICD-10-CM

## 2025-04-01 ENCOUNTER — TRANSCRIBE ORDERS (OUTPATIENT)
Dept: ADMINISTRATIVE | Facility: HOSPITAL | Age: 65
End: 2025-04-01
Payer: MEDICARE

## 2025-04-01 DIAGNOSIS — N18.30 ANEMIA DUE TO STAGE 3 CHRONIC KIDNEY DISEASE, UNSPECIFIED WHETHER STAGE 3A OR 3B CKD: ICD-10-CM

## 2025-04-01 DIAGNOSIS — D50.9 IRON DEFICIENCY ANEMIA, UNSPECIFIED IRON DEFICIENCY ANEMIA TYPE: Primary | ICD-10-CM

## 2025-04-01 DIAGNOSIS — D63.1 ANEMIA DUE TO STAGE 3 CHRONIC KIDNEY DISEASE, UNSPECIFIED WHETHER STAGE 3A OR 3B CKD: ICD-10-CM

## 2025-04-02 ENCOUNTER — TELEPHONE (OUTPATIENT)
Dept: FAMILY MEDICINE CLINIC | Age: 65
End: 2025-04-02
Payer: MEDICARE

## 2025-04-02 DIAGNOSIS — E61.1 IRON DEFICIENCY: ICD-10-CM

## 2025-04-02 DIAGNOSIS — D63.1 ANEMIA DUE TO STAGE 3 CHRONIC KIDNEY DISEASE, UNSPECIFIED WHETHER STAGE 3A OR 3B CKD: Primary | ICD-10-CM

## 2025-04-02 DIAGNOSIS — N18.30 ANEMIA DUE TO STAGE 3 CHRONIC KIDNEY DISEASE, UNSPECIFIED WHETHER STAGE 3A OR 3B CKD: Primary | ICD-10-CM

## 2025-04-02 RX ORDER — SODIUM CHLORIDE 9 MG/ML
20 INJECTION, SOLUTION INTRAVENOUS ONCE
OUTPATIENT
Start: 2025-04-11

## 2025-04-02 NOTE — TELEPHONE ENCOUNTER
Caller: RONA COLE    Relationship: Emergency Contact    Best call back number: [phone]     What specialty or service is being requested: PHYSICAL THERAPY    What is the provider, practice or medical service name: [facility]    What is the office location: [place]    What is the office phone number: FAX: [phone]    Any additional details: RONA STATES THE PATIENT NEEDED A REFERRAL TO THIS PHYSICAL THERAPY OFFICE SPECIFICALLY BUT THEY REPORTED THAT THEY HADN'T RECEIVED ONE.     IT APPEARS AS IF THE REFERRAL WAS SENT TO A [facility] PHYSICAL THERAPIST.

## 2025-04-04 NOTE — PROGRESS NOTES
Chief Complaint        Anemia (Patient has no concerns. He was sent over by his PCP for Anemia, has hx of laryngeal cancer. )    Patient or patient representative verbalized consent for the use of Ambient Listening during the visit with  JESSICA Cohen for chart documentation. 4/11/2025  12:53 EDT    History of Present Illness      Nicholas Givens is a 64 y.o. male who presents to [facility] as a new patient       History of Present Illness  The patient presents for evaluation of iron deficiency and anemia.    He is scheduled to receive iron infusions today.  Patient reports no presence of blood in his stool or black stools.  His bowel movements are regular, and he does not experience any episodes of nausea or vomiting.  Patient has a history of laryngeal cancer but has not had an oncology consultation in the past 12 years.  Patient declines the recommendation for an EGD and colonoscopy at this time.  Patient's last colonoscopy was performed in 2021.  Patient denies fever, nausea, vomiting, weight loss, night sweats, melena, hematochezia, hematemesis.  Patient has a follow-up with nephrology.    Most recent labs- 3/26/25    Occult Blood- 3/28/25    Colonoscopy: Review of the patient's most recent colonoscopy performed by Dr. Schaefer on 2/1/2021    Colonoscopy: Review of the patient's most recent colonoscopy performed by Dr. Schaefer on 7/30/2020      Results       Result Review :   The following data was reviewed by: JESSICA Cohen on 04/11/2025     CMP          11/27/2024    11:23 3/21/2025    14:56 3/26/2025    12:34   CMP   Glucose 187  388  166    BUN 17 27  26    Creatinine 1.42  1.99  1.80    EGFR 54.8  36.6  41.3    Sodium 141  134  137    Potassium 4.4  5.1  4.6    Chloride 103  98  100    Calcium 9.1  8.7  9.3    Total Protein 7.6  7.7     Albumin 4.0  4.1     Globulin 3.6  3.6     Total Bilirubin 0.2  0.3     Alkaline Phosphatase 100  109     AST (SGOT) 18  21      ALT (SGPT) 18  13     Albumin/Globulin Ratio 1.1  1.1     BUN/Creatinine Ratio 12.0  13.6  14.4    Anion Gap 13.0  11.8  10.2      CBC          11/27/2024    11:23 3/26/2025    12:34   CBC   WBC 8.62  9.82    RBC 5.01  5.03    Hemoglobin 9.5  9.2    Hematocrit 35.0  34.5    MCV 69.9  68.6    MCH 19.0  18.3    MCHC 27.1  26.7    RDW 18.1  19.6    Platelets 328  350        Iron Profile   Iron   Date Value Ref Range Status   03/21/2025 36 (L) 59 - 158 mcg/dL Final     TIBC   Date Value Ref Range Status   03/21/2025 447 298 - 536 mcg/dL Final     Iron Saturation (TSAT)   Date Value Ref Range Status   03/21/2025 8 (L) 20 - 50 % Final     Transferrin   Date Value Ref Range Status   03/21/2025 300 200 - 360 mg/dL Final     Ferritin   Ferritin   Date Value Ref Range Status   03/21/2025 10.15 (L) 30.00 - 400.00 ng/mL Final            Results  Laboratory Studies  Occult blood test was normal.        Past Medical History       Past Medical History:   Diagnosis Date    Acquired hypothyroidism     Bilateral carotid artery stenosis     Chronic kidney disease 2020    Essential hypertension     Laryngeal cancer     Mixed hyperlipidemia     Nasal polyposis     Tobacco abuse     Tracheostomy present     Type 2 diabetes mellitus        Past Surgical History:   Procedure Laterality Date    CAROTID STENT Left 09/26/2023    COLONOSCOPY  07/30/2020    Serrated adenoma    COLONOSCOPY  02/01/2021    Benign polyp, diverticulosis    CORONARY STENT PLACEMENT      KNEE SURGERY      NASAL POLYP SURGERY      TRACHEOSTOMY           Current Outpatient Medications:     amLODIPine (NORVASC) 5 MG tablet, Take 1 tablet by mouth Daily., Disp: , Rfl:     aspirin 81 MG EC tablet, Take 1 tablet by mouth Daily., Disp: , Rfl:     atorvastatin (LIPITOR) 20 MG tablet, Take 1 tablet by mouth Every Night., Disp: 90 tablet, Rfl: 3    clopidogrel (PLAVIX) 75 MG tablet, Take 1 tablet by mouth., Disp: , Rfl:     gabapentin (NEURONTIN) 300 MG capsule, Take 1  capsule by mouth 2 (Two) Times a Day., Disp: 60 capsule, Rfl: 0    glimepiride (AMARYL) 4 MG tablet, Take 1 tablet by mouth 2 (two) times a day., Disp: , Rfl:     glucose blood (OneTouch Ultra) test strip, USE 1 STRIP TO CHECK GLUCOSE 4 TIMES DAILY, Disp: , Rfl:     hydroCHLOROthiazide (HYDRODIURIL) 12.5 MG tablet, Take 1 tablet by mouth Daily., Disp: , Rfl:     HYDROcodone-acetaminophen (NORCO) 5-325 MG per tablet, Take 1 tablet by mouth Every 6 (Six) Hours As Needed for Moderate Pain or Severe Pain., Disp: 12 tablet, Rfl: 0    Insulin Pen Needle (NovoFine Plus) 32G X 4 MM misc, USE 1 PEN NEEDLE TO INJECT VICTOZA UNDER THE SKIN ONCE DAILY, Disp: , Rfl:     irbesartan (AVAPRO) 300 MG tablet, Take 1 tablet by mouth Daily., Disp: , Rfl:     Jardiance 10 MG tablet tablet, Take 1 tablet by mouth Daily., Disp: , Rfl:     metFORMIN (GLUCOPHAGE) 500 MG tablet, TAKE 1 TABLET BY MOUTH IN THE MORNING WITH BREAKFAST AND 2 TABLETS WITH SUPPER, Disp: , Rfl:     Ozempic, 1 MG/DOSE, 4 MG/3ML solution pen-injector, Inject 1 mg under the skin into the appropriate area as directed 1 (One) Time Per Week., Disp: , Rfl:     pantoprazole (PROTONIX) 40 MG EC tablet, Take 1 tablet by mouth once daily, Disp: 90 tablet, Rfl: 0    predniSONE (DELTASONE) 20 MG tablet, Take 2 tablets by oral route once daily, Disp: 10 tablet, Rfl: 0    Synthroid 75 MCG tablet, Take 1 tablet by mouth Daily., Disp: , Rfl:   No current facility-administered medications for this visit.    Facility-Administered Medications Ordered in Other Visits:     ferric carboxymaltose (INJECTAFER) IVPB 750 mg, 750 mg, Intravenous, Once, Anastacio Nelson MD    sodium chloride 0.9 % infusion, 20 mL/hr, Intravenous, Once, Anastacio Nelson MD     No Known Allergies    Family History   Problem Relation Age of Onset    Hypertension Mother     Diabetes type II Mother     Heart attack Father     Hypertension Father     Asthma Sister     Coronary artery disease Brother     Colon  "cancer Neg Hx         Social History     Social History Narrative    Not on file       Objective       Objective     Vital Signs:   /70 (BP Location: Left arm, Patient Position: Sitting, Cuff Size: Adult)   Pulse 94   Ht 182.9 cm (72.01\")   Wt 93.2 kg (205 lb 6.4 oz)   SpO2 98%   BMI 27.85 kg/m²     Body mass index is 27.85 kg/m².    Physical Exam  Constitutional:       Appearance: Normal appearance.   Pulmonary:      Effort: Pulmonary effort is normal.   Neurological:      General: No focal deficit present.      Mental Status: He is alert and oriented to person, place, and time.   Psychiatric:         Mood and Affect: Mood normal.         Behavior: Behavior normal.         Physical Exam                 Assessment & Plan          Assessment and Plan    Diagnoses and all orders for this visit:    1. Anemia due to stage 3b chronic kidney disease (Primary)    2. Iron deficiency          Assessment & Plan  1. Iron deficiency anemia.  Patient is scheduled to receive iron infusions today. The occult blood test results were within normal limits, indicating no gastrointestinal bleeding.  Patient has declined the option of an EGD and colonoscopy at this time. It was explained that if his anemia persists or worsens, these procedures may be necessary to identify potential sources of blood loss in the GI tract.  Patient has been advised to contact us should there be any changes in his condition.    PROCEDURE  Colonoscopy in 2021.          Follow Up       Follow Up   Return for Recommend EGD colonoscopy patient wishes to defer at this time.  If worsening anemia will call.  Patient was given instructions and counseling regarding his condition or for health maintenance advice. Please see specific information pulled into the AVS if appropriate.     "

## 2025-04-11 ENCOUNTER — OFFICE VISIT (OUTPATIENT)
Dept: GASTROENTEROLOGY | Facility: CLINIC | Age: 65
End: 2025-04-11
Payer: MEDICARE

## 2025-04-11 ENCOUNTER — TELEPHONE (OUTPATIENT)
Dept: FAMILY MEDICINE CLINIC | Age: 65
End: 2025-04-11
Payer: MEDICARE

## 2025-04-11 ENCOUNTER — HOSPITAL ENCOUNTER (OUTPATIENT)
Dept: INFUSION THERAPY | Facility: HOSPITAL | Age: 65
Discharge: HOME | End: 2025-04-11
Payer: MEDICARE

## 2025-04-11 VITALS
SYSTOLIC BLOOD PRESSURE: 133 MMHG | BODY MASS INDEX: 27.82 KG/M2 | OXYGEN SATURATION: 98 % | HEIGHT: 72 IN | HEART RATE: 94 BPM | WEIGHT: 205.4 LBS | DIASTOLIC BLOOD PRESSURE: 70 MMHG

## 2025-04-11 VITALS
SYSTOLIC BLOOD PRESSURE: 131 MMHG | TEMPERATURE: 97.2 F | RESPIRATION RATE: 20 BRPM | DIASTOLIC BLOOD PRESSURE: 64 MMHG | HEART RATE: 83 BPM | OXYGEN SATURATION: 97 %

## 2025-04-11 DIAGNOSIS — N18.32 ANEMIA DUE TO STAGE 3B CHRONIC KIDNEY DISEASE: Primary | ICD-10-CM

## 2025-04-11 DIAGNOSIS — E61.1 IRON DEFICIENCY: ICD-10-CM

## 2025-04-11 DIAGNOSIS — D63.1 ANEMIA DUE TO STAGE 3B CHRONIC KIDNEY DISEASE: Primary | ICD-10-CM

## 2025-04-11 DIAGNOSIS — N18.30 ANEMIA DUE TO STAGE 3 CHRONIC KIDNEY DISEASE, UNSPECIFIED WHETHER STAGE 3A OR 3B CKD: Primary | ICD-10-CM

## 2025-04-11 DIAGNOSIS — D63.1 ANEMIA DUE TO STAGE 3 CHRONIC KIDNEY DISEASE, UNSPECIFIED WHETHER STAGE 3A OR 3B CKD: Primary | ICD-10-CM

## 2025-04-11 PROCEDURE — 25010000002 FERRIC CARBOXYMALTOSE 750 MG/15ML SOLUTION: Performed by: STUDENT IN AN ORGANIZED HEALTH CARE EDUCATION/TRAINING PROGRAM

## 2025-04-11 PROCEDURE — 96374 THER/PROPH/DIAG INJ IV PUSH: CPT

## 2025-04-11 RX ORDER — SODIUM CHLORIDE 9 MG/ML
20 INJECTION, SOLUTION INTRAVENOUS ONCE
Status: DISCONTINUED | OUTPATIENT
Start: 2025-04-11 | End: 2025-04-13 | Stop reason: HOSPADM

## 2025-04-11 RX ORDER — SODIUM CHLORIDE 9 MG/ML
20 INJECTION, SOLUTION INTRAVENOUS ONCE
OUTPATIENT
Start: 2025-04-18

## 2025-04-11 RX ORDER — CLOPIDOGREL BISULFATE 75 MG/1
75 TABLET ORAL
COMMUNITY
Start: 2024-12-13 | End: 2025-12-13

## 2025-04-11 RX ADMIN — FERRIC CARBOXYMALTOSE INJECTION 750 MG: 50 INJECTION, SOLUTION INTRAVENOUS at 13:16

## 2025-04-11 NOTE — TELEPHONE ENCOUNTER
Pt states he is doing somewhat better with regard to pain, he has only been to one PT session. He would like to continue with PT and see how things progress. He states he still has some gabapentin. He will call when he gets low if he thinks he needs a refill.

## 2025-04-11 NOTE — TELEPHONE ENCOUNTER
----- Message from Janet TOVAR sent at 3/21/2025  2:39 PM EDT -----   TICKLE to call him in 3 weeks.  How is he doing with regard to the pain?  Is a getting better or worse?  Is physical therapy helpful?  Should we consider moving ahead with MRI?  Do we need to refill gabapentin for another month?

## 2025-04-18 ENCOUNTER — HOSPITAL ENCOUNTER (OUTPATIENT)
Dept: INFUSION THERAPY | Facility: HOSPITAL | Age: 65
Discharge: HOME | End: 2025-04-18
Payer: MEDICARE

## 2025-04-18 VITALS
HEART RATE: 70 BPM | OXYGEN SATURATION: 96 % | SYSTOLIC BLOOD PRESSURE: 97 MMHG | RESPIRATION RATE: 20 BRPM | TEMPERATURE: 98.5 F | DIASTOLIC BLOOD PRESSURE: 60 MMHG

## 2025-04-18 DIAGNOSIS — E61.1 IRON DEFICIENCY: ICD-10-CM

## 2025-04-18 DIAGNOSIS — D63.1 ANEMIA DUE TO STAGE 3 CHRONIC KIDNEY DISEASE, UNSPECIFIED WHETHER STAGE 3A OR 3B CKD: Primary | ICD-10-CM

## 2025-04-18 DIAGNOSIS — N18.30 ANEMIA DUE TO STAGE 3 CHRONIC KIDNEY DISEASE, UNSPECIFIED WHETHER STAGE 3A OR 3B CKD: Primary | ICD-10-CM

## 2025-04-18 PROCEDURE — 25010000002 FERRIC CARBOXYMALTOSE 750 MG/15ML SOLUTION: Performed by: STUDENT IN AN ORGANIZED HEALTH CARE EDUCATION/TRAINING PROGRAM

## 2025-04-18 PROCEDURE — 96365 THER/PROPH/DIAG IV INF INIT: CPT

## 2025-04-18 RX ORDER — SODIUM CHLORIDE 9 MG/ML
20 INJECTION, SOLUTION INTRAVENOUS ONCE
Status: DISCONTINUED | OUTPATIENT
Start: 2025-04-18 | End: 2025-04-20 | Stop reason: HOSPADM

## 2025-04-18 RX ORDER — SODIUM CHLORIDE 9 MG/ML
20 INJECTION, SOLUTION INTRAVENOUS ONCE
Status: CANCELLED | OUTPATIENT
Start: 2025-04-18

## 2025-04-18 RX ADMIN — FERRIC CARBOXYMALTOSE INJECTION 750 MG: 50 INJECTION, SOLUTION INTRAVENOUS at 13:23

## 2025-05-18 DIAGNOSIS — K21.9 GASTROESOPHAGEAL REFLUX DISEASE, UNSPECIFIED WHETHER ESOPHAGITIS PRESENT: ICD-10-CM

## 2025-05-19 RX ORDER — PANTOPRAZOLE SODIUM 40 MG/1
40 TABLET, DELAYED RELEASE ORAL DAILY
Qty: 90 TABLET | Refills: 0 | Status: SHIPPED | OUTPATIENT
Start: 2025-05-19

## 2025-05-20 ENCOUNTER — HOSPITAL ENCOUNTER (OUTPATIENT)
Dept: CARDIOLOGY | Facility: HOSPITAL | Age: 65
Discharge: HOME | End: 2025-05-20
Admitting: STUDENT IN AN ORGANIZED HEALTH CARE EDUCATION/TRAINING PROGRAM
Payer: MEDICARE

## 2025-05-20 DIAGNOSIS — N18.31 STAGE 3A CHRONIC KIDNEY DISEASE: ICD-10-CM

## 2025-05-20 LAB
BH CV ECHO MEAS - DIST REN A EDV LEFT: 12.8 CM/S
BH CV ECHO MEAS - DIST REN A PSV LEFT: 81.9 CM/S
BH CV ECHO MEAS - MID REN A EDV LEFT: 23.6 CM/S
BH CV ECHO MEAS - MID REN A PSV LEFT: 291.1 CM/S
BH CV ECHO MEAS - PROX REN A EDV LEFT: 25 CM/S
BH CV ECHO MEAS - PROX REN A PSV LEFT: 180.7 CM/S
BH CV VAS BP LEFT ARM: NORMAL MMHG
BH CV VAS BP RIGHT ARM: NORMAL MMHG
BH CV VAS KIDNEY HEIGHT LEFT: 5 CM
BH CV VAS RENAL AORTIC MID PSV: 77 CM/S
BH CV VAS RENAL HILUM RIGHT EDV: 7 CM/S
BH CV VAS RENAL HILUM RIGHT PSV: 42 CM/S
BH CV XLRA MEAS - KID L LEFT: 10.4 CM
BH CV XLRA MEAS DIST REN A EDV RIGHT: 15.9 CM/S
BH CV XLRA MEAS DIST REN A PSV RIGHT: 111.1 CM/S
BH CV XLRA MEAS KID H RIGHT: 4.8 CM
BH CV XLRA MEAS KID L RIGHT: 10.4 CM
BH CV XLRA MEAS KID W RIGHT: 4 CM
BH CV XLRA MEAS MID REN A EDV RIGHT: 23.4 CM/S
BH CV XLRA MEAS MID REN A PSV RIGHT: 217 CM/S
BH CV XLRA MEAS PROX REN A EDV RIGHT: 22.5 CM/S
BH CV XLRA MEAS PROX REN A PSV RIGHT: 159.2 CM/S
BH CV XLRA MEAS RAR LEFT: 2.6
BH CV XLRA MEAS RAR RIGHT: 2.8
BH CV XLRA MEAS RENAL A ORG EDV LEFT: 29.6 CM/S
BH CV XLRA MEAS RENAL A ORG EDV RIGHT: 14.5 CM/S
BH CV XLRA MEAS RENAL A ORG PSV LEFT: 199.8 CM/S
BH CV XLRA MEAS RENAL A ORG PSV RIGHT: 109.3 CM/S
LEFT KIDNEY WIDTH: 4.5 CM
RIGHT RENAL UPPER PARENCHYMA MAX: 26.4 CM/S
RIGHT RENAL UPPER PARENCHYMA MIN: 5.2 CM/S
RIGHT RENAL UPPER PARENCHYMA RI: 0.8

## 2025-05-20 PROCEDURE — 93975 VASCULAR STUDY: CPT

## 2025-06-17 ENCOUNTER — TRANSCRIBE ORDERS (OUTPATIENT)
Dept: ADMINISTRATIVE | Facility: HOSPITAL | Age: 65
End: 2025-06-17
Payer: MEDICARE

## 2025-06-17 ENCOUNTER — LAB (OUTPATIENT)
Dept: LAB | Facility: HOSPITAL | Age: 65
End: 2025-06-17
Payer: MEDICARE

## 2025-06-17 DIAGNOSIS — I10 HYPERTENSION, ESSENTIAL: ICD-10-CM

## 2025-06-17 DIAGNOSIS — E11.9 DIABETES MELLITUS WITHOUT COMPLICATION: Primary | ICD-10-CM

## 2025-06-17 DIAGNOSIS — E11.9 DIABETES MELLITUS WITHOUT COMPLICATION: ICD-10-CM

## 2025-06-17 DIAGNOSIS — N18.30 STAGE 3 CHRONIC KIDNEY DISEASE, UNSPECIFIED WHETHER STAGE 3A OR 3B CKD: ICD-10-CM

## 2025-06-17 DIAGNOSIS — N18.30 STAGE 3 CHRONIC KIDNEY DISEASE, UNSPECIFIED WHETHER STAGE 3A OR 3B CKD: Primary | ICD-10-CM

## 2025-06-17 LAB
ALBUMIN SERPL-MCNC: 4.4 G/DL (ref 3.5–5.2)
ALBUMIN UR-MCNC: <1.2 MG/DL
ALBUMIN/GLOB SERPL: 1.1 G/DL
ALP SERPL-CCNC: 117 U/L (ref 39–117)
ALT SERPL W P-5'-P-CCNC: 23 U/L (ref 1–41)
ANION GAP SERPL CALCULATED.3IONS-SCNC: 10.3 MMOL/L (ref 5–15)
AST SERPL-CCNC: 24 U/L (ref 1–40)
BASOPHILS # BLD AUTO: 0.02 10*3/MM3 (ref 0–0.2)
BASOPHILS NFR BLD AUTO: 0.2 % (ref 0–1.5)
BILIRUB SERPL-MCNC: 0.4 MG/DL (ref 0–1.2)
BILIRUB UR QL STRIP: NEGATIVE
BUN SERPL-MCNC: 20 MG/DL (ref 8–23)
BUN/CREAT SERPL: 11.9 (ref 7–25)
CALCIUM SPEC-SCNC: 10.1 MG/DL (ref 8.6–10.5)
CHLORIDE SERPL-SCNC: 101 MMOL/L (ref 98–107)
CHOLEST SERPL-MCNC: 143 MG/DL (ref 0–200)
CLARITY UR: CLEAR
CO2 SERPL-SCNC: 26.7 MMOL/L (ref 22–29)
COLOR UR: YELLOW
CREAT SERPL-MCNC: 1.68 MG/DL (ref 0.76–1.27)
CREAT UR-MCNC: 95.3 MG/DL
CREAT UR-MCNC: 98.9 MG/DL
DEPRECATED RDW RBC AUTO: 71.9 FL (ref 37–54)
EGFRCR SERPLBLD CKD-EPI 2021: 44.8 ML/MIN/1.73
EOSINOPHIL # BLD AUTO: 0.15 10*3/MM3 (ref 0–0.4)
EOSINOPHIL NFR BLD AUTO: 1.7 % (ref 0.3–6.2)
ERYTHROCYTE [DISTWIDTH] IN BLOOD BY AUTOMATED COUNT: 22.2 % (ref 12.3–15.4)
FOLATE SERPL-MCNC: 9.28 NG/ML (ref 4.78–24.2)
GLOBULIN UR ELPH-MCNC: 4 GM/DL
GLUCOSE SERPL-MCNC: 225 MG/DL (ref 65–99)
GLUCOSE UR STRIP-MCNC: ABNORMAL MG/DL
HBA1C MFR BLD: 6.8 % (ref 4.8–5.6)
HCT VFR BLD AUTO: 50.2 % (ref 37.5–51)
HDLC SERPL-MCNC: 27 MG/DL (ref 40–60)
HGB BLD-MCNC: 15.5 G/DL (ref 13–17.7)
HGB UR QL STRIP.AUTO: NEGATIVE
IMM GRANULOCYTES # BLD AUTO: 0.05 10*3/MM3 (ref 0–0.05)
IMM GRANULOCYTES NFR BLD AUTO: 0.6 % (ref 0–0.5)
KETONES UR QL STRIP: NEGATIVE
LDLC SERPL CALC-MCNC: 82 MG/DL (ref 0–100)
LDLC/HDLC SERPL: 2.81 {RATIO}
LEUKOCYTE ESTERASE UR QL STRIP.AUTO: NEGATIVE
LYMPHOCYTES # BLD AUTO: 1.41 10*3/MM3 (ref 0.7–3.1)
LYMPHOCYTES NFR BLD AUTO: 15.7 % (ref 19.6–45.3)
MCH RBC QN AUTO: 27.2 PG (ref 26.6–33)
MCHC RBC AUTO-ENTMCNC: 30.9 G/DL (ref 31.5–35.7)
MCV RBC AUTO: 88.2 FL (ref 79–97)
MICROALBUMIN/CREAT UR: NORMAL MG/G{CREAT}
MONOCYTES # BLD AUTO: 0.55 10*3/MM3 (ref 0.1–0.9)
MONOCYTES NFR BLD AUTO: 6.1 % (ref 5–12)
NEUTROPHILS NFR BLD AUTO: 6.79 10*3/MM3 (ref 1.7–7)
NEUTROPHILS NFR BLD AUTO: 75.7 % (ref 42.7–76)
NITRITE UR QL STRIP: NEGATIVE
PH UR STRIP.AUTO: 5.5 [PH] (ref 5–8)
PLATELET # BLD AUTO: 246 10*3/MM3 (ref 140–450)
PMV BLD AUTO: 9.2 FL (ref 6–12)
POTASSIUM SERPL-SCNC: 5.5 MMOL/L (ref 3.5–5.2)
PROT ?TM UR-MCNC: 8.3 MG/DL
PROT SERPL-MCNC: 8.4 G/DL (ref 6–8.5)
PROT UR QL STRIP: NEGATIVE
PROT/CREAT UR: 0.08 MG/G{CREAT}
RBC # BLD AUTO: 5.69 10*6/MM3 (ref 4.14–5.8)
SODIUM SERPL-SCNC: 138 MMOL/L (ref 136–145)
SP GR UR STRIP: 1.02 (ref 1–1.03)
T4 FREE SERPL-MCNC: 1.41 NG/DL (ref 0.92–1.68)
TRIGL SERPL-MCNC: 201 MG/DL (ref 0–150)
TSH SERPL DL<=0.05 MIU/L-ACNC: 4.64 UIU/ML (ref 0.27–4.2)
UROBILINOGEN UR QL STRIP: ABNORMAL
VIT B12 BLD-MCNC: 468 PG/ML (ref 211–946)
VLDLC SERPL-MCNC: 34 MG/DL (ref 5–40)
WBC NRBC COR # BLD AUTO: 8.97 10*3/MM3 (ref 3.4–10.8)

## 2025-06-17 PROCEDURE — 80061 LIPID PANEL: CPT

## 2025-06-17 PROCEDURE — 82746 ASSAY OF FOLIC ACID SERUM: CPT

## 2025-06-17 PROCEDURE — 85025 COMPLETE CBC W/AUTO DIFF WBC: CPT

## 2025-06-17 PROCEDURE — 82570 ASSAY OF URINE CREATININE: CPT

## 2025-06-17 PROCEDURE — 84443 ASSAY THYROID STIM HORMONE: CPT

## 2025-06-17 PROCEDURE — 80053 COMPREHEN METABOLIC PANEL: CPT

## 2025-06-17 PROCEDURE — 83036 HEMOGLOBIN GLYCOSYLATED A1C: CPT

## 2025-06-17 PROCEDURE — 36415 COLL VENOUS BLD VENIPUNCTURE: CPT

## 2025-06-17 PROCEDURE — 81003 URINALYSIS AUTO W/O SCOPE: CPT

## 2025-06-17 PROCEDURE — 84156 ASSAY OF PROTEIN URINE: CPT

## 2025-06-17 PROCEDURE — 82043 UR ALBUMIN QUANTITATIVE: CPT

## 2025-06-17 PROCEDURE — 82607 VITAMIN B-12: CPT

## 2025-06-17 PROCEDURE — 84439 ASSAY OF FREE THYROXINE: CPT

## 2025-06-26 ENCOUNTER — TRANSCRIBE ORDERS (OUTPATIENT)
Dept: ADMINISTRATIVE | Facility: HOSPITAL | Age: 65
End: 2025-06-26
Payer: MEDICARE

## 2025-06-26 DIAGNOSIS — E87.5 HYPERKALURIA: Primary | ICD-10-CM

## 2025-06-27 ENCOUNTER — TRANSCRIBE ORDERS (OUTPATIENT)
Dept: ADMINISTRATIVE | Facility: HOSPITAL | Age: 65
End: 2025-06-27
Payer: MEDICARE

## 2025-06-27 DIAGNOSIS — I70.1 RENAL ARTERY STENOSIS: Primary | ICD-10-CM

## 2025-06-30 ENCOUNTER — LAB (OUTPATIENT)
Dept: LAB | Facility: HOSPITAL | Age: 65
End: 2025-06-30
Payer: MEDICARE

## 2025-06-30 ENCOUNTER — TRANSCRIBE ORDERS (OUTPATIENT)
Dept: ADMINISTRATIVE | Facility: HOSPITAL | Age: 65
End: 2025-06-30
Payer: MEDICARE

## 2025-06-30 DIAGNOSIS — N17.9 ACUTE RENAL FAILURE, UNSPECIFIED ACUTE RENAL FAILURE TYPE: Primary | ICD-10-CM

## 2025-06-30 DIAGNOSIS — N18.30 STAGE 3 CHRONIC KIDNEY DISEASE, UNSPECIFIED WHETHER STAGE 3A OR 3B CKD: ICD-10-CM

## 2025-06-30 DIAGNOSIS — N17.9 ACUTE RENAL FAILURE, UNSPECIFIED ACUTE RENAL FAILURE TYPE: ICD-10-CM

## 2025-06-30 DIAGNOSIS — E87.5 HYPERKALURIA: ICD-10-CM

## 2025-06-30 LAB
ALBUMIN SERPL-MCNC: 3.8 G/DL (ref 3.5–5.2)
ALBUMIN/GLOB SERPL: 1.2 G/DL
ALP SERPL-CCNC: 135 U/L (ref 39–117)
ALT SERPL W P-5'-P-CCNC: 12 U/L (ref 1–41)
ANION GAP SERPL CALCULATED.3IONS-SCNC: 8.3 MMOL/L (ref 5–15)
AST SERPL-CCNC: 17 U/L (ref 1–40)
BILIRUB SERPL-MCNC: 0.2 MG/DL (ref 0–1.2)
BUN SERPL-MCNC: 30 MG/DL (ref 8–23)
BUN/CREAT SERPL: 16.3 (ref 7–25)
CALCIUM SPEC-SCNC: 9 MG/DL (ref 8.6–10.5)
CHLORIDE SERPL-SCNC: 102 MMOL/L (ref 98–107)
CO2 SERPL-SCNC: 26.7 MMOL/L (ref 22–29)
CREAT SERPL-MCNC: 1.84 MG/DL (ref 0.76–1.27)
EGFRCR SERPLBLD CKD-EPI 2021: 39.9 ML/MIN/1.73
GLOBULIN UR ELPH-MCNC: 3.3 GM/DL
GLUCOSE SERPL-MCNC: 245 MG/DL (ref 65–99)
POTASSIUM SERPL-SCNC: 4.5 MMOL/L (ref 3.5–5.2)
PROT SERPL-MCNC: 7.1 G/DL (ref 6–8.5)
SODIUM SERPL-SCNC: 137 MMOL/L (ref 136–145)

## 2025-06-30 PROCEDURE — 36415 COLL VENOUS BLD VENIPUNCTURE: CPT

## 2025-06-30 PROCEDURE — 80053 COMPREHEN METABOLIC PANEL: CPT

## 2025-07-07 ENCOUNTER — HOSPITAL ENCOUNTER (OUTPATIENT)
Dept: CT IMAGING | Facility: HOSPITAL | Age: 65
Discharge: HOME | End: 2025-07-07
Admitting: NURSE PRACTITIONER
Payer: MEDICARE

## 2025-07-07 DIAGNOSIS — I70.1 RENAL ARTERY STENOSIS: ICD-10-CM

## 2025-07-07 PROCEDURE — 74175 CTA ABDOMEN W/CONTRAST: CPT

## 2025-07-07 PROCEDURE — 25510000001 IOPAMIDOL PER 1 ML: Performed by: NURSE PRACTITIONER

## 2025-07-07 RX ORDER — IOPAMIDOL 755 MG/ML
100 INJECTION, SOLUTION INTRAVASCULAR
Status: COMPLETED | OUTPATIENT
Start: 2025-07-07 | End: 2025-07-07

## 2025-07-07 RX ADMIN — IOPAMIDOL 100 ML: 755 INJECTION, SOLUTION INTRAVENOUS at 14:45

## 2025-08-05 ENCOUNTER — OFFICE VISIT (OUTPATIENT)
Age: 65
End: 2025-08-05
Payer: MEDICARE

## 2025-08-05 VITALS
SYSTOLIC BLOOD PRESSURE: 177 MMHG | DIASTOLIC BLOOD PRESSURE: 99 MMHG | OXYGEN SATURATION: 93 % | HEART RATE: 84 BPM | RESPIRATION RATE: 18 BRPM

## 2025-08-05 DIAGNOSIS — I70.213 ATHEROSCLEROSIS OF NATIVE ARTERIES OF EXTREMITIES WITH INTERMITTENT CLAUDICATION, BILATERAL LEGS: ICD-10-CM

## 2025-08-05 DIAGNOSIS — I74.09 AORTOILIAC OCCLUSIVE DISEASE: Primary | ICD-10-CM

## 2025-08-05 DIAGNOSIS — K55.1 MESENTERIC ARTERY STENOSIS: ICD-10-CM

## 2025-08-06 ENCOUNTER — LAB (OUTPATIENT)
Dept: LAB | Facility: HOSPITAL | Age: 65
End: 2025-08-06
Payer: MEDICARE

## 2025-08-06 ENCOUNTER — TRANSCRIBE ORDERS (OUTPATIENT)
Dept: ADMINISTRATIVE | Facility: HOSPITAL | Age: 65
End: 2025-08-06
Payer: MEDICARE

## 2025-08-06 DIAGNOSIS — I10 BENIGN HYPERTENSION: ICD-10-CM

## 2025-08-06 DIAGNOSIS — N18.30 STAGE 3 CHRONIC KIDNEY DISEASE, UNSPECIFIED WHETHER STAGE 3A OR 3B CKD: ICD-10-CM

## 2025-08-06 DIAGNOSIS — N18.30 STAGE 3 CHRONIC KIDNEY DISEASE, UNSPECIFIED WHETHER STAGE 3A OR 3B CKD: Primary | ICD-10-CM

## 2025-08-06 LAB
ALBUMIN SERPL-MCNC: 3.8 G/DL (ref 3.5–5.2)
ALBUMIN UR-MCNC: <1.2 MG/DL
ALBUMIN/GLOB SERPL: 1 G/DL
ALP SERPL-CCNC: 118 U/L (ref 39–117)
ALT SERPL W P-5'-P-CCNC: 19 U/L (ref 1–41)
ANION GAP SERPL CALCULATED.3IONS-SCNC: 8.6 MMOL/L (ref 5–15)
AST SERPL-CCNC: 22 U/L (ref 1–40)
BASOPHILS # BLD AUTO: 0.02 10*3/MM3 (ref 0–0.2)
BASOPHILS NFR BLD AUTO: 0.3 % (ref 0–1.5)
BILIRUB SERPL-MCNC: 0.2 MG/DL (ref 0–1.2)
BILIRUB UR QL STRIP: NEGATIVE
BUN SERPL-MCNC: 14 MG/DL (ref 8–23)
BUN/CREAT SERPL: 8.9 (ref 7–25)
CALCIUM SPEC-SCNC: 9.6 MG/DL (ref 8.6–10.5)
CHLORIDE SERPL-SCNC: 104 MMOL/L (ref 98–107)
CLARITY UR: CLEAR
CO2 SERPL-SCNC: 26.4 MMOL/L (ref 22–29)
COLOR UR: YELLOW
CREAT SERPL-MCNC: 1.58 MG/DL (ref 0.76–1.27)
CREAT UR-MCNC: 78.1 MG/DL
CREAT UR-MCNC: 91.3 MG/DL
DEPRECATED RDW RBC AUTO: 45.8 FL (ref 37–54)
EGFRCR SERPLBLD CKD-EPI 2021: 47.9 ML/MIN/1.73
EOSINOPHIL # BLD AUTO: 0.18 10*3/MM3 (ref 0–0.4)
EOSINOPHIL NFR BLD AUTO: 2.6 % (ref 0.3–6.2)
ERYTHROCYTE [DISTWIDTH] IN BLOOD BY AUTOMATED COUNT: 14.5 % (ref 12.3–15.4)
GLOBULIN UR ELPH-MCNC: 4 GM/DL
GLUCOSE SERPL-MCNC: 164 MG/DL (ref 65–99)
GLUCOSE UR STRIP-MCNC: ABNORMAL MG/DL
HCT VFR BLD AUTO: 46.3 % (ref 37.5–51)
HGB BLD-MCNC: 14.9 G/DL (ref 13–17.7)
HGB UR QL STRIP.AUTO: NEGATIVE
IMM GRANULOCYTES # BLD AUTO: 0.04 10*3/MM3 (ref 0–0.05)
IMM GRANULOCYTES NFR BLD AUTO: 0.6 % (ref 0–0.5)
KETONES UR QL STRIP: NEGATIVE
LEUKOCYTE ESTERASE UR QL STRIP.AUTO: NEGATIVE
LYMPHOCYTES # BLD AUTO: 1.46 10*3/MM3 (ref 0.7–3.1)
LYMPHOCYTES NFR BLD AUTO: 21.4 % (ref 19.6–45.3)
MCH RBC QN AUTO: 28.2 PG (ref 26.6–33)
MCHC RBC AUTO-ENTMCNC: 32.2 G/DL (ref 31.5–35.7)
MCV RBC AUTO: 87.7 FL (ref 79–97)
MICROALBUMIN/CREAT UR: NORMAL MG/G{CREAT}
MONOCYTES # BLD AUTO: 0.58 10*3/MM3 (ref 0.1–0.9)
MONOCYTES NFR BLD AUTO: 8.5 % (ref 5–12)
NEUTROPHILS NFR BLD AUTO: 4.55 10*3/MM3 (ref 1.7–7)
NEUTROPHILS NFR BLD AUTO: 66.6 % (ref 42.7–76)
NITRITE UR QL STRIP: NEGATIVE
PH UR STRIP.AUTO: 5.5 [PH] (ref 5–8)
PLATELET # BLD AUTO: 235 10*3/MM3 (ref 140–450)
PMV BLD AUTO: 9.9 FL (ref 6–12)
POTASSIUM SERPL-SCNC: 4.9 MMOL/L (ref 3.5–5.2)
PROT ?TM UR-MCNC: 10.5 MG/DL
PROT SERPL-MCNC: 7.8 G/DL (ref 6–8.5)
PROT UR QL STRIP: NEGATIVE
PROT/CREAT UR: 0.12 MG/G{CREAT}
RBC # BLD AUTO: 5.28 10*6/MM3 (ref 4.14–5.8)
SODIUM SERPL-SCNC: 139 MMOL/L (ref 136–145)
SP GR UR STRIP: 1.02 (ref 1–1.03)
UROBILINOGEN UR QL STRIP: ABNORMAL
WBC NRBC COR # BLD AUTO: 6.83 10*3/MM3 (ref 3.4–10.8)

## 2025-08-06 PROCEDURE — 82043 UR ALBUMIN QUANTITATIVE: CPT

## 2025-08-06 PROCEDURE — 84156 ASSAY OF PROTEIN URINE: CPT

## 2025-08-06 PROCEDURE — 85025 COMPLETE CBC W/AUTO DIFF WBC: CPT

## 2025-08-06 PROCEDURE — 80053 COMPREHEN METABOLIC PANEL: CPT

## 2025-08-06 PROCEDURE — 36415 COLL VENOUS BLD VENIPUNCTURE: CPT

## 2025-08-06 PROCEDURE — 81003 URINALYSIS AUTO W/O SCOPE: CPT

## 2025-08-06 PROCEDURE — 82570 ASSAY OF URINE CREATININE: CPT

## 2025-08-13 DIAGNOSIS — K21.9 GASTROESOPHAGEAL REFLUX DISEASE, UNSPECIFIED WHETHER ESOPHAGITIS PRESENT: ICD-10-CM

## 2025-08-13 RX ORDER — PANTOPRAZOLE SODIUM 40 MG/1
40 TABLET, DELAYED RELEASE ORAL DAILY
Qty: 90 TABLET | Refills: 0 | Status: SHIPPED | OUTPATIENT
Start: 2025-08-13